# Patient Record
Sex: FEMALE | Race: WHITE | NOT HISPANIC OR LATINO | Employment: OTHER | URBAN - METROPOLITAN AREA
[De-identification: names, ages, dates, MRNs, and addresses within clinical notes are randomized per-mention and may not be internally consistent; named-entity substitution may affect disease eponyms.]

---

## 2018-08-27 NOTE — PROGRESS NOTES
[facility] at [facility]  INITIAL OFFICE CONSULT      Carli Carver  1935  PCP: Maria Antonia Rivera MD    SUBJECTIVE:   Carli Carver is a 83 y.o. female seen for a consultation visit regarding the following: HTN, VHD, Bradycardia, [employer] Pacemaker    Chief Complaint:   Chief Complaint   Patient presents with   • Pacemaker Problem          Consultation is requested by Kleber Ellison MD for evaluation of Pacemaker Problem    Problem List:   1. Severe Bradycardia/HB   A)Holter 2014 Bradycardia/CHB   B)[employer] Pacemaker 2014     2. Chest Pain   A)Echo EF normal,  mild AI 2010   b)Negative MPS 2014/Moedrate calcium score 2014-Medical therapy     2. HTN- controlled  3. HLD-Statin   4. Hypothyroidism- Chronic synthroid     History:  Today Mrs. Carver was seen for initial consultation regarding history of severe bradycardia, hypertension, hyperlipidemia, and previous [employer]ick pacemaker.  She was following with Dr. Ellison and Scot is moving to [facility] therefore she wanted to reestablish care with our office.  She reports in 2014 she was having episodes of dizziness Holter monitor confirmed severe complete heart block and bradycardia.  A dual-chamber device was placed by Dr. Ellison and followed since then.  In addition above she's had history of chronic chest pain shortness of breath.  Echocardiogram in 2010 revealed mild a abnormal LV function.  She has stress test 2014 revealing no ischemia with normal EF.  She also had A CT calcium score read as Moderate.   She has been treated with chronic medical therapy including aspirin and statin.  She denies any worsening symptoms of chest pain suggesting angina.  She denies any heart failer symptoms such as orthopnea, peripheral edema, or PND.  She states she is very active around her house does her own housework and takes walks without any symptoms.  She denies palpitations, dizziness, or near-syncope  events.      Cardiac PMH: (Old records have been reviewed and summarized below)        Past Medical History, Past Surgical History, Family history, Social History, and Medications were all reviewed with the patient today and updated as necessary.     Current Outpatient Prescriptions   Medication Sig Dispense Refill   • aspirin 81 MG EC tablet Take 81 mg by mouth Every Other Day.     • atorvastatin (LIPITOR) 20 MG tablet Take 20 mg by mouth Daily.     • BIOTIN PO Take  by mouth Daily.     • calcium carbonate (OS-YUNIER) 600 MG tablet Take 600 mg by mouth 3 (Three) Times a Day With Meals.     • calcium carbonate (TUMS) 500 MG chewable tablet Chew 1 tablet As Needed for Indigestion or Heartburn.     • levothyroxine (SYNTHROID, LEVOTHROID) 100 MCG tablet Take 100 mcg by mouth Daily.     • Magnesium 250 MG tablet Take  by mouth Daily.     • Melatonin 10 MG capsule Take 10 mg by mouth Every Night. Takes 1-2 nightly     • Multiple Vitamins-Minerals (MULTIVITAMIN ADULTS PO) Take  by mouth.     • Omega-3 Fatty Acids (FISH OIL) 1200 MG capsule capsule Take 1,200 mg by mouth Daily.     • vitamin B-12 (CYANOCOBALAMIN) 1000 MCG tablet Take 1,000 mcg by mouth Daily.       No current facility-administered medications for this visit.      No Known Allergies      Past Medical History:   Diagnosis Date   • Bradycardia    • Heartburn    • Hyperlipidemia    • Hypothyroid      Past Surgical History:   Procedure Laterality Date   • CATARACT EXTRACTION Right    • INSERT / REPLACE / REMOVE PACEMAKER     • TUBAL ABDOMINAL LIGATION       Family History   Problem Relation Age of Onset   • Lung cancer Mother    • Heart disease Brother      Social History   Substance Use Topics   • Smoking status: Never Smoker   • Smokeless tobacco: Not on file   • Alcohol use No       ROS:  Review of Symptoms:  General: no recent weight loss/gain, weakness or fatigue  Skin: no rashes, lumps, or other skin changes  HEENT: no dizziness, lightheadedness, or vision  "changes  Respiratory: no cough or hemoptysis  Cardiovascular: no palpitations, and tachycardia  Gastrointestinal: no black/tarry stools or diarrhea  Urinary: no change in frequency or urgency  Peripheral Vascular: no claudication or leg cramps  Musculoskeletal: no muscle or joint pain/stiffness  Psychiatric: no depression or excessive stress  Neurological: no sensory or motor loss, no syncope  Hematologic: no anemia, easy bruising or bleeding  Endocrine: no thyroid problems, nor heat or cold intolerance         PHYSICAL EXAM:   /74 (BP Location: Left arm, Patient Position: Sitting)   Pulse 70   Ht 160 cm (63\")   Wt 60.7 kg (133 lb 12.8 oz)   BMI 23.70 kg/m²      Wt Readings from Last 5 Encounters:   08/28/18 60.7 kg (133 lb 12.8 oz)     BP Readings from Last 5 Encounters:   08/28/18 138/74       General-Well Nourished, Well developed  Eyes - PERRLA  Neck- supple, No mass  CV- regular rate and rhythm, no MRG  Lung- clear bilaterally  Abd- soft, +BS  Musc/skel - Norm strength and range of motion  Skin- warm and dry  Neuro - Alert & Oriented x 3, appropriate mood.    Medical problems and test results were reviewed with the patient today.     Results for orders placed or performed during the hospital encounter of 08/06/14   Hemoglobin A1c   Result Value Ref Range    Hemoglobin A1C 6.0 4.00 - 6.00 %    Mean Bld Glu Estim. 120 mg/dL   Lipid panel   Result Value Ref Range    Total Cholesterol 142 0 - 200 mg/dL    Triglycerides 182 (H) 0 - 150 mg/dL    HDL Cholesterol 52 40 - 60 mg/dL    LDL Cholesterol  79 0 - 130 mg/dL         Lab Results   Component Value Date    HDL 52 08/06/2014    LDL 79 08/06/2014     Device interrogation:   Dual-chamber [employer] pacemaker.  A paced 95%.  RV paced less than 1%.  P wave 2.6 mV.  R-wave greater than 12 mV.  Atrial threshold 0.62 V is 0.5 ms.  RV threshold 1.0 V at 0.5 ms.  Atrial impedance 390 ohms.  RV impedance 630 ohms.  Battery voltage 2.98 V.  9 years remaining on " battery.  Less than 1% mode switches.      EKG:  (EKG/Tracing has been independently visualized by me and summarized below)      ECG 12 Lead  Date/Time: 8/28/2018 2:40 PM  Performed by: HUNTER FUNG  Authorized by: HUNTER FUNG   Rhythm: paced  Rate: normal  Conduction: conduction normal  ST Segments: ST segments normal  T Waves: T waves normal  QRS axis: normal  Clinical impression: normal ECG        ASSESSMENT:   1. VHD, Mild AI, Normal EF by Echo 2010.  Consider repeat Echo if change in symptoms.   2. HTN:  Controlled, Sodium restriction, diet and  exercise.   3. HLD: Continue statin   3. Pacemaker:  Dual chamber, Normal function.     PLAN:    · Continue current medical therapy  · Return for follow up in 6 months or sooner as needed.       Cardiology/Electrophysiology  08/28/18  10:57 AM  Will Sherrie MENDOZA

## 2018-08-28 ENCOUNTER — OFFICE VISIT (OUTPATIENT)
Dept: CARDIOLOGY | Facility: CLINIC | Age: 83
End: 2018-08-28

## 2018-08-28 VITALS
HEIGHT: 63 IN | BODY MASS INDEX: 23.71 KG/M2 | WEIGHT: 133.8 LBS | HEART RATE: 70 BPM | DIASTOLIC BLOOD PRESSURE: 74 MMHG | SYSTOLIC BLOOD PRESSURE: 138 MMHG

## 2018-08-28 DIAGNOSIS — R00.1 BRADYCARDIA: Primary | ICD-10-CM

## 2018-08-28 PROCEDURE — 99203 OFFICE O/P NEW LOW 30 MIN: CPT | Performed by: PHYSICIAN ASSISTANT

## 2018-08-28 PROCEDURE — 93283 PRGRMG EVAL IMPLANTABLE DFB: CPT | Performed by: PHYSICIAN ASSISTANT

## 2018-08-28 RX ORDER — AMOXICILLIN 500 MG
1200 CAPSULE ORAL AS NEEDED
COMMUNITY
End: 2020-04-09

## 2018-08-28 RX ORDER — ATORVASTATIN CALCIUM 20 MG/1
20 TABLET, FILM COATED ORAL NIGHTLY
COMMUNITY
End: 2023-01-13 | Stop reason: SDUPTHER

## 2018-08-28 RX ORDER — PHENOL 1.4 %
600 AEROSOL, SPRAY (ML) MUCOUS MEMBRANE DAILY
Status: ON HOLD | COMMUNITY
End: 2020-07-15

## 2018-08-28 RX ORDER — ASPIRIN 81 MG/1
81 TABLET ORAL DAILY
Status: ON HOLD | COMMUNITY
End: 2020-04-23

## 2018-08-28 RX ORDER — LANOLIN ALCOHOL/MO/W.PET/CERES
1000 CREAM (GRAM) TOPICAL DAILY
COMMUNITY
End: 2019-09-24

## 2018-08-28 RX ORDER — LEVOTHYROXINE SODIUM 0.1 MG/1
100 TABLET ORAL DAILY
COMMUNITY
End: 2019-03-18

## 2018-08-28 RX ORDER — MULTIVITAMIN WITH IRON
500 TABLET ORAL EVERY OTHER DAY
COMMUNITY

## 2018-08-28 RX ORDER — MELATONIN 10 MG
10 CAPSULE ORAL NIGHTLY PRN
COMMUNITY
End: 2022-05-27

## 2018-08-28 RX ORDER — CALCIUM CARBONATE 200(500)MG
1 TABLET,CHEWABLE ORAL AS NEEDED
COMMUNITY
End: 2022-05-27

## 2018-09-17 ENCOUNTER — TELEPHONE (OUTPATIENT)
Dept: CARDIOLOGY | Facility: CLINIC | Age: 83
End: 2018-09-17

## 2018-09-17 NOTE — TELEPHONE ENCOUNTER
Called pt due to Merlin home monitor is not reading.  Left my name and number for pt to return my call.    Pt returned my call and they disconnected their home phone.  Sending cellular adapter.

## 2018-09-27 ENCOUNTER — TELEPHONE (OUTPATIENT)
Dept: CARDIOLOGY | Facility: CLINIC | Age: 83
End: 2018-09-27

## 2018-09-27 ENCOUNTER — CLINICAL SUPPORT NO REQUIREMENTS (OUTPATIENT)
Dept: CARDIOLOGY | Facility: CLINIC | Age: 83
End: 2018-09-27

## 2018-09-27 DIAGNOSIS — R00.1 BRADYCARDIA: ICD-10-CM

## 2018-09-27 PROCEDURE — 93296 REM INTERROG EVL PM/IDS: CPT | Performed by: INTERNAL MEDICINE

## 2018-09-27 PROCEDURE — 93294 REM INTERROG EVL PM/LDLS PM: CPT | Performed by: INTERNAL MEDICINE

## 2018-09-27 NOTE — TELEPHONE ENCOUNTER
Pt received her adaptor but did not send transmission. She will call me when she gets home and I will walk her through the steps.

## 2019-01-22 ENCOUNTER — CLINICAL SUPPORT NO REQUIREMENTS (OUTPATIENT)
Dept: CARDIOLOGY | Facility: CLINIC | Age: 84
End: 2019-01-22

## 2019-01-22 DIAGNOSIS — R00.1 BRADYCARDIA: Primary | ICD-10-CM

## 2019-01-22 PROCEDURE — 93294 REM INTERROG EVL PM/LDLS PM: CPT | Performed by: INTERNAL MEDICINE

## 2019-01-22 PROCEDURE — 93296 REM INTERROG EVL PM/IDS: CPT | Performed by: INTERNAL MEDICINE

## 2019-03-05 ENCOUNTER — OFFICE VISIT (OUTPATIENT)
Dept: CARDIOLOGY | Facility: CLINIC | Age: 84
End: 2019-03-05

## 2019-03-05 VITALS
DIASTOLIC BLOOD PRESSURE: 70 MMHG | HEIGHT: 62 IN | OXYGEN SATURATION: 99 % | WEIGHT: 142.4 LBS | SYSTOLIC BLOOD PRESSURE: 128 MMHG | BODY MASS INDEX: 26.2 KG/M2 | HEART RATE: 79 BPM

## 2019-03-05 DIAGNOSIS — R00.1 BRADYCARDIA: ICD-10-CM

## 2019-03-05 DIAGNOSIS — R42 DIZZINESS: Primary | ICD-10-CM

## 2019-03-05 PROCEDURE — 93280 PM DEVICE PROGR EVAL DUAL: CPT | Performed by: INTERNAL MEDICINE

## 2019-03-05 PROCEDURE — 99214 OFFICE O/P EST MOD 30 MIN: CPT | Performed by: INTERNAL MEDICINE

## 2019-03-05 RX ORDER — MECLIZINE HYDROCHLORIDE 25 MG/1
25 TABLET ORAL 3 TIMES DAILY PRN
Status: DISCONTINUED | OUTPATIENT
Start: 2019-03-05 | End: 2019-03-18

## 2019-03-05 NOTE — PROGRESS NOTES
Carli Carver  1935  PCP: Maria Antonia Rivera MD    SUBJECTIVE:   Carli Carver is a 84 y.o. female seen for a follow up visit regarding the following:     Chief Complaint: Follow up for bradycardia    HPI:    Since last visit the patient's status has been stable, occasional weakness. Vertigo issues     History:       Cardiac PMH: (Old records have been reviewed and summarized below)  1. Severe Bradycardia              A)Holter 2014 Bradycardia              B)[employer] Pacemaker 2014                2. Chest Pain              A)Echo EF normal,  mild AI 2010              b)Negative MPS 2014/Moedrate calcium score 2014-Medical therapy      2. HTN- controlled  3. HLD-Statin   4. Hypothyroidism- Chronic synthroid         Current Outpatient Medications:   •  aspirin 81 MG EC tablet, Take 81 mg by mouth Every Other Day., Disp: , Rfl:   •  atorvastatin (LIPITOR) 20 MG tablet, Take 20 mg by mouth Daily., Disp: , Rfl:   •  BIOTIN PO, Take  by mouth Daily., Disp: , Rfl:   •  calcium carbonate (OS-YUNIER) 600 MG tablet, Take 600 mg by mouth 3 (Three) Times a Day With Meals., Disp: , Rfl:   •  calcium carbonate (TUMS) 500 MG chewable tablet, Chew 1 tablet As Needed for Indigestion or Heartburn., Disp: , Rfl:   •  levothyroxine (SYNTHROID, LEVOTHROID) 100 MCG tablet, Take 100 mcg by mouth Daily., Disp: , Rfl:   •  Magnesium 250 MG tablet, Take  by mouth Daily., Disp: , Rfl:   •  Melatonin 10 MG capsule, Take 10 mg by mouth Every Night. Takes 1-2 nightly, Disp: , Rfl:   •  Multiple Vitamins-Minerals (MULTIVITAMIN ADULTS PO), Take  by mouth Daily., Disp: , Rfl:   •  Omega-3 Fatty Acids (FISH OIL) 1200 MG capsule capsule, Take 1,200 mg by mouth Daily., Disp: , Rfl:   •  vitamin B-12 (CYANOCOBALAMIN) 1000 MCG tablet, Take 1,000 mcg by mouth Daily., Disp: , Rfl:     Current Facility-Administered Medications:   •  meclizine (ANTIVERT) tablet 25 mg, 25 mg, Oral, TID PRN, Kalia Camacho MD    Past Medical History, Past  "Surgical History, Family history, Social History, and Medications were all reviewed with the patient today and updated as necessary.       There is no problem list on file for this patient.    No Known Allergies  Past Medical History:   Diagnosis Date   • Bradycardia    • Heartburn    • Hyperlipidemia    • Hypothyroid      Past Surgical History:   Procedure Laterality Date   • CATARACT EXTRACTION Right    • INSERT / REPLACE / REMOVE PACEMAKER     • TUBAL ABDOMINAL LIGATION       Family History   Problem Relation Age of Onset   • Lung cancer Mother    • Heart disease Brother      Social History     Tobacco Use   • Smoking status: Never Smoker   Substance Use Topics   • Alcohol use: No         PHYSICAL EXAM:    /70 (BP Location: Right arm, Patient Position: Sitting)   Pulse 79   Ht 157.5 cm (62\")   Wt 64.6 kg (142 lb 6.4 oz)   SpO2 99%   BMI 26.05 kg/m²        Wt Readings from Last 5 Encounters:   03/05/19 64.6 kg (142 lb 6.4 oz)   08/28/18 60.7 kg (133 lb 12.8 oz)       BP Readings from Last 5 Encounters:   03/05/19 128/70   08/28/18 138/74       General-Well Nourished, Well developed  Eyes - PERRLA  Neck- supple, No mass  CV- regular rate and rhythm, no MRG, No edema  Lung- clear bilaterally  Abd- soft, +BS  Musc/skel - Norm strength and range of motion  Skin- warm and dry  Neuro - Alert & Oriented x 3, appropriate mood.        Medical problems and test results were reviewed with the patient today.     No results found for this or any previous visit (from the past 672 hour(s)).      EKG: (EKG has been independently visualized by me and summarized below)    Procedures     ASSESSMENT and PLAN  1. Bradycardia - S/P pacemaker - Stable  2. Pacemaker - Stable function - Adjusted Rate response  3. Vertigo - Stable antivert 25mg TID PRN until she can see her PCP        Return in about 6 months (around 9/5/2019) for office visit and device check with [employer].        Kalia Camacho M.D., F.A.C.C, " KASSIE.  Cardiology/Electrophysiology  3/5/2019  10:28 AM

## 2019-03-06 RX ORDER — MECLIZINE HYDROCHLORIDE 25 MG/1
25 TABLET ORAL 3 TIMES DAILY PRN
Qty: 30 TABLET | Refills: 3 | Status: SHIPPED | OUTPATIENT
Start: 2019-03-06 | End: 2019-09-24

## 2019-03-17 NOTE — PROGRESS NOTES
[facility] at [facility]   OFFICE NOTE      Carli Carver  1935  PCP: Maria Antonia Rivera MD    SUBJECTIVE:   Carli Carver is a 84 y.o. female seen for a follow up visit regarding the following:     CC: Bradycardia      HPI:   The patient is a pleasant 84-year-old female presents today follow-up regarding history of severe bradycardia, atypical chest pain, hypertension, dyslipidemia and [employer] pacemaker.  Ms. Carver states that since last visit with our office she has been doing well.  She denies any chest pain or chest tightness with exertion suggesting angina pectoris.  She states her blood pressure is always controlled.  She is taking her statin drug without side effects and she checks her lipids with her primary care provider.  She denies any palpitations, near-syncope or syncope.  She does get tired randomly throughout the day.  She has had some muscle tremors in her hands that she is concerned about and would like to discuss further with her PCP.  She also had some lab work data suggesting mildly elevated tyroid function therefore her dose of Synthroid was adjusted.  Overall she feels she is doing well.      ROS:  Review of Symptoms:  General: + Weakness  Skin: no rashes, lumps, or other skin changes  HEENT: no dizziness, lightheadedness, or vision changes  Respiratory: no cough or hemoptysis  Cardiovascular: no palpitations, and tachycardia  Gastrointestinal: no black/tarry stools or diarrhea  Urinary: no change in frequency or urgency  Peripheral Vascular: no claudication or leg cramps  Musculoskeletal: no muscle or joint pain/stiffness  Psychiatric: no depression or excessive stress  Neurological: no sensory or motor loss, no syncope  Hematologic: no anemia, easy bruising or bleeding  Endocrine: no thyroid problems, nor heat or cold intolerance    Cardiac PMH: (Old records have been reviewed and summarized below)  1. Severe Bradycardia              A)Holter 2014  Bradycardia              B)[employer] Pacemaker 2014     2. Chest Pain              A)Echo EF normal,  mild AI 2010              b)Negative MPS 2014/Moedrate calcium score 2014-Medical therapy      2. HTN- controlled  3. HLD-Statin   4. Hypothyroidism- Chronic synthroid            Past Medical History, Past Surgical History, Family history, Social History, and Medications were all reviewed with the patient today and updated as necessary.       Current Outpatient Medications:   •  aspirin 81 MG EC tablet, Take 81 mg by mouth Every Other Day., Disp: , Rfl:   •  atorvastatin (LIPITOR) 20 MG tablet, Take 20 mg by mouth Daily., Disp: , Rfl:   •  BIOTIN PO, Take  by mouth Daily., Disp: , Rfl:   •  calcium carbonate (OS-YUNIER) 600 MG tablet, Take 600 mg by mouth 3 (Three) Times a Day With Meals., Disp: , Rfl:   •  calcium carbonate (TUMS) 500 MG chewable tablet, Chew 1 tablet As Needed for Indigestion or Heartburn., Disp: , Rfl:   •  levothyroxine (SYNTHROID, LEVOTHROID) 88 MCG tablet, Take 88 mcg by mouth Daily., Disp: , Rfl:   •  Magnesium 250 MG tablet, Take  by mouth Daily., Disp: , Rfl:   •  meclizine (ANTIVERT) 25 MG tablet, Take 1 tablet by mouth 3 (Three) Times a Day As Needed for dizziness., Disp: 30 tablet, Rfl: 3  •  Melatonin 10 MG capsule, Take 10 mg by mouth Every Night. Takes 1-2 nightly, Disp: , Rfl:   •  Multiple Vitamins-Minerals (MULTIVITAMIN ADULTS PO), Take  by mouth Daily., Disp: , Rfl:   •  Omega-3 Fatty Acids (FISH OIL) 1200 MG capsule capsule, Take 1,200 mg by mouth Daily., Disp: , Rfl:   •  vitamin B-12 (CYANOCOBALAMIN) 1000 MCG tablet, Take 1,000 mcg by mouth Daily., Disp: , Rfl:   No current facility-administered medications for this visit.       No Known Allergies  Patient Active Problem List   Diagnosis   • Bradycardia     Past Medical History:   Diagnosis Date   • Bradycardia    • Heartburn    • Hyperlipidemia    • Hypothyroid      Past Surgical History:   Procedure Laterality Date   • CATARACT  "EXTRACTION Right    • INSERT / REPLACE / REMOVE PACEMAKER     • TUBAL ABDOMINAL LIGATION       Family History   Problem Relation Age of Onset   • Lung cancer Mother    • Heart disease Brother      Social History     Tobacco Use   • Smoking status: Never Smoker   • Smokeless tobacco: Never Used   Substance Use Topics   • Alcohol use: No         PHYSICAL EXAM:    /72 (BP Location: Left arm, Patient Position: Sitting)   Pulse 70   Ht 157.5 cm (62\")   Wt 63.5 kg (140 lb)   BMI 25.61 kg/m²        Wt Readings from Last 5 Encounters:   03/18/19 63.5 kg (140 lb)   03/05/19 64.6 kg (142 lb 6.4 oz)   08/28/18 60.7 kg (133 lb 12.8 oz)       BP Readings from Last 5 Encounters:   03/18/19 128/72   03/05/19 128/70   08/28/18 138/74       General appearance - Alert, well appearing, and in no distress   Mental status - Affect appropriate to mood.  Eyes - Sclerae anicteric,  ENMT - Hearing grossly normal bilaterally, Dental hygiene good.  Neck - Carotids upstroke normal bilaterally, no bruits, no JVD.  Resp - Clear to auscultation, no wheezes, rales or rhonchi, symmetric air entry.  Heart - Normal rate, regular rhythm, normal S1, S2, no murmurs, rubs, clicks or gallops.  GI - Soft, nontender, nondistended, no masses or organomegaly.  Neurological - Grossly intact - normal speech, no focal findings  Musculoskeletal - No joint tenderness, deformity or swelling, no muscular tenderness noted.  Extremities - Peripheral pulses normal, no pedal edema, no clubbing or cyanosis.  Skin - Normal coloration and turgor.  Psych -  oriented to person, place, and time.    Medical problems and test results were reviewed with the patient today.       Device Interrogation:  [employer] dual-chamber pacemaker.  DDDR is 70/100.  A paced 94%.  RV paced 4%.  P wave 2.2 mV.  R waves greater than 12.0.  Atrial threshold 0.5 V 0.5 ms.  RV threshold 0.75 V at 0.5 mill seconds.  Atrial impedance 380 ohms.  RV impedance 610 ohms.  Battery voltage 2.8.  " 8.9 years remaining.  No significant arrhythmias noted.    ASSESSMENT   1.  Bradycardia: Normal pacemaker function.  2.  HTN: Controlled, Limit sodium.  Increase exercise.   3.  HLD: Statin, follow up FLP with PCP.     PLAN  · Continue current medical therapy.  · Follow up in 6 months.       3/18/2019  9:44 AM    Will Sherrie MENDOZA

## 2019-03-18 ENCOUNTER — OFFICE VISIT (OUTPATIENT)
Dept: CARDIOLOGY | Facility: CLINIC | Age: 84
End: 2019-03-18

## 2019-03-18 VITALS
WEIGHT: 140 LBS | HEART RATE: 70 BPM | DIASTOLIC BLOOD PRESSURE: 72 MMHG | SYSTOLIC BLOOD PRESSURE: 128 MMHG | HEIGHT: 62 IN | BODY MASS INDEX: 25.76 KG/M2

## 2019-03-18 DIAGNOSIS — R00.1 BRADYCARDIA: Primary | ICD-10-CM

## 2019-03-18 PROCEDURE — 99213 OFFICE O/P EST LOW 20 MIN: CPT | Performed by: PHYSICIAN ASSISTANT

## 2019-03-18 PROCEDURE — 93280 PM DEVICE PROGR EVAL DUAL: CPT | Performed by: PHYSICIAN ASSISTANT

## 2019-03-18 RX ORDER — LEVOTHYROXINE SODIUM 88 UG/1
88 TABLET ORAL DAILY
COMMUNITY
End: 2022-08-16 | Stop reason: SDUPTHER

## 2019-05-03 ENCOUNTER — CLINICAL SUPPORT NO REQUIREMENTS (OUTPATIENT)
Dept: CARDIOLOGY | Facility: CLINIC | Age: 84
End: 2019-05-03

## 2019-05-03 DIAGNOSIS — R00.1 BRADYCARDIA: ICD-10-CM

## 2019-05-03 PROCEDURE — 93294 REM INTERROG EVL PM/LDLS PM: CPT | Performed by: INTERNAL MEDICINE

## 2019-05-03 PROCEDURE — 93296 REM INTERROG EVL PM/IDS: CPT | Performed by: INTERNAL MEDICINE

## 2019-08-08 ENCOUNTER — CLINICAL SUPPORT NO REQUIREMENTS (OUTPATIENT)
Dept: CARDIOLOGY | Facility: CLINIC | Age: 84
End: 2019-08-08

## 2019-08-08 DIAGNOSIS — R00.1 BRADYCARDIA: Primary | ICD-10-CM

## 2019-08-08 PROCEDURE — 93296 REM INTERROG EVL PM/IDS: CPT | Performed by: INTERNAL MEDICINE

## 2019-08-08 PROCEDURE — 93294 REM INTERROG EVL PM/LDLS PM: CPT | Performed by: INTERNAL MEDICINE

## 2019-09-24 ENCOUNTER — OFFICE VISIT (OUTPATIENT)
Dept: CARDIOLOGY | Facility: CLINIC | Age: 84
End: 2019-09-24

## 2019-09-24 VITALS
OXYGEN SATURATION: 99 % | WEIGHT: 140 LBS | SYSTOLIC BLOOD PRESSURE: 124 MMHG | BODY MASS INDEX: 25.76 KG/M2 | DIASTOLIC BLOOD PRESSURE: 72 MMHG | HEART RATE: 89 BPM | HEIGHT: 62 IN

## 2019-09-24 DIAGNOSIS — R00.1 BRADYCARDIA: Primary | ICD-10-CM

## 2019-09-24 PROCEDURE — 99213 OFFICE O/P EST LOW 20 MIN: CPT | Performed by: PHYSICIAN ASSISTANT

## 2019-09-24 PROCEDURE — 93280 PM DEVICE PROGR EVAL DUAL: CPT | Performed by: PHYSICIAN ASSISTANT

## 2019-09-24 NOTE — PROGRESS NOTES
Carli Carver  1935  PCP: Maria Antonia Rivera MD    SUBJECTIVE:   Carli Carver is a 84 y.o. female seen for a follow up visit regarding the following:     Chief Complaint: Follow up for bradycardia     HPI:    The patient is a pleasant 84 year-old female presents today follow-up regarding history of severe bradycardia, atypical chest pain, hypertension, dyslipidemia and [employer] pacemaker. She has been stable from a cardiac standpoint. She has had some stressors at home as she is now caring for her terminally ill . She reports that her HR is occasionally in the 90s at home after she has been up walking around and she was concerned. I reassured her this is a normal physiologic response. She does not have any chest pain, sob, edema, orthopnea, palps.     Cardiac PMH: (Old records have been reviewed and summarized below)  1. Severe Bradycardia              A)Holter 2014 Bradycardia              B)[employer] Pacemaker 2014     2. Chest Pain              A)Echo EF normal,  mild AI 2010              b)Negative MPS 2014/Moedrate calcium score 2014-Medical therapy      2. HTN- controlled  3. HLD-Statin   4. Hypothyroidism- Chronic synthroid       Current Outpatient Medications:   •  aspirin 81 MG EC tablet, Take 81 mg by mouth Every Other Day., Disp: , Rfl:   •  atorvastatin (LIPITOR) 20 MG tablet, Take 20 mg by mouth Daily., Disp: , Rfl:   •  BIOTIN PO, Take  by mouth Daily., Disp: , Rfl:   •  calcium carbonate (OS-YUNIER) 600 MG tablet, Take 600 mg by mouth 3 (Three) Times a Day With Meals., Disp: , Rfl:   •  calcium carbonate (TUMS) 500 MG chewable tablet, Chew 1 tablet As Needed for Indigestion or Heartburn., Disp: , Rfl:   •  levothyroxine (SYNTHROID, LEVOTHROID) 88 MCG tablet, Take 88 mcg by mouth Daily., Disp: , Rfl:   •  Magnesium 250 MG tablet, Take  by mouth Daily., Disp: , Rfl:   •  Melatonin 10 MG capsule, Take 10 mg by mouth Every Night. Takes 1-2 nightly, Disp: , Rfl:   •  Multiple  "Vitamins-Minerals (MULTIVITAMIN ADULTS PO), Take  by mouth Daily., Disp: , Rfl:   •  Omega-3 Fatty Acids (FISH OIL) 1200 MG capsule capsule, Take 1,200 mg by mouth As Needed., Disp: , Rfl:     Past Medical History, Past Surgical History, Family history, Social History, and Medications were all reviewed with the patient today and updated as necessary.       Patient Active Problem List   Diagnosis   • Bradycardia     No Known Allergies  Past Medical History:   Diagnosis Date   • Bradycardia    • Heartburn    • Hyperlipidemia    • Hypothyroid      Past Surgical History:   Procedure Laterality Date   • CATARACT EXTRACTION Right    • INSERT / REPLACE / REMOVE PACEMAKER     • TUBAL ABDOMINAL LIGATION       Family History   Problem Relation Age of Onset   • Lung cancer Mother    • Heart disease Brother      Social History     Tobacco Use   • Smoking status: Never Smoker   • Smokeless tobacco: Never Used   Substance Use Topics   • Alcohol use: No         PHYSICAL EXAM:    /72 (BP Location: Right arm, Patient Position: Sitting)   Pulse 89   Ht 157.5 cm (62\")   Wt 63.5 kg (140 lb)   SpO2 99%   BMI 25.61 kg/m²        Wt Readings from Last 5 Encounters:   09/24/19 63.5 kg (140 lb)   03/18/19 63.5 kg (140 lb)   03/05/19 64.6 kg (142 lb 6.4 oz)   08/28/18 60.7 kg (133 lb 12.8 oz)       BP Readings from Last 5 Encounters:   09/24/19 124/72   03/18/19 128/72   03/05/19 128/70   08/28/18 138/74       General-Well Nourished, Well developed  Eyes - PERRLA  Neck- supple, No mass  CV- regular rate and rhythm, no MRG, No edema  Lung- clear bilaterally  Abd- soft, +BS  Musc/skel - Norm strength and range of motion  Skin- warm and dry  Neuro - Alert & Oriented x 3, appropriate mood.        Medical problems and test results were reviewed with the patient today.     No results found for this or any previous visit (from the past 672 hour(s)).      EKG: (EKG has been independently visualized by me and summarized below)    Procedures "     ASSESSMENT and PLAN    1.  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 Normal function today. A pacing 92%.  2.  HTN: Controlled off meds. Limit sodium.  Increase exercise.   3.  Atrial Flutter: longest episode 2 seconds. Will continue to monitor. If burden increases will start NOAC.       Return in about 6 months (around 3/24/2020) for SJM.        Kristine Mcdonough PA-C   Cardiology/Electrophysiology  9/24/2019  1:50 PM

## 2019-11-07 ENCOUNTER — CLINICAL SUPPORT NO REQUIREMENTS (OUTPATIENT)
Dept: CARDIOLOGY | Facility: CLINIC | Age: 84
End: 2019-11-07

## 2019-11-07 ENCOUNTER — TELEPHONE (OUTPATIENT)
Dept: CARDIOLOGY | Facility: CLINIC | Age: 84
End: 2019-11-07

## 2019-11-07 DIAGNOSIS — R00.1 BRADYCARDIA: Primary | ICD-10-CM

## 2019-11-07 PROCEDURE — 93294 REM INTERROG EVL PM/LDLS PM: CPT | Performed by: INTERNAL MEDICINE

## 2019-11-07 PROCEDURE — 93296 REM INTERROG EVL PM/IDS: CPT | Performed by: INTERNAL MEDICINE

## 2019-11-07 NOTE — TELEPHONE ENCOUNTER
Called pt due to [employer] home monitor didn't send in scheduled reading.  Worked with pt and seemed like it was going to transmit.  Still not transmitting.  Called and left pt message for pt to unplug monitor and plug back in and to call me back and we would try sending in reading again.  Left message for pt to return my call.    Pt called back and still unable to transmit.  Going to send new cell adapter and try it.  Monitor just connected on 11/2/19.

## 2020-01-01 ENCOUNTER — CLINICAL SUPPORT NO REQUIREMENTS (OUTPATIENT)
Dept: CARDIOLOGY | Facility: CLINIC | Age: 85
End: 2020-01-01

## 2020-01-01 DIAGNOSIS — I48.91 ATRIAL FIBRILLATION, UNSPECIFIED TYPE (HCC): Primary | ICD-10-CM

## 2020-01-03 ENCOUNTER — TELEPHONE (OUTPATIENT)
Dept: CARDIOLOGY | Facility: CLINIC | Age: 85
End: 2020-01-03

## 2020-01-03 NOTE — TELEPHONE ENCOUNTER
Called Mrs Carver to let her know Dr Camacho wants to monitor her atrial fibrillation until her appointment in March. She ask if it was okay for her to exercise and I told her to do what she wanted to do as long as she felt good. I told her to call back if she had any symptoms and she verbalized understanding.I had her send another transmission this morning and her atrial fib burden has increased from 2.9 on 1/1/2020 to 7.5.

## 2020-01-07 ENCOUNTER — TELEPHONE (OUTPATIENT)
Dept: CARDIOLOGY | Facility: CLINIC | Age: 85
End: 2020-01-07

## 2020-01-07 NOTE — TELEPHONE ENCOUNTER
Mrs Carver called back today with many concerns. She is experiencing constipation, head feeling strange,(pt cannot explain feeling strange) feeling over whelmed,very nervous,sob and fatigue. She states her family is concerned and wants her to been seen sooner than her appointment in March. I explained to the her the symptoms she was telling me did not sound heart related. I advised her to see her primary care. Mrs Carver stated she would call her primary care but she was not satisfied until I told her I would speak with you.

## 2020-01-09 NOTE — TELEPHONE ENCOUNTER
"I called Mrs Carver to let her know you agreed she should see her PCP. While talking with Mrs Carver her daughter walked in and ask to speak with me. Her daughter (Carlotta) is \"very concerned\" about her mom. Carlotta states she and her sister have notice how SOB Mrs Carver gets just walking to the bathroom. Mrs Carver has been \" a harder worker\" her whole life and has never been sedentary. Carlotta is making her mom an appointment with her PCP but she is adamant her mom needs to been seen and checked for heart blockage.On 1/5/2020 her atrial fib burden was 5.8 and on 1/8/2020 it was 8.1.Would you like me to get her an appointment with Will?  "

## 2020-03-19 ENCOUNTER — CLINICAL SUPPORT NO REQUIREMENTS (OUTPATIENT)
Dept: CARDIOLOGY | Facility: CLINIC | Age: 85
End: 2020-03-19

## 2020-03-19 DIAGNOSIS — R00.1 BRADYCARDIA: ICD-10-CM

## 2020-03-19 PROCEDURE — 93294 REM INTERROG EVL PM/LDLS PM: CPT | Performed by: INTERNAL MEDICINE

## 2020-03-19 PROCEDURE — 93296 REM INTERROG EVL PM/IDS: CPT | Performed by: INTERNAL MEDICINE

## 2020-03-31 ENCOUNTER — TELEPHONE (OUTPATIENT)
Dept: CARDIOLOGY | Facility: CLINIC | Age: 85
End: 2020-03-31

## 2020-03-31 NOTE — TELEPHONE ENCOUNTER
Pt called stating she went for a walk with her daughter yesterday and got so SOB she had to go home. I had her send in a transmission and it showed a increase of heart rate while she was walking. The presenting EGM from this morning showed atrial flutter. I advised Mrs Carver to do activities as she felt she could and try not to get so upset. I explained that when she exercises it is normal for her heart rate to go up. She verbalized understanding and stated she would try to relax.

## 2020-04-02 ENCOUNTER — CLINICAL SUPPORT NO REQUIREMENTS (OUTPATIENT)
Dept: CARDIOLOGY | Facility: CLINIC | Age: 85
End: 2020-04-02

## 2020-04-02 DIAGNOSIS — I48.0 PAROXYSMAL ATRIAL FIBRILLATION (HCC): Primary | ICD-10-CM

## 2020-04-02 DIAGNOSIS — R00.1 BRADYCARDIA: ICD-10-CM

## 2020-04-07 ENCOUNTER — TELEPHONE (OUTPATIENT)
Dept: CARDIOLOGY | Facility: CLINIC | Age: 85
End: 2020-04-07

## 2020-04-07 NOTE — TELEPHONE ENCOUNTER
Called to check in with pt regarding aflutter with fast rates at times.  No answer.  Left message for pt to return my call.

## 2020-04-08 NOTE — TELEPHONE ENCOUNTER
Pt returned my call today and she reports feeling tired for several days and out of breath.  She is having trouble sleeping at night.  She is not on anticoagulation.  Looks like she has been out of rhythm for 6 days. Trends are attached.

## 2020-04-09 ENCOUNTER — TELEPHONE (OUTPATIENT)
Dept: CARDIOLOGY | Facility: CLINIC | Age: 85
End: 2020-04-09

## 2020-04-09 ENCOUNTER — OFFICE VISIT (OUTPATIENT)
Dept: CARDIOLOGY | Facility: CLINIC | Age: 85
End: 2020-04-09

## 2020-04-09 VITALS — WEIGHT: 137.8 LBS | HEIGHT: 62 IN | BODY MASS INDEX: 25.36 KG/M2

## 2020-04-09 DIAGNOSIS — I48.0 PAROXYSMAL ATRIAL FIBRILLATION (HCC): ICD-10-CM

## 2020-04-09 DIAGNOSIS — R00.1 BRADYCARDIA: Primary | ICD-10-CM

## 2020-04-09 PROCEDURE — G2012 BRIEF CHECK IN BY MD/QHP: HCPCS | Performed by: INTERNAL MEDICINE

## 2020-04-09 NOTE — PROGRESS NOTES
Carli Carver  1935  PCP: Maria Antonia Rivera MD    SUBJECTIVE:   Carli Carver is a 85 y.o. female seen for a follow up visit regarding the following:     Chief Complaint: Follow up for bradycardia     HPI:    The patient has been feeling weak and poor. Noted to have increased A. Fib burden starting in April.     Cardiac PMH: (Old records have been reviewed and summarized below)  1. Severe Bradycardia              A)Holter 2014 Bradycardia              B)[facility] Pacemaker 2014     2. Chest Pain              A)Echo EF normal,  mild AI 2010              b)Negative MPS 2014/Moderate calcium score 2014-Medical therapy      2. HTN- controlled  3. HLD-Statin   4. Hypothyroidism- Chronic synthroid       Current Outpatient Medications:   •  aspirin 81 MG EC tablet, Take 81 mg by mouth Daily., Disp: , Rfl:   •  atorvastatin (LIPITOR) 20 MG tablet, Take 20 mg by mouth Daily., Disp: , Rfl:   •  BIOTIN PO, Take 1 tablet by mouth Daily., Disp: , Rfl:   •  calcium carbonate (OS-YUNIER) 600 MG tablet, Take 600 mg by mouth 3 (Three) Times a Day With Meals., Disp: , Rfl:   •  calcium carbonate (TUMS) 500 MG chewable tablet, Chew 1 tablet As Needed for Indigestion or Heartburn., Disp: , Rfl:   •  levothyroxine (SYNTHROID, LEVOTHROID) 88 MCG tablet, Take 88 mcg by mouth Daily., Disp: , Rfl:   •  Magnesium 250 MG tablet, Take  by mouth Daily., Disp: , Rfl:   •  Melatonin 10 MG capsule, Take 10 mg by mouth Every Night. Takes 1-2 nightly, Disp: , Rfl:   •  Unable to find, 1 each Daily. Med Name: Natto-irma (circulatory and immune system) 1 tab daily, Disp: , Rfl:   •  apixaban (Eliquis) 5 MG tablet tablet, Take 1 tablet by mouth 2 (Two) Times a Day., Disp: 60 tablet, Rfl: 11  •  metoprolol tartrate (LOPRESSOR) 25 MG tablet, Take 1 tablet by mouth 2 (Two) Times a Day., Disp: 180 tablet, Rfl: 3    Past Medical History, Past Surgical History, Family history, Social History, and Medications were all reviewed with the patient  "today and updated as necessary.       Patient Active Problem List   Diagnosis   • Bradycardia   • Paroxysmal atrial fibrillation (CMS/HCC)     No Known Allergies  Past Medical History:   Diagnosis Date   • Bradycardia    • Heartburn    • Hyperlipidemia    • Hypothyroid      Past Surgical History:   Procedure Laterality Date   • CATARACT EXTRACTION Right    • INSERT / REPLACE / REMOVE PACEMAKER     • TUBAL ABDOMINAL LIGATION       Family History   Problem Relation Age of Onset   • Lung cancer Mother    • Heart disease Brother      Social History     Tobacco Use   • Smoking status: Never Smoker   • Smokeless tobacco: Never Used   Substance Use Topics   • Alcohol use: No         PHYSICAL EXAM:    Ht 157.5 cm (62\")   Wt 62.5 kg (137 lb 12.8 oz)   BMI 25.20 kg/m²        Wt Readings from Last 5 Encounters:   04/09/20 62.5 kg (137 lb 12.8 oz)   09/24/19 63.5 kg (140 lb)   03/18/19 63.5 kg (140 lb)   03/05/19 64.6 kg (142 lb 6.4 oz)   08/28/18 60.7 kg (133 lb 12.8 oz)       BP Readings from Last 5 Encounters:   09/24/19 124/72   03/18/19 128/72   03/05/19 128/70   08/28/18 138/74       Neuro - Alert & Oriented x 3, appropriate mood.        Medical problems and test results were reviewed with the patient today.     No results found for this or any previous visit (from the past 672 hour(s)).      EKG: (EKG has been independently visualized by me and summarized below)    Procedures     ASSESSMENT and PLAN    1.  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 Normal function today. A pacing 92%.  2.  HTN: Controlled off meds. Limit sodium.  Increase exercise.   3.  Atrial Fib/Flutter: Increased burden in April 2020 - Will add metoprolol 25mg BID and Eliquis 5mg BID. Check ECHO when virus restrictions have cleared.       This patient has consented to a telehealth visit via phone. The visit was scheduled as a follow up visit to comply with patient safety concerns in accordance with CDC recommendations.  All vitals recorded within this visit " are reported by the patient.  I spent  22 minutes in total including but not limited to the 10:33 minutes spent in direct conversation with this patient.       Return in about 6 weeks (around 5/21/2020).      Kalia Camacho M.D., FRUFINA, F.H.R.S.  Cardiology/Electrophysiology  4/9/2020  10:14

## 2020-04-09 NOTE — TELEPHONE ENCOUNTER
PT calling to let us know that the Eliquis she ws prescribed is 470 dollars a month and she does not have any prescription coverage with insurance. I let her know the only cheap blood thinner is Coumadin and explained that it is a high maintenance medication. She wants to think this over and call us back.

## 2020-04-10 ENCOUNTER — CLINICAL SUPPORT NO REQUIREMENTS (OUTPATIENT)
Dept: CARDIOLOGY | Facility: CLINIC | Age: 85
End: 2020-04-10

## 2020-04-10 DIAGNOSIS — I48.0 PAROXYSMAL ATRIAL FIBRILLATION (HCC): ICD-10-CM

## 2020-04-10 DIAGNOSIS — R00.1 BRADYCARDIA: Primary | ICD-10-CM

## 2020-04-14 RX ORDER — AMIODARONE HYDROCHLORIDE 200 MG/1
200 TABLET ORAL DAILY
Qty: 30 TABLET | Refills: 5 | Status: SHIPPED | OUTPATIENT
Start: 2020-04-14 | End: 2020-05-20

## 2020-04-14 NOTE — PROGRESS NOTES
Called in Amiodarone to help stabilize the patient until she can be admitted for sotalol loading.  She will continue Eliquis but will contact her insurance company about which one is preferred.  She does not want to start on Coumadin.

## 2020-04-15 ENCOUNTER — TELEPHONE (OUTPATIENT)
Dept: CARDIOLOGY | Facility: CLINIC | Age: 85
End: 2020-04-15

## 2020-04-15 NOTE — TELEPHONE ENCOUNTER
Daughter called with concerns of her mother and feels she hasn't been reporting all her symptoms.  She said her mother has been experiencing heaviness in her Left arm and funny feeling in her shoulder blade areas.  Her mother refuses to go to the ER.  Daughter is wanting to know if she needs further testing.  Her name is Carlotta # [id number].

## 2020-04-20 ENCOUNTER — TELEPHONE (OUTPATIENT)
Dept: CARDIOLOGY | Facility: CLINIC | Age: 85
End: 2020-04-20

## 2020-04-20 NOTE — TELEPHONE ENCOUNTER
Pt's daughter called this am stating her mom is a little better but they are still not satisfied with complete results.I advised her to call Dr Camacho. I will call Mrs Carver and have her send a transmission.

## 2020-04-20 NOTE — TELEPHONE ENCOUNTER
Pt still in atrial fib/flutter with presenting rate 110-120 with 12% mode switch. Heart rate histograms attached.

## 2020-04-22 ENCOUNTER — TELEPHONE (OUTPATIENT)
Dept: CARDIOLOGY | Facility: CLINIC | Age: 85
End: 2020-04-22

## 2020-04-22 ENCOUNTER — PREP FOR SURGERY (OUTPATIENT)
Dept: OTHER | Facility: HOSPITAL | Age: 85
End: 2020-04-22

## 2020-04-22 DIAGNOSIS — I48.0 PAF (PAROXYSMAL ATRIAL FIBRILLATION) (HCC): Primary | ICD-10-CM

## 2020-04-22 DIAGNOSIS — I48.0 PAROXYSMAL ATRIAL FIBRILLATION (HCC): Primary | ICD-10-CM

## 2020-04-22 RX ORDER — ACETAMINOPHEN 325 MG/1
650 TABLET ORAL EVERY 4 HOURS PRN
Status: CANCELLED | OUTPATIENT
Start: 2020-04-22

## 2020-04-22 RX ORDER — SODIUM CHLORIDE 0.9 % (FLUSH) 0.9 %
3 SYRINGE (ML) INJECTION EVERY 12 HOURS SCHEDULED
Status: CANCELLED | OUTPATIENT
Start: 2020-04-22

## 2020-04-22 RX ORDER — ONDANSETRON 2 MG/ML
4 INJECTION INTRAMUSCULAR; INTRAVENOUS EVERY 6 HOURS PRN
Status: CANCELLED | OUTPATIENT
Start: 2020-04-22

## 2020-04-22 RX ORDER — SODIUM CHLORIDE 0.9 % (FLUSH) 0.9 %
10 SYRINGE (ML) INJECTION AS NEEDED
Status: CANCELLED | OUTPATIENT
Start: 2020-04-22

## 2020-04-22 NOTE — TELEPHONE ENCOUNTER
----- Message from Kalia Camacho MD sent at 4/22/2020 12:18 PM EDT -----  Can you schedule Ms. Carver for a CV and ECHO tomorrow. Daughter reports that she is SOB and not doing well.     Thank you  GIOVANY

## 2020-04-23 ENCOUNTER — APPOINTMENT (OUTPATIENT)
Dept: CARDIOLOGY | Facility: HOSPITAL | Age: 85
End: 2020-04-23

## 2020-04-23 ENCOUNTER — HOSPITAL ENCOUNTER (OUTPATIENT)
Dept: CARDIOLOGY | Facility: HOSPITAL | Age: 85
Discharge: HOME | End: 2020-04-23
Attending: INTERNAL MEDICINE | Admitting: INTERNAL MEDICINE

## 2020-04-23 VITALS
SYSTOLIC BLOOD PRESSURE: 105 MMHG | DIASTOLIC BLOOD PRESSURE: 71 MMHG | RESPIRATION RATE: 16 BRPM | WEIGHT: 137 LBS | OXYGEN SATURATION: 99 % | HEIGHT: 62 IN | HEART RATE: 71 BPM | BODY MASS INDEX: 25.21 KG/M2

## 2020-04-23 DIAGNOSIS — I48.0 PAROXYSMAL ATRIAL FIBRILLATION (HCC): ICD-10-CM

## 2020-04-23 LAB
ALBUMIN SERPL-MCNC: 4.8 G/DL (ref 3.5–5.2)
ALBUMIN/GLOB SERPL: 1.5 G/DL
ALP SERPL-CCNC: 90 U/L (ref 39–117)
ALT SERPL W P-5'-P-CCNC: 15 U/L (ref 1–33)
ANION GAP SERPL CALCULATED.3IONS-SCNC: 12 MMOL/L (ref 5–15)
AST SERPL-CCNC: 22 U/L (ref 1–32)
BASOPHILS # BLD AUTO: 0.02 10*3/MM3 (ref 0–0.2)
BASOPHILS NFR BLD AUTO: 0.3 % (ref 0–1.5)
BH CV ECHO MEAS - AI DEC SLOPE: 264.8 CM/SEC^2
BH CV ECHO MEAS - AI MAX PG: 74.3 MMHG
BH CV ECHO MEAS - AI MAX VEL: 430.8 CM/SEC
BH CV ECHO MEAS - AI P1/2T: 476.6 MSEC
BH CV ECHO MEAS - AO ROOT AREA (BSA CORRECTED): 2.1
BH CV ECHO MEAS - AO ROOT AREA: 8.9 CM^2
BH CV ECHO MEAS - AO ROOT DIAM: 3.4 CM
BH CV ECHO MEAS - BSA(HAYCOCK): 1.7 M^2
BH CV ECHO MEAS - BSA: 1.6 M^2
BH CV ECHO MEAS - BZI_BMI: 25.1 KILOGRAMS/M^2
BH CV ECHO MEAS - BZI_METRIC_HEIGHT: 157.5 CM
BH CV ECHO MEAS - BZI_METRIC_WEIGHT: 62.1 KG
BH CV ECHO MEAS - EDV(CUBED): 80.6 ML
BH CV ECHO MEAS - EDV(MOD-SP2): 59 ML
BH CV ECHO MEAS - EDV(MOD-SP4): 75 ML
BH CV ECHO MEAS - EDV(TEICH): 84 ML
BH CV ECHO MEAS - EF(CUBED): 65.5 %
BH CV ECHO MEAS - EF(MOD-BP): 60 %
BH CV ECHO MEAS - EF(MOD-SP2): 55.9 %
BH CV ECHO MEAS - EF(MOD-SP4): 60 %
BH CV ECHO MEAS - EF(TEICH): 57.3 %
BH CV ECHO MEAS - ESV(CUBED): 27.9 ML
BH CV ECHO MEAS - ESV(MOD-SP2): 26 ML
BH CV ECHO MEAS - ESV(MOD-SP4): 30 ML
BH CV ECHO MEAS - ESV(TEICH): 35.9 ML
BH CV ECHO MEAS - FS: 29.8 %
BH CV ECHO MEAS - IVS/LVPW: 0.99
BH CV ECHO MEAS - IVSD: 1.1 CM
BH CV ECHO MEAS - LA DIMENSION: 4.3 CM
BH CV ECHO MEAS - LA/AO: 1.3
BH CV ECHO MEAS - LAD MAJOR: 6.6 CM
BH CV ECHO MEAS - LAT PEAK E' VEL: 10.6 CM/SEC
BH CV ECHO MEAS - LATERAL E/E' RATIO: 9.5
BH CV ECHO MEAS - LV DIASTOLIC VOL/BSA (35-75): 46.1 ML/M^2
BH CV ECHO MEAS - LV IVRT: 0.09 SEC
BH CV ECHO MEAS - LV MASS(C)D: 173.5 GRAMS
BH CV ECHO MEAS - LV MASS(C)DI: 106.6 GRAMS/M^2
BH CV ECHO MEAS - LV MAX PG: 1.5 MMHG
BH CV ECHO MEAS - LV MEAN PG: 0.73 MMHG
BH CV ECHO MEAS - LV SYSTOLIC VOL/BSA (12-30): 18.4 ML/M^2
BH CV ECHO MEAS - LV V1 MAX: 60.7 CM/SEC
BH CV ECHO MEAS - LV V1 MEAN: 39.8 CM/SEC
BH CV ECHO MEAS - LV V1 VTI: 13.3 CM
BH CV ECHO MEAS - LVIDD: 4.3 CM
BH CV ECHO MEAS - LVIDS: 3 CM
BH CV ECHO MEAS - LVLD AP2: 5.5 CM
BH CV ECHO MEAS - LVLD AP4: 6.8 CM
BH CV ECHO MEAS - LVLS AP2: 4.2 CM
BH CV ECHO MEAS - LVLS AP4: 5.3 CM
BH CV ECHO MEAS - LVOT AREA (M): 3.1 CM^2
BH CV ECHO MEAS - LVOT AREA: 3.2 CM^2
BH CV ECHO MEAS - LVOT DIAM: 2 CM
BH CV ECHO MEAS - LVPWD: 1.1 CM
BH CV ECHO MEAS - MED PEAK E' VEL: 7.2 CM/SEC
BH CV ECHO MEAS - MEDIAL E/E' RATIO: 13.9
BH CV ECHO MEAS - MPA AREA: 7.3 CM^2
BH CV ECHO MEAS - MPA DIAM: 3.1 CM
BH CV ECHO MEAS - MV A MAX VEL: 27.1 CM/SEC
BH CV ECHO MEAS - MV DEC TIME: 0.2 SEC
BH CV ECHO MEAS - MV E MAX VEL: 102.7 CM/SEC
BH CV ECHO MEAS - MV E/A: 3.8
BH CV ECHO MEAS - PA ACC SLOPE: 677.4 CM/SEC^2
BH CV ECHO MEAS - PA ACC TIME: 0.1 SEC
BH CV ECHO MEAS - PA PR(ACCEL): 34.6 MMHG
BH CV ECHO MEAS - PI END-D VEL: 104.7 CM/SEC
BH CV ECHO MEAS - PULM A REVS VEL: 28.6 CM/SEC
BH CV ECHO MEAS - PULM DIAS VEL: 43.4 CM/SEC
BH CV ECHO MEAS - PULM S/D: 0.82
BH CV ECHO MEAS - PULM SYS VEL: 35.5 CM/SEC
BH CV ECHO MEAS - RAP SYSTOLE: 3 MMHG
BH CV ECHO MEAS - RVSP: 36 MMHG
BH CV ECHO MEAS - SI(CUBED): 32.4 ML/M^2
BH CV ECHO MEAS - SI(LVOT): 26.3 ML/M^2
BH CV ECHO MEAS - SI(MOD-SP2): 20.3 ML/M^2
BH CV ECHO MEAS - SI(MOD-SP4): 27.6 ML/M^2
BH CV ECHO MEAS - SI(TEICH): 29.5 ML/M^2
BH CV ECHO MEAS - SV(CUBED): 52.8 ML
BH CV ECHO MEAS - SV(LVOT): 42.8 ML
BH CV ECHO MEAS - SV(MOD-SP2): 33 ML
BH CV ECHO MEAS - SV(MOD-SP4): 45 ML
BH CV ECHO MEAS - SV(TEICH): 48.1 ML
BH CV ECHO MEAS - TAPSE (>1.6): 2 CM2
BH CV ECHO MEAS - TR MAX PG: 33 MMHG
BH CV ECHO MEAS - TR MAX VEL: 288 CM/SEC
BH CV ECHO MEASUREMENTS AVERAGE E/E' RATIO: 11.54
BH CV VAS BP LEFT ARM: NORMAL MMHG
BH CV XLRA - RV BASE: 4.98 CM
BH CV XLRA - RV LENGTH: 6.3 CM
BH CV XLRA - RV MID: 3.5 CM
BH CV XLRA - TDI S': 11.5 CM/SEC
BILIRUB SERPL-MCNC: 1.6 MG/DL (ref 0.2–1.2)
BUN BLD-MCNC: 14 MG/DL (ref 8–23)
BUN/CREAT SERPL: 16.1 (ref 7–25)
CALCIUM SPEC-SCNC: 9.9 MG/DL (ref 8.6–10.5)
CHLORIDE SERPL-SCNC: 105 MMOL/L (ref 98–107)
CO2 SERPL-SCNC: 25 MMOL/L (ref 22–29)
CREAT BLD-MCNC: 0.87 MG/DL (ref 0.57–1)
DEPRECATED RDW RBC AUTO: 43.2 FL (ref 37–54)
EOSINOPHIL # BLD AUTO: 0.09 10*3/MM3 (ref 0–0.4)
EOSINOPHIL NFR BLD AUTO: 1.5 % (ref 0.3–6.2)
ERYTHROCYTE [DISTWIDTH] IN BLOOD BY AUTOMATED COUNT: 12.3 % (ref 12.3–15.4)
GFR SERPL CREATININE-BSD FRML MDRD: 62 ML/MIN/1.73
GLOBULIN UR ELPH-MCNC: 3.1 GM/DL
GLUCOSE BLD-MCNC: 122 MG/DL (ref 65–99)
HCT VFR BLD AUTO: 46.9 % (ref 34–46.6)
HGB BLD-MCNC: 16 G/DL (ref 12–15.9)
IMM GRANULOCYTES # BLD AUTO: 0.01 10*3/MM3 (ref 0–0.05)
IMM GRANULOCYTES NFR BLD AUTO: 0.2 % (ref 0–0.5)
LEFT ATRIUM VOLUME INDEX: 48.5 ML/M^2
LEFT ATRIUM VOLUME: 79 ML
LV EF 2D ECHO EST: 60 %
LYMPHOCYTES # BLD AUTO: 2.14 10*3/MM3 (ref 0.7–3.1)
LYMPHOCYTES NFR BLD AUTO: 35.3 % (ref 19.6–45.3)
MAGNESIUM SERPL-MCNC: 2.4 MG/DL (ref 1.6–2.4)
MCH RBC QN AUTO: 32.8 PG (ref 26.6–33)
MCHC RBC AUTO-ENTMCNC: 34.1 G/DL (ref 31.5–35.7)
MCV RBC AUTO: 96.1 FL (ref 79–97)
MONOCYTES # BLD AUTO: 0.47 10*3/MM3 (ref 0.1–0.9)
MONOCYTES NFR BLD AUTO: 7.7 % (ref 5–12)
NEUTROPHILS # BLD AUTO: 3.34 10*3/MM3 (ref 1.7–7)
NEUTROPHILS NFR BLD AUTO: 55 % (ref 42.7–76)
NRBC BLD AUTO-RTO: 0 /100 WBC (ref 0–0.2)
PLATELET # BLD AUTO: 219 10*3/MM3 (ref 140–450)
PMV BLD AUTO: 9.6 FL (ref 6–12)
POTASSIUM BLD-SCNC: 4.4 MMOL/L (ref 3.5–5.2)
PROT SERPL-MCNC: 7.9 G/DL (ref 6–8.5)
RBC # BLD AUTO: 4.88 10*6/MM3 (ref 3.77–5.28)
SODIUM BLD-SCNC: 142 MMOL/L (ref 136–145)
WBC NRBC COR # BLD: 6.07 10*3/MM3 (ref 3.4–10.8)

## 2020-04-23 PROCEDURE — 25010000002 MIDAZOLAM PER 1 MG: Performed by: INTERNAL MEDICINE

## 2020-04-23 PROCEDURE — 83735 ASSAY OF MAGNESIUM: CPT | Performed by: INTERNAL MEDICINE

## 2020-04-23 PROCEDURE — 93306 TTE W/DOPPLER COMPLETE: CPT | Performed by: INTERNAL MEDICINE

## 2020-04-23 PROCEDURE — 80053 COMPREHEN METABOLIC PANEL: CPT | Performed by: INTERNAL MEDICINE

## 2020-04-23 PROCEDURE — 85025 COMPLETE CBC W/AUTO DIFF WBC: CPT | Performed by: INTERNAL MEDICINE

## 2020-04-23 PROCEDURE — 92960 CARDIOVERSION ELECTRIC EXT: CPT

## 2020-04-23 PROCEDURE — S0260 H&P FOR SURGERY: HCPCS | Performed by: INTERNAL MEDICINE

## 2020-04-23 PROCEDURE — 36415 COLL VENOUS BLD VENIPUNCTURE: CPT

## 2020-04-23 PROCEDURE — 93005 ELECTROCARDIOGRAM TRACING: CPT | Performed by: INTERNAL MEDICINE

## 2020-04-23 PROCEDURE — 93306 TTE W/DOPPLER COMPLETE: CPT

## 2020-04-23 PROCEDURE — 92960 CARDIOVERSION ELECTRIC EXT: CPT | Performed by: INTERNAL MEDICINE

## 2020-04-23 RX ORDER — FENTANYL CITRATE 50 UG/ML
INJECTION, SOLUTION INTRAMUSCULAR; INTRAVENOUS
Status: DISCONTINUED
Start: 2020-04-23 | End: 2020-04-23 | Stop reason: WASHOUT

## 2020-04-23 RX ORDER — FLUMAZENIL 0.1 MG/ML
INJECTION INTRAVENOUS
Status: DISCONTINUED
Start: 2020-04-23 | End: 2020-04-23 | Stop reason: WASHOUT

## 2020-04-23 RX ORDER — SODIUM CHLORIDE 0.9 % (FLUSH) 0.9 %
3 SYRINGE (ML) INJECTION EVERY 12 HOURS SCHEDULED
Status: DISCONTINUED | OUTPATIENT
Start: 2020-04-23 | End: 2020-04-23 | Stop reason: HOSPADM

## 2020-04-23 RX ORDER — MIDAZOLAM HYDROCHLORIDE 1 MG/ML
INJECTION INTRAMUSCULAR; INTRAVENOUS
Status: COMPLETED | OUTPATIENT
Start: 2020-04-23 | End: 2020-04-23

## 2020-04-23 RX ORDER — ONDANSETRON 2 MG/ML
4 INJECTION INTRAMUSCULAR; INTRAVENOUS EVERY 6 HOURS PRN
Status: DISCONTINUED | OUTPATIENT
Start: 2020-04-23 | End: 2020-04-23 | Stop reason: HOSPADM

## 2020-04-23 RX ORDER — ACETAMINOPHEN 325 MG/1
650 TABLET ORAL EVERY 4 HOURS PRN
Status: DISCONTINUED | OUTPATIENT
Start: 2020-04-23 | End: 2020-04-23 | Stop reason: HOSPADM

## 2020-04-23 RX ORDER — SODIUM CHLORIDE 0.9 % (FLUSH) 0.9 %
10 SYRINGE (ML) INJECTION AS NEEDED
Status: DISCONTINUED | OUTPATIENT
Start: 2020-04-23 | End: 2020-04-23 | Stop reason: HOSPADM

## 2020-04-23 RX ORDER — NALOXONE HYDROCHLORIDE 0.4 MG/ML
INJECTION, SOLUTION INTRAMUSCULAR; INTRAVENOUS; SUBCUTANEOUS
Status: DISCONTINUED
Start: 2020-04-23 | End: 2020-04-23 | Stop reason: WASHOUT

## 2020-04-23 RX ORDER — MIDAZOLAM HYDROCHLORIDE 1 MG/ML
INJECTION INTRAMUSCULAR; INTRAVENOUS
Status: DISCONTINUED
Start: 2020-04-23 | End: 2020-04-23 | Stop reason: HOSPADM

## 2020-04-23 RX ADMIN — METHOHEXITAL SODIUM 10 MG: 500 INJECTION, POWDER, LYOPHILIZED, FOR SOLUTION INTRAMUSCULAR; INTRAVENOUS; RECTAL at 10:43

## 2020-04-23 RX ADMIN — MIDAZOLAM 1 MG: 1 INJECTION INTRAMUSCULAR; INTRAVENOUS at 10:41

## 2020-04-23 RX ADMIN — METHOHEXITAL SODIUM 10 MG: 500 INJECTION, POWDER, LYOPHILIZED, FOR SOLUTION INTRAMUSCULAR; INTRAVENOUS; RECTAL at 10:41

## 2020-04-23 RX ADMIN — METHOHEXITAL SODIUM 10 MG: 500 INJECTION, POWDER, LYOPHILIZED, FOR SOLUTION INTRAMUSCULAR; INTRAVENOUS; RECTAL at 10:44

## 2020-04-23 NOTE — H&P
Primary EP: Dr. Camacho     Chief Complaint: afib       Subjective:     Patient is a 85 y.o. female who presents with symptomatic persistent afib for ECV. She had a telephone visit last week with Dr. Camacho and has had continued weakness and malaise secondary to Afib. She was started on Amiodarone for short course until she can be admitted for Sotalol. No changes since her last visit. No cp, fever, cough.     Cardiac PMH: (Old records have been reviewed and summarized below)  1. Severe Bradycardia              A)Holter 2014 Bradycardia              B)[facility] Pacemaker 2014     2. Chest Pain              A)Echo EF normal,  mild AI 2010              b)Negative MPS 2014/Moderate calcium score 2014-Medical therapy      2. HTN- controlled  3. HLD-Statin   4. Hypothyroidism- Chronic synthroid     Past Medical History:   Diagnosis Date   • Bradycardia    • Heartburn    • Hyperlipidemia    • Hypothyroid       Past Surgical History:   Procedure Laterality Date   • CATARACT EXTRACTION Right    • INSERT / REPLACE / REMOVE PACEMAKER     • TUBAL ABDOMINAL LIGATION        No Known Allergies  Social History     Tobacco Use   • Smoking status: Never Smoker   • Smokeless tobacco: Never Used   Substance Use Topics   • Alcohol use: No      FH:   Family History   Problem Relation Age of Onset   • Lung cancer Mother    • Heart disease Brother           Current Facility-Administered Medications:   •  acetaminophen (TYLENOL) tablet 650 mg, 650 mg, Oral, Q4H PRN, Kristine Mcdonough PA  •  fentaNYL citrate (PF) (SUBLIMAZE) 100 MCG/2ML injection  - ADS Override Pull, , , ,   •  flumazenil (ROMAZICON) 0.5 MG/5ML injection  - ADS Override Pull, , , ,   •  methohexital (BREVITAL) 50 MG/5ML injection solution prefilled syringe  - ADS Override Pull, , , ,   •  midazolam (VERSED) 2 MG/2ML injection  - ADS Override Pull, , , ,   •  naloxone (NARCAN) 0.4 MG/ML injection  - ADS Override Pull, , , ,   •  ondansetron (ZOFRAN) injection 4 mg, 4 mg,  "Intravenous, Q6H PRN, Kristine Mcdonough PA  •  sodium chloride 0.9 % flush 10 mL, 10 mL, Intravenous, PRN, Kristine Mcdonough PA  •  sodium chloride 0.9 % flush 3 mL, 3 mL, Intravenous, Q12H, Kristine Mcdonough PA    Review of Systems  Review of Systems:  General: no recent weight loss/gain, + weakness or fatigue  Skin: no rashes, lumps, or other skin changes  HEENT: no dizziness, lightheadedness, or vision changes  Respiratory: no cough or hemoptysis  Cardiovascular: + palpitations, and tachycardia  Gastrointestinal: no black/tarry stools or diarrhea  Urinary: no change in frequency or urgency  Peripheral Vascular: no claudication or leg cramps  Musculoskeletal: no muscle or joint pain/stiffness  Psychiatric: no depression or excessive stress  Neurological: no sensory or motor loss, no syncope  Hematologic: no anemia, easy bruising or bleeding  Endocrine: no thyroid problems, nor heat or cold intolerance        Objective:       /81 (BP Location: Left arm, Patient Position: Lying)   Pulse 78   Resp 16   Ht 157.5 cm (62\")   Wt 63.3 kg (139 lb 8.8 oz)   SpO2 99%   BMI 25.52 kg/m²     No intake/output data recorded.  No intake/output data recorded.    Physical Exam:  General-Well Nourished, Well developed  Eyes - PERRLA  Neck- supple, No mass  CV- irregular rate and rhythm, no MRG  Lung- clear bilaterally  Abd- soft, +BS  Musc/skel - Norm strength and range of motion  Skin- warm and dry  Neuro - Alert & Oriented x 3, appropriate mood.      Data Review:     Recent Results (from the past 24 hour(s))   CBC Auto Differential    Collection Time: 04/23/20 10:01 AM   Result Value Ref Range    WBC 6.07 3.40 - 10.80 10*3/mm3    RBC 4.88 3.77 - 5.28 10*6/mm3    Hemoglobin 16.0 (H) 12.0 - 15.9 g/dL    Hematocrit 46.9 (H) 34.0 - 46.6 %    MCV 96.1 79.0 - 97.0 fL    MCH 32.8 26.6 - 33.0 pg    MCHC 34.1 31.5 - 35.7 g/dL    RDW 12.3 12.3 - 15.4 %    RDW-SD 43.2 37.0 - 54.0 fl    MPV 9.6 6.0 - 12.0 fL    Platelets 219 140 - 450 " 10*3/mm3    Neutrophil % 55.0 42.7 - 76.0 %    Lymphocyte % 35.3 19.6 - 45.3 %    Monocyte % 7.7 5.0 - 12.0 %    Eosinophil % 1.5 0.3 - 6.2 %    Basophil % 0.3 0.0 - 1.5 %    Immature Grans % 0.2 0.0 - 0.5 %    Neutrophils, Absolute 3.34 1.70 - 7.00 10*3/mm3    Lymphocytes, Absolute 2.14 0.70 - 3.10 10*3/mm3    Monocytes, Absolute 0.47 0.10 - 0.90 10*3/mm3    Eosinophils, Absolute 0.09 0.00 - 0.40 10*3/mm3    Basophils, Absolute 0.02 0.00 - 0.20 10*3/mm3    Immature Grans, Absolute 0.01 0.00 - 0.05 10*3/mm3    nRBC 0.0 0.0 - 0.2 /100 WBC           Assessment:     Paroxysmal atrial fibrillation (CMS/HCC)         Plan:   1.  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 Normal function today. A pacing 92%.  2.  HTN: Controlled off meds. Limit sodium.  Increase exercise.   3.  Atrial Fib/Flutter: Increased burden in April 2020. Amiodarone loading started last week and patient will undergo ECV today. Plan is for short course until she can be admitted for sotalol loading following COVID-19 restrictions. Check ECHO post ECV. Continue Eliquis and Metoprolol.     Electronically signed by YURI Abraham, 04/23/20, 10:32 AM.

## 2020-04-23 NOTE — PROCEDURES
PRE-ELECTROPHYSIOLOGY STUDY DIAGNOSIS: Atrial fibrillation.    PROCEDURE PERFORMED: External electrical cardioversion.    Anesthesia: Sedation with:  1. 1 mg Versed  2. 30 mg Brevital    Estimated Blood Loss: Less than 10 mL     Specimens: None    PROCEDURE IN DETAIL: The patient was brought into the [facility] in a fasting  state. The patient was given moderate sedation during which he received 120  joules of external electrical cardioversion that converted atrial  fibrillation to normal sinus heart rhythm.    IMPRESSION: Successful conversion of atrial fibrillation to normal sinus  heart rhythm.

## 2020-04-29 ENCOUNTER — TELEPHONE (OUTPATIENT)
Dept: CARDIOLOGY | Facility: CLINIC | Age: 85
End: 2020-04-29

## 2020-04-29 DIAGNOSIS — I48.91 ATRIAL FIBRILLATION, UNSPECIFIED TYPE (HCC): ICD-10-CM

## 2020-04-29 DIAGNOSIS — I48.0 PAROXYSMAL ATRIAL FIBRILLATION (HCC): Primary | ICD-10-CM

## 2020-04-29 NOTE — TELEPHONE ENCOUNTER
----- Message from Kalia Camacho MD sent at 4/29/2020  2:27 PM EDT -----  Needs a CV scheduled with me next Thursday

## 2020-05-04 PROCEDURE — U0004 COV-19 TEST NON-CDC HGH THRU: HCPCS | Performed by: INTERNAL MEDICINE

## 2020-05-04 PROCEDURE — U0002 COVID-19 LAB TEST NON-CDC: HCPCS | Performed by: INTERNAL MEDICINE

## 2020-05-06 ENCOUNTER — DOCUMENTATION (OUTPATIENT)
Dept: CARDIOLOGY | Facility: CLINIC | Age: 85
End: 2020-05-06

## 2020-05-06 ENCOUNTER — PREP FOR SURGERY (OUTPATIENT)
Dept: OTHER | Facility: HOSPITAL | Age: 85
End: 2020-05-06

## 2020-05-06 DIAGNOSIS — I48.19 ATRIAL FIBRILLATION, PERSISTENT (HCC): Primary | ICD-10-CM

## 2020-05-06 RX ORDER — ONDANSETRON 2 MG/ML
4 INJECTION INTRAMUSCULAR; INTRAVENOUS EVERY 6 HOURS PRN
Status: CANCELLED | OUTPATIENT
Start: 2020-05-06

## 2020-05-06 RX ORDER — ACETAMINOPHEN 325 MG/1
650 TABLET ORAL EVERY 4 HOURS PRN
Status: CANCELLED | OUTPATIENT
Start: 2020-05-06

## 2020-05-06 RX ORDER — SODIUM CHLORIDE 0.9 % (FLUSH) 0.9 %
3 SYRINGE (ML) INJECTION EVERY 12 HOURS SCHEDULED
Status: CANCELLED | OUTPATIENT
Start: 2020-05-06

## 2020-05-06 RX ORDER — SODIUM CHLORIDE 0.9 % (FLUSH) 0.9 %
10 SYRINGE (ML) INJECTION AS NEEDED
Status: CANCELLED | OUTPATIENT
Start: 2020-05-06

## 2020-05-06 NOTE — PROGRESS NOTES
Carli Carver    1935    ECV    Contact:   Carlotta Blanca (Daughter)                    [phone]

## 2020-05-07 ENCOUNTER — HOSPITAL ENCOUNTER (OUTPATIENT)
Dept: CARDIOLOGY | Facility: HOSPITAL | Age: 85
Discharge: HOME | End: 2020-05-07
Attending: INTERNAL MEDICINE | Admitting: INTERNAL MEDICINE

## 2020-05-07 VITALS
WEIGHT: 141.76 LBS | TEMPERATURE: 97.6 F | OXYGEN SATURATION: 100 % | SYSTOLIC BLOOD PRESSURE: 108 MMHG | HEIGHT: 62 IN | RESPIRATION RATE: 16 BRPM | BODY MASS INDEX: 26.09 KG/M2 | HEART RATE: 69 BPM | DIASTOLIC BLOOD PRESSURE: 68 MMHG

## 2020-05-07 DIAGNOSIS — I48.0 PAROXYSMAL ATRIAL FIBRILLATION (HCC): ICD-10-CM

## 2020-05-07 DIAGNOSIS — I48.91 ATRIAL FIBRILLATION, UNSPECIFIED TYPE (HCC): ICD-10-CM

## 2020-05-07 LAB
ALBUMIN SERPL-MCNC: 4.6 G/DL (ref 3.5–5.2)
ALBUMIN/GLOB SERPL: 1.6 G/DL
ALP SERPL-CCNC: 80 U/L (ref 39–117)
ALT SERPL W P-5'-P-CCNC: 32 U/L (ref 1–33)
ANION GAP SERPL CALCULATED.3IONS-SCNC: 15 MMOL/L (ref 5–15)
AST SERPL-CCNC: 22 U/L (ref 1–32)
BILIRUB SERPL-MCNC: 1.4 MG/DL (ref 0.2–1.2)
BUN BLD-MCNC: 19 MG/DL (ref 8–23)
BUN/CREAT SERPL: 20.9 (ref 7–25)
CALCIUM SPEC-SCNC: 9.5 MG/DL (ref 8.6–10.5)
CHLORIDE SERPL-SCNC: 104 MMOL/L (ref 98–107)
CO2 SERPL-SCNC: 22 MMOL/L (ref 22–29)
CREAT BLD-MCNC: 0.91 MG/DL (ref 0.57–1)
DEPRECATED RDW RBC AUTO: 43.7 FL (ref 37–54)
ERYTHROCYTE [DISTWIDTH] IN BLOOD BY AUTOMATED COUNT: 12.5 % (ref 12.3–15.4)
GFR SERPL CREATININE-BSD FRML MDRD: 59 ML/MIN/1.73
GLOBULIN UR ELPH-MCNC: 2.8 GM/DL
GLUCOSE BLD-MCNC: 124 MG/DL (ref 65–99)
HCT VFR BLD AUTO: 44.8 % (ref 34–46.6)
HGB BLD-MCNC: 14.9 G/DL (ref 12–15.9)
MAGNESIUM SERPL-MCNC: 2.3 MG/DL (ref 1.6–2.4)
MCH RBC QN AUTO: 32.2 PG (ref 26.6–33)
MCHC RBC AUTO-ENTMCNC: 33.3 G/DL (ref 31.5–35.7)
MCV RBC AUTO: 96.8 FL (ref 79–97)
PLATELET # BLD AUTO: 205 10*3/MM3 (ref 140–450)
PMV BLD AUTO: 9.8 FL (ref 6–12)
POTASSIUM BLD-SCNC: 4.3 MMOL/L (ref 3.5–5.2)
PROT SERPL-MCNC: 7.4 G/DL (ref 6–8.5)
RBC # BLD AUTO: 4.63 10*6/MM3 (ref 3.77–5.28)
SODIUM BLD-SCNC: 141 MMOL/L (ref 136–145)
WBC NRBC COR # BLD: 6.31 10*3/MM3 (ref 3.4–10.8)

## 2020-05-07 PROCEDURE — 36415 COLL VENOUS BLD VENIPUNCTURE: CPT

## 2020-05-07 PROCEDURE — 25010000002 AMIODARONE IN DEXTROSE 5% 150-4.21 MG/100ML-% SOLUTION: Performed by: INTERNAL MEDICINE

## 2020-05-07 PROCEDURE — S0260 H&P FOR SURGERY: HCPCS | Performed by: INTERNAL MEDICINE

## 2020-05-07 PROCEDURE — 83735 ASSAY OF MAGNESIUM: CPT | Performed by: INTERNAL MEDICINE

## 2020-05-07 PROCEDURE — 80053 COMPREHEN METABOLIC PANEL: CPT | Performed by: INTERNAL MEDICINE

## 2020-05-07 PROCEDURE — 92960 CARDIOVERSION ELECTRIC EXT: CPT

## 2020-05-07 PROCEDURE — 93005 ELECTROCARDIOGRAM TRACING: CPT | Performed by: INTERNAL MEDICINE

## 2020-05-07 PROCEDURE — 25010000002 MIDAZOLAM PER 1 MG: Performed by: INTERNAL MEDICINE

## 2020-05-07 PROCEDURE — 85027 COMPLETE CBC AUTOMATED: CPT | Performed by: INTERNAL MEDICINE

## 2020-05-07 PROCEDURE — 92960 CARDIOVERSION ELECTRIC EXT: CPT | Performed by: INTERNAL MEDICINE

## 2020-05-07 RX ORDER — MIDAZOLAM HYDROCHLORIDE 1 MG/ML
INJECTION INTRAMUSCULAR; INTRAVENOUS
Status: COMPLETED | OUTPATIENT
Start: 2020-05-07 | End: 2020-05-07

## 2020-05-07 RX ORDER — SODIUM CHLORIDE 0.9 % (FLUSH) 0.9 %
3 SYRINGE (ML) INJECTION EVERY 12 HOURS SCHEDULED
Status: DISCONTINUED | OUTPATIENT
Start: 2020-05-07 | End: 2020-05-07 | Stop reason: HOSPADM

## 2020-05-07 RX ORDER — ONDANSETRON 2 MG/ML
4 INJECTION INTRAMUSCULAR; INTRAVENOUS EVERY 6 HOURS PRN
Status: DISCONTINUED | OUTPATIENT
Start: 2020-05-07 | End: 2020-05-07 | Stop reason: HOSPADM

## 2020-05-07 RX ORDER — FLUMAZENIL 0.1 MG/ML
INJECTION INTRAVENOUS
Status: DISCONTINUED
Start: 2020-05-07 | End: 2020-05-07 | Stop reason: WASHOUT

## 2020-05-07 RX ORDER — SODIUM CHLORIDE 0.9 % (FLUSH) 0.9 %
10 SYRINGE (ML) INJECTION AS NEEDED
Status: DISCONTINUED | OUTPATIENT
Start: 2020-05-07 | End: 2020-05-07 | Stop reason: HOSPADM

## 2020-05-07 RX ORDER — MIDAZOLAM HYDROCHLORIDE 1 MG/ML
INJECTION INTRAMUSCULAR; INTRAVENOUS
Status: DISCONTINUED
Start: 2020-05-07 | End: 2020-05-07 | Stop reason: HOSPADM

## 2020-05-07 RX ORDER — ACETAMINOPHEN 325 MG/1
650 TABLET ORAL EVERY 4 HOURS PRN
Status: DISCONTINUED | OUTPATIENT
Start: 2020-05-07 | End: 2020-05-07 | Stop reason: HOSPADM

## 2020-05-07 RX ADMIN — METHOHEXITAL SODIUM 20 MG: 500 INJECTION, POWDER, LYOPHILIZED, FOR SOLUTION INTRAMUSCULAR; INTRAVENOUS; RECTAL at 10:03

## 2020-05-07 RX ADMIN — MIDAZOLAM 1 MG: 1 INJECTION INTRAMUSCULAR; INTRAVENOUS at 09:59

## 2020-05-07 RX ADMIN — METHOHEXITAL SODIUM 10 MG: 500 INJECTION, POWDER, LYOPHILIZED, FOR SOLUTION INTRAMUSCULAR; INTRAVENOUS; RECTAL at 10:02

## 2020-05-07 RX ADMIN — MIDAZOLAM 1 MG: 1 INJECTION INTRAMUSCULAR; INTRAVENOUS at 10:18

## 2020-05-07 RX ADMIN — METHOHEXITAL SODIUM 30 MG: 500 INJECTION, POWDER, LYOPHILIZED, FOR SOLUTION INTRAMUSCULAR; INTRAVENOUS; RECTAL at 10:18

## 2020-05-07 RX ADMIN — METHOHEXITAL SODIUM 20 MG: 500 INJECTION, POWDER, LYOPHILIZED, FOR SOLUTION INTRAMUSCULAR; INTRAVENOUS; RECTAL at 10:00

## 2020-05-07 NOTE — H&P
Primary EP: Dr. Camacho     Chief Complaint: afib       Subjective:     Patient is a 85 y.o. female who presents with symptomatic persistent afib for ECV. She underwent successful ECV on 4/23/2020 but now has developed recurrent atrial fibrillation, symptomatic in nature and presents for repeat ECV with Dr. Camacho.    Cardiac PMH: (Old records have been reviewed and summarized below)  1. Persistent atrial fibrillation   A) CHADSVASc = 4, on Eliquis   B) Amiodarone 04/2020   C) S/p successful ECV, 4/23/2020   D) Echocardiogram 4/23/2020: EF 56-60%, mild concentric LVH, mild to moderate MR  1. Severe Bradycardia              A)Holter 2014 Bradycardia              B)[facility] Pacemaker 2014     2. Chest Pain              A)Echo EF normal,  mild AI 2010              b)Negative MPS 2014/Moderate calcium score 2014-Medical therapy      2. HTN- controlled  3. HLD-Statin   4. Hypothyroidism- Chronic synthroid     Past Medical History:   Diagnosis Date   • Bradycardia    • Heartburn    • Hyperlipidemia    • Hypothyroid       Past Surgical History:   Procedure Laterality Date   • CATARACT EXTRACTION Right    • INSERT / REPLACE / REMOVE PACEMAKER     • TUBAL ABDOMINAL LIGATION        No Known Allergies  Social History     Tobacco Use   • Smoking status: Never Smoker   • Smokeless tobacco: Never Used   Substance Use Topics   • Alcohol use: No      FH:   Family History   Problem Relation Age of Onset   • Lung cancer Mother    • Heart disease Brother           Current Facility-Administered Medications:   •  acetaminophen (TYLENOL) tablet 650 mg, 650 mg, Oral, Q4H PRN, Kristine Mcdonough, PA  •  ondansetron (ZOFRAN) injection 4 mg, 4 mg, Intravenous, Q6H PRN, Kristine Mcdonough, PA  •  sodium chloride 0.9 % flush 10 mL, 10 mL, Intravenous, PRN, Kristine Mcdonough, PA  •  sodium chloride 0.9 % flush 3 mL, 3 mL, Intravenous, Q12H, Kristine Mcdonough PA    Review of Systems  Review of Systems:  General: no recent weight loss/gain, + weakness or  fatigue  Skin: no rashes, lumps, or other skin changes  HEENT: no dizziness, lightheadedness, or vision changes  Respiratory: no cough or hemoptysis  Cardiovascular: + palpitations, and tachycardia  Gastrointestinal: no black/tarry stools or diarrhea  Urinary: no change in frequency or urgency  Peripheral Vascular: no claudication or leg cramps  Musculoskeletal: no muscle or joint pain/stiffness  Psychiatric: no depression or excessive stress  Neurological: no sensory or motor loss, no syncope  Hematologic: no anemia, easy bruising or bleeding  Endocrine: no thyroid problems, nor heat or cold intolerance        Objective:       There were no vitals taken for this visit.    No intake/output data recorded.  No intake/output data recorded.    Physical Exam:  General-Well Nourished, Well developed  Eyes - PERRLA  Neck- supple, No mass  CV- irregular rate and rhythm, no MRG  Lung- clear bilaterally  Abd- soft, +BS  Musc/skel - Norm strength and range of motion  Skin- warm and dry  Neuro - Alert & Oriented x 3, appropriate mood.      Data Review:     No results found for this or any previous visit (from the past 24 hour(s)).        Assessment:     Paroxysmal atrial fibrillation (CMS/HCC)         Plan:   1.  Atrial Fib/Flutter: Persistent atrial fibrillation, symptomatic in nature, on amiodarone.  Plan for external cardioversion today with Dr. Camacho.  The risks, benefits, and alternatives of the procedure have been reviewed and the patient wishes to proceed.  On Eliquis with no missed doses.    2.  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    Electronically signed by LYUDMILA Ramos, 05/07/20, 9:23 AM.

## 2020-05-07 NOTE — PROCEDURES
PRE-ELECTROPHYSIOLOGY STUDY DIAGNOSIS: Atrial flutter    PROCEDURE PERFORMED: External electrical cardioversion.    Anesthesia: Sedation with:  1. 2 mg Versed  2. 80 mg Brevital    Estimated Blood Loss: Less than 10 mL     Specimens: None    PROCEDURE IN DETAIL: The patient was brought into the [facility] in a fasting  state. The patient was given moderate sedation during which he received 150  joules of external electrical cardioversion that converted atrial  flutter to normal sinus heart rhythm.  However, within 5 seconds patient will convert back to an atypical atrial flutter pattern.  This was repeated for a total of 7 times at various joules.  Each time is able to convert the patient to normal sinus rhythm however she failed to maintain normal sinus rhythm for greater than 10 seconds.  The patient also received an additional bolus of amiodarone 150 mg.    IMPRESSION: Successful conversion of atrial flutter to normal sinus  heart rhythm, however, failed to maintain normal sinus rhythm for more than 10 seconds.

## 2020-05-15 ENCOUNTER — TELEPHONE (OUTPATIENT)
Dept: CARDIOLOGY | Facility: CLINIC | Age: 85
End: 2020-05-15

## 2020-05-15 NOTE — TELEPHONE ENCOUNTER
Daughter called with concerns of her mother just not feeling well.  She is fatigue and just has no energy.  She has had 2 failed cardioversions and has a follow up with you June 1st.  Daughter is wanting a sooner appt.  Spoke with scheduling and they wanted me to ask Kristine if ok to move up.

## 2020-05-20 ENCOUNTER — OFFICE VISIT (OUTPATIENT)
Dept: CARDIOLOGY | Facility: CLINIC | Age: 85
End: 2020-05-20

## 2020-05-20 VITALS
SYSTOLIC BLOOD PRESSURE: 120 MMHG | BODY MASS INDEX: 25.23 KG/M2 | TEMPERATURE: 98.4 F | HEART RATE: 82 BPM | WEIGHT: 142.4 LBS | OXYGEN SATURATION: 99 % | DIASTOLIC BLOOD PRESSURE: 82 MMHG | HEIGHT: 63 IN

## 2020-05-20 DIAGNOSIS — I48.0 PAROXYSMAL ATRIAL FIBRILLATION (HCC): Primary | ICD-10-CM

## 2020-05-20 DIAGNOSIS — R00.1 BRADYCARDIA: ICD-10-CM

## 2020-05-20 PROCEDURE — 93280 PM DEVICE PROGR EVAL DUAL: CPT | Performed by: PHYSICIAN ASSISTANT

## 2020-05-20 PROCEDURE — 99214 OFFICE O/P EST MOD 30 MIN: CPT | Performed by: PHYSICIAN ASSISTANT

## 2020-05-20 NOTE — PROGRESS NOTES
Carli Carver  1935  PCP: Maria Antonia Rivera MD    SUBJECTIVE:   Carli Carver is a 85 y.o. female seen for a follow up visit regarding the following:     Chief Complaint: Follow up for Persistent afib, bradycardia     HPI:    Since last visit the patient's status has been unstable. She had a failed ECV 5/7/20 and was unable to maintain sinus for more than 10 seconds. She continues to feel fatigue, SOB, and have palpitations. She appears to be in a slow flutter that is left-sided today and we attempted to pace her out with no success. She has not had any chest pain or edema.     Cardiac PMH: (Old records have been reviewed and summarized below)  1. Persistent atrial fibrillation              A) CHADSVASc = 4, on Eliquis              B) Amiodarone 04/2020              C) S/p successful ECV, 4/23/2020              D) Echocardiogram 4/23/2020: EF 56-60%, mild concentric LVH, mild to moderate MR  1. Severe Bradycardia              A)Holter 2014 Bradycardia              B)[employer] Pacemaker 2014     2. Chest Pain              A)Echo EF normal,  mild AI 2010              b)Negative MPS 2014/Moderate calcium score 2014-Medical therapy      2. HTN- controlled  3. HLD-Statin   4. Hypothyroidism- Chronic synthroid       Current Outpatient Medications:   •  apixaban (Eliquis) 5 MG tablet tablet, Take 1 tablet by mouth 2 (Two) Times a Day., Disp: 60 tablet, Rfl: 11  •  atorvastatin (LIPITOR) 20 MG tablet, Take 20 mg by mouth Daily., Disp: , Rfl:   •  BIOTIN PO, Take 10,000 mcg by mouth Daily., Disp: , Rfl:   •  Calcium Carb-Cholecalciferol (CALCIUM 600-D PO), Take 1 tablet by mouth 2 (Two) Times a Day., Disp: , Rfl:   •  calcium carbonate (OS-YUNIER) 600 MG tablet, Take 1,200 mg by mouth Daily., Disp: , Rfl:   •  calcium carbonate (TUMS) 500 MG chewable tablet, Chew 1 tablet As Needed for Indigestion or Heartburn., Disp: , Rfl:   •  DHA-EPA-Vitamin E (OMEGA-3 COMPLEX PO), Take 1 capsule by mouth Daily.,  "Disp: , Rfl:   •  Garlic (GARLIQUE PO), Take 1 tablet by mouth Daily., Disp: , Rfl:   •  levothyroxine (SYNTHROID, LEVOTHROID) 88 MCG tablet, Take 88 mcg by mouth Daily., Disp: , Rfl:   •  Magnesium 250 MG tablet, Take 250 mg by mouth Every Other Day., Disp: , Rfl:   •  Melatonin 10 MG capsule, Take 10 mg by mouth Every Night. Takes 1-2 nightly, Disp: , Rfl:   •  metoprolol tartrate (LOPRESSOR) 25 MG tablet, Take 1 tablet by mouth 2 (Two) Times a Day., Disp: 180 tablet, Rfl: 3  •  NON FORMULARY, Take 1 dose by mouth Daily. ALPHA CRS, CELLULAR VITALITY COMPLEX, Disp: , Rfl:   •  NON FORMULARY, Take 1 dose by mouth Daily. MICRO PLEX VMZ, FOOD NUTRIENT COMPLEX DIETARY SUPP, Disp: , Rfl:     Past Medical History, Past Surgical History, Family history, Social History, and Medications were all reviewed with the patient today and updated as necessary.       Patient Active Problem List   Diagnosis   • Bradycardia   • Paroxysmal atrial fibrillation (CMS/HCC)     No Known Allergies  Past Medical History:   Diagnosis Date   • Bradycardia    • Heartburn    • Hyperlipidemia    • Hypothyroid      Past Surgical History:   Procedure Laterality Date   • ABLATION OF DYSRHYTHMIC FOCUS     • CATARACT EXTRACTION Right    • INSERT / REPLACE / REMOVE PACEMAKER     • TUBAL ABDOMINAL LIGATION       Family History   Problem Relation Age of Onset   • Lung cancer Mother    • Heart disease Brother      Social History     Tobacco Use   • Smoking status: Never Smoker   • Smokeless tobacco: Never Used   Substance Use Topics   • Alcohol use: No         PHYSICAL EXAM:    /82 (BP Location: Right arm, Patient Position: Sitting)   Pulse 82   Temp 98.4 °F (36.9 °C)   Ht 160 cm (63\")   Wt 64.6 kg (142 lb 6.4 oz)   SpO2 99%   BMI 25.23 kg/m²        Wt Readings from Last 5 Encounters:   05/20/20 64.6 kg (142 lb 6.4 oz)   05/07/20 64.3 kg (141 lb 12.1 oz)   04/23/20 62.1 kg (137 lb)   04/09/20 62.5 kg (137 lb 12.8 oz)   09/24/19 63.5 kg (140 lb) "       BP Readings from Last 5 Encounters:   05/20/20 120/82   05/07/20 108/68   04/23/20 105/71   09/24/19 124/72   03/18/19 128/72       General-Well Nourished, Well developed  Eyes - PERRLA  Neck- supple, No mass  CV- irregular rate and rhythm, no MRG, No edema  Lung- clear bilaterally  Abd- soft, +BS  Musc/skel - Norm strength and range of motion  Skin- warm and dry  Neuro - Alert & Oriented x 3, appropriate mood.        Medical problems and test results were reviewed with the patient today.     Recent Results (from the past 672 hour(s))   Comprehensive Metabolic Panel    Collection Time: 04/23/20 10:01 AM   Result Value Ref Range    Glucose 122 (H) 65 - 99 mg/dL    BUN 14 8 - 23 mg/dL    Creatinine 0.87 0.57 - 1.00 mg/dL    Sodium 142 136 - 145 mmol/L    Potassium 4.4 3.5 - 5.2 mmol/L    Chloride 105 98 - 107 mmol/L    CO2 25.0 22.0 - 29.0 mmol/L    Calcium 9.9 8.6 - 10.5 mg/dL    Total Protein 7.9 6.0 - 8.5 g/dL    Albumin 4.80 3.50 - 5.20 g/dL    ALT (SGPT) 15 1 - 33 U/L    AST (SGOT) 22 1 - 32 U/L    Alkaline Phosphatase 90 39 - 117 U/L    Total Bilirubin 1.6 (H) 0.2 - 1.2 mg/dL    eGFR Non African Amer 62 >60 mL/min/1.73    Globulin 3.1 gm/dL    A/G Ratio 1.5 g/dL    BUN/Creatinine Ratio 16.1 7.0 - 25.0    Anion Gap 12.0 5.0 - 15.0 mmol/L   Magnesium    Collection Time: 04/23/20 10:01 AM   Result Value Ref Range    Magnesium 2.4 1.6 - 2.4 mg/dL   CBC Auto Differential    Collection Time: 04/23/20 10:01 AM   Result Value Ref Range    WBC 6.07 3.40 - 10.80 10*3/mm3    RBC 4.88 3.77 - 5.28 10*6/mm3    Hemoglobin 16.0 (H) 12.0 - 15.9 g/dL    Hematocrit 46.9 (H) 34.0 - 46.6 %    MCV 96.1 79.0 - 97.0 fL    MCH 32.8 26.6 - 33.0 pg    MCHC 34.1 31.5 - 35.7 g/dL    RDW 12.3 12.3 - 15.4 %    RDW-SD 43.2 37.0 - 54.0 fl    MPV 9.6 6.0 - 12.0 fL    Platelets 219 140 - 450 10*3/mm3    Neutrophil % 55.0 42.7 - 76.0 %    Lymphocyte % 35.3 19.6 - 45.3 %    Monocyte % 7.7 5.0 - 12.0 %    Eosinophil % 1.5 0.3 - 6.2 %     Basophil % 0.3 0.0 - 1.5 %    Immature Grans % 0.2 0.0 - 0.5 %    Neutrophils, Absolute 3.34 1.70 - 7.00 10*3/mm3    Lymphocytes, Absolute 2.14 0.70 - 3.10 10*3/mm3    Monocytes, Absolute 0.47 0.10 - 0.90 10*3/mm3    Eosinophils, Absolute 0.09 0.00 - 0.40 10*3/mm3    Basophils, Absolute 0.02 0.00 - 0.20 10*3/mm3    Immature Grans, Absolute 0.01 0.00 - 0.05 10*3/mm3    nRBC 0.0 0.0 - 0.2 /100 WBC   Adult Transthoracic Echo Complete W/ Cont if Necessary Per Protocol    Collection Time: 04/23/20  1:38 PM   Result Value Ref Range    BSA 1.6 m^2    IVSd 1.1 cm    LVIDd 4.3 cm    LVIDs 3.0 cm    LVPWd 1.1 cm    IVS/LVPW 0.99     FS 29.8 %    EDV(Teich) 84.0 ml    ESV(Teich) 35.9 ml    EF(Teich) 57.3 %    EDV(cubed) 80.6 ml    ESV(cubed) 27.9 ml    EF(cubed) 65.5 %    LV mass(C)d 173.5 grams    LV mass(C)dI 106.6 grams/m^2    SV(Teich) 48.1 ml    SI(Teich) 29.5 ml/m^2    SV(cubed) 52.8 ml    SI(cubed) 32.4 ml/m^2    Ao root diam 3.4 cm    Ao root area 8.9 cm^2    LA dimension 4.3 cm    LA/Ao 1.3     LVOT diam 2.0 cm    LVOT area 3.2 cm^2    LVOT area(traced) 3.1 cm^2    LAd major 6.6 cm    BH CV ECHO MICHAEL - MPA DIAM 3.1 cm    BH CV ECHO MICHAEL - MPA AREA 7.3 cm^2    LVLd ap4 6.8 cm    EDV(MOD-sp4) 75.0 ml    LVLs ap4 5.3 cm    ESV(MOD-sp4) 30.0 ml    EF(MOD-sp4) 60.0 %    LVLd ap2 5.5 cm    EDV(MOD-sp2) 59.0 ml    LVLs ap2 4.2 cm    ESV(MOD-sp2) 26.0 ml    EF(MOD-sp2) 55.9 %    LA volume 79.0 ml    SV(MOD-sp4) 45.0 ml    SI(MOD-sp4) 27.6 ml/m^2    SV(MOD-sp2) 33.0 ml    SI(MOD-sp2) 20.3 ml/m^2    Ao root area (BSA corrected) 2.1     LV Bowie Vol (BSA corrected) 46.1 ml/m^2    LV Sys Vol (BSA corrected) 18.4 ml/m^2    LA Volume Index 48.5 ml/m^2    MV E max nisha 102.7 cm/sec    MV A max nisha 27.1 cm/sec    MV E/A 3.8     LV IVRT 0.09 sec    MV dec time 0.2 sec    AI max nisha 430.8 cm/sec    AI max PG 74.3 mmHg    AI dec slope 264.8 cm/sec^2    AI P1/2t 476.6 msec    LV V1 max PG 1.5 mmHg    LV V1 mean PG 0.73 mmHg    LV V1 max  60.7 cm/sec    LV V1 mean 39.8 cm/sec    LV V1 VTI 13.3 cm    SV(LVOT) 42.8 ml    SI(LVOT) 26.3 ml/m^2    PA acc slope 677.4 cm/sec^2    PA acc time 0.1 sec    PI end-d hi 104.7 cm/sec    TR max hi 288 cm/sec    TR max PG 33 mmHg    PA pr(Accel) 34.6 mmHg    Pulm Sys Hi 35.5 cm/sec    Pulm Bowie Hi 43.4 cm/sec    Pulm S/D 0.82     Pulm A Revs Hi 28.6 cm/sec    Lat E/e'  9.5     Med E/e' 13.9     Lat Peak E' Hi 10.6 cm/sec    Med Peak E' Hi 7.2 cm/sec     CV ECHO MICHAEL - BZI_BMI 25.1 kilograms/m^2     CV ECHO MICHAEL - BSA(HAYCOCK) 1.7 m^2     CV ECHO MICHAEL - BZI_METRIC_WEIGHT 62.1 kg     CV ECHO MICHAEL - BZI_METRIC_HEIGHT 157.5 cm    Avg E/e' ratio 11.54      CV VAS BP LEFT /69 mmHg    TDI S' 11.50 cm/sec    RV Base 4.98 cm    RV Length 6.30 cm    RV Mid 3.50 cm    Echo EF Estimated 60 %    EF(MOD-bp) 60 %    RAP systole 3 mmHg    RVSP(TR) 36 mmHg    TAPSE (>1.6) 2.00 cm2   CORONAVIRUS (COVID-19),RT-PCR,[facility], NP SWAB IN [facility] - Swab, Nasopharynx    Collection Time: 05/04/20 12:01 PM   Result Value Ref Range    Reference Lab Report      COVID19 Not Detected Not Detected - Ref. Range   CBC (No Diff)    Collection Time: 05/07/20  9:14 AM   Result Value Ref Range    WBC 6.31 3.40 - 10.80 10*3/mm3    RBC 4.63 3.77 - 5.28 10*6/mm3    Hemoglobin 14.9 12.0 - 15.9 g/dL    Hematocrit 44.8 34.0 - 46.6 %    MCV 96.8 79.0 - 97.0 fL    MCH 32.2 26.6 - 33.0 pg    MCHC 33.3 31.5 - 35.7 g/dL    RDW 12.5 12.3 - 15.4 %    RDW-SD 43.7 37.0 - 54.0 fl    MPV 9.8 6.0 - 12.0 fL    Platelets 205 140 - 450 10*3/mm3   Comprehensive Metabolic Panel    Collection Time: 05/07/20  9:14 AM   Result Value Ref Range    Glucose 124 (H) 65 - 99 mg/dL    BUN 19 8 - 23 mg/dL    Creatinine 0.91 0.57 - 1.00 mg/dL    Sodium 141 136 - 145 mmol/L    Potassium 4.3 3.5 - 5.2 mmol/L    Chloride 104 98 - 107 mmol/L    CO2 22.0 22.0 - 29.0 mmol/L    Calcium 9.5 8.6 - 10.5 mg/dL    Total Protein 7.4 6.0 - 8.5 g/dL    Albumin 4.60  3.50 - 5.20 g/dL    ALT (SGPT) 32 1 - 33 U/L    AST (SGOT) 22 1 - 32 U/L    Alkaline Phosphatase 80 39 - 117 U/L    Total Bilirubin 1.4 (H) 0.2 - 1.2 mg/dL    eGFR Non African Amer 59 (L) >60 mL/min/1.73    Globulin 2.8 gm/dL    A/G Ratio 1.6 g/dL    BUN/Creatinine Ratio 20.9 7.0 - 25.0    Anion Gap 15.0 5.0 - 15.0 mmol/L   Magnesium    Collection Time: 05/07/20  9:14 AM   Result Value Ref Range    Magnesium 2.3 1.6 - 2.4 mg/dL         EKG: (EKG has been independently visualized by me and summarized below)      ECG 12 Lead  Date/Time: 5/20/2020 2:32 PM  Performed by: Kristine Mcdonough PA  Authorized by: Kristine Mcdonough PA   Comparison: compared with previous ECG from 5/7/2020  Similar to previous ECG  Rhythm: atrial flutter  Rate: normal  BPM: 82  Conduction: conduction normal  QRS axis: normal    Clinical impression: abnormal EKG             ASSESSMENT and PLAN    1.  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 Normal function. Patient has been in persistent atrial flutter, afib.   2.  HTN: Controlled off meds. Limit sodium.  Increase exercise.   3.  Atrial Fib/Flutter: Increased burden in April 2020. She had amio load and ECV 4/23/20. She had a recurrence of afib and underwent ECV 5/7/20 and failed to maintain sinus for more than 10 seconds. Dr. Camacho, myself, and patient had a lengthy discussion regarding options including medications, PVA, and even AV node ablation. The patient and her daughter wish to proceed with least invasive option first and would also like to have a consult with Dr. Guerra to discuss ablation options. For now, will proceed with Tikosyn loading in 2-3 weeks. Stop Amiodarone today. Continue Eliquis and Lopressor.   4. Amio use- discontinue today. Plan for Tikosyn loading.       Return for after procedure.        Kristine Mcdonough PA-C   Cardiology/Electrophysiology  5/20/2020  14:33

## 2020-06-08 ENCOUNTER — PREP FOR SURGERY (OUTPATIENT)
Dept: OTHER | Facility: HOSPITAL | Age: 85
End: 2020-06-08

## 2020-06-08 RX ORDER — MAGNESIUM SULFATE HEPTAHYDRATE 40 MG/ML
4 INJECTION, SOLUTION INTRAVENOUS AS NEEDED
Status: CANCELLED | OUTPATIENT
Start: 2020-06-08

## 2020-06-08 RX ORDER — POTASSIUM CHLORIDE 1.5 G/1.77G
40 POWDER, FOR SOLUTION ORAL AS NEEDED
Status: CANCELLED | OUTPATIENT
Start: 2020-06-08

## 2020-06-08 RX ORDER — POTASSIUM CHLORIDE 750 MG/1
40 CAPSULE, EXTENDED RELEASE ORAL AS NEEDED
Status: CANCELLED | OUTPATIENT
Start: 2020-06-08

## 2020-06-08 RX ORDER — MAGNESIUM SULFATE HEPTAHYDRATE 40 MG/ML
2 INJECTION, SOLUTION INTRAVENOUS AS NEEDED
Status: CANCELLED | OUTPATIENT
Start: 2020-06-08

## 2020-06-15 ENCOUNTER — OFFICE VISIT (OUTPATIENT)
Dept: CARDIOLOGY | Facility: CLINIC | Age: 85
End: 2020-06-15

## 2020-06-15 VITALS
SYSTOLIC BLOOD PRESSURE: 122 MMHG | HEIGHT: 62 IN | OXYGEN SATURATION: 99 % | DIASTOLIC BLOOD PRESSURE: 74 MMHG | BODY MASS INDEX: 26.09 KG/M2 | WEIGHT: 141.8 LBS | HEART RATE: 95 BPM | TEMPERATURE: 96.2 F

## 2020-06-15 DIAGNOSIS — I49.5 SICK SINUS SYNDROME (HCC): ICD-10-CM

## 2020-06-15 DIAGNOSIS — I10 ESSENTIAL HYPERTENSION: ICD-10-CM

## 2020-06-15 DIAGNOSIS — Z95.0 CARDIAC PACEMAKER IN SITU: ICD-10-CM

## 2020-06-15 DIAGNOSIS — I48.0 PAROXYSMAL ATRIAL FIBRILLATION (HCC): Primary | ICD-10-CM

## 2020-06-15 PROCEDURE — 93280 PM DEVICE PROGR EVAL DUAL: CPT | Performed by: INTERNAL MEDICINE

## 2020-06-15 PROCEDURE — 99215 OFFICE O/P EST HI 40 MIN: CPT | Performed by: INTERNAL MEDICINE

## 2020-06-15 NOTE — PROGRESS NOTES
Cardiac Electrophysiology Outpatient Follow Up Note            [facility]     Follow Up Office Visit      Carli Carver  [id number]  06/15/2020    Primary Care Physician: Maria Antonia Rivera MD    Subjective     Chief Complaint:   Chief Complaint   Patient presents with   • Slow Heart Rate   • PAF     Cardiac PMH: (Old records have been reviewed and summarized below)    1. Persistent atrial fibrillation              A) CHADSVASc = 4, on Eliquis              B) Amiodarone 04/2020              C) S/p successful ECV, 4/23/2020              D) Echocardiogram 4/23/2020: EF 56-60%, mild concentric LVH, mild to moderate MR   E) Recurrent AFib 5/7/2020 with Amiodarone discontinued 5/20/2020     1.  Sick sinus syndrome              A)Holter 2014 Bradycardia              B)[employer] Pacemaker 2014     2. Chest Pain              A)Echo EF normal,  mild AI 2010              b)Negative MPS 2014/Moderate calcium score 2014-Medical therapy      2. HTN- controlled  3. HLD-Statin   4. Hypothyroidism- Chronic synthroid       History of Present Illness:   Mrs. Carli Carver is a 85 y.o. female who presents to my electrophysiology clinic for follow up of her persistent atrial fibrillation.  She is currently followed by Dr. Camacho with increased A. Fib/flutter initially noted on pacemaker interrogation.  She is noted to have failed amiodarone therapy with continued Eliquis and Lopressor therapy.  She also has documented failed ECV on 4/23/2020 and 5/7/2020 with conversion to normal rhythm but failure to maintain rhythm.  She was known to last be seen on 5/20/2020 with YURI Hayward with plan for discontinuation of amiodarone and planned admission for Tikosyn initiation.  Upon presentation today she reports not starting Tikosyn due to increased cost of medication.  She admits to symptoms of increased fatigue over the past few months with occasional pressure and flutter in  her chest.  She denies chest pain, dizziness, presyncope, or syncope.  She admits compliance with her anticoagulation therapy without noted side effects or signs of bleeding.  She denies TIA/strokelike symptoms.    Review of Systems:   Constitutional: No fevers or chills, no recent weight gain or weight loss or fatigue  Eyes: No visual loss, blurred vision, double vision, yellow sclerae.  ENT: No headaches, hearing loss, vertigo, congestion or sore throat.   Cardiovascular: Per HPI  Respiratory: No cough or wheezing, no sputum production, no hematemesis   Gastrointestinal: No abdominal pain, no nausea, vomiting, constipation, diarrhea, melena.   Genitourinary: No dysuria, hematuria or increased frequency.  Musculoskeletal:  No gait disturbance, weakness or joint pain or stiffness  Integumentary: No rashes, urticaria, ulcers or sores.   Neurological: No headache, dizziness, syncope, paralysis, ataxia, no prior CVA/TIA  Psychiatric: No anxiety, or depression  Endocrine: No diaphoresis, cold or heat intolerance. No polyuria or polydipsia.   Hematologic/Lymphatic: No anemia, abnormal bruising or bleeding. No history of DVT/PE.      Past Medical History:   Past Medical History:   Diagnosis Date   • Bradycardia    • Heartburn    • Hyperlipidemia    • Hypothyroid        Past Surgical History:   Past Surgical History:   Procedure Laterality Date   • ABLATION OF DYSRHYTHMIC FOCUS     • CATARACT EXTRACTION Right    • INSERT / REPLACE / REMOVE PACEMAKER     • TUBAL ABDOMINAL LIGATION         Family History:   Family History   Problem Relation Age of Onset   • Lung cancer Mother    • Heart disease Brother        Social History:   Social History     Socioeconomic History   • Marital status:      Spouse name: Not on file   • Number of children: Not on file   • Years of education: Not on file   • Highest education level: Not on file   Tobacco Use   • Smoking status: Never Smoker   • Smokeless tobacco: Never Used   Substance  "and Sexual Activity   • Alcohol use: No   • Drug use: No   • Sexual activity: Defer       Medications:     Current Outpatient Medications:   •  apixaban (Eliquis) 5 MG tablet tablet, Take 1 tablet by mouth 2 (Two) Times a Day., Disp: 60 tablet, Rfl: 11  •  atorvastatin (LIPITOR) 20 MG tablet, Take 20 mg by mouth Daily., Disp: , Rfl:   •  BIOTIN PO, Take 10,000 mcg by mouth Daily., Disp: , Rfl:   •  calcium carbonate (OS-YUNIER) 600 MG tablet, Take 1,200 mg by mouth Daily., Disp: , Rfl:   •  calcium carbonate (TUMS) 500 MG chewable tablet, Chew 1 tablet As Needed for Indigestion or Heartburn., Disp: , Rfl:   •  DHA-EPA-Vitamin E (OMEGA-3 COMPLEX PO), Take 1 capsule by mouth Daily., Disp: , Rfl:   •  Garlic (GARLIQUE PO), Take 1 tablet by mouth Daily., Disp: , Rfl:   •  levothyroxine (SYNTHROID, LEVOTHROID) 88 MCG tablet, Take 88 mcg by mouth Daily., Disp: , Rfl:   •  Magnesium 250 MG tablet, Take 250 mg by mouth Every Other Day., Disp: , Rfl:   •  Melatonin 10 MG capsule, Take 10 mg by mouth Every Night. Takes 1-2 nightly, Disp: , Rfl:   •  metoprolol tartrate (LOPRESSOR) 25 MG tablet, Take 1 tablet by mouth 2 (Two) Times a Day., Disp: 180 tablet, Rfl: 3  •  NON FORMULARY, Take 1 dose by mouth Daily. ALPHA CRS, CELLULAR VITALITY COMPLEX, Disp: , Rfl:   •  NON FORMULARY, Take 1 dose by mouth Daily. MICRO PLEX VMZ, FOOD NUTRIENT COMPLEX DIETARY SUPP, Disp: , Rfl:     Allergies:   No Known Allergies    Objective     Physical Exam:  Vital Signs:   Vitals:    06/15/20 0817   BP: 122/74   BP Location: Right arm   Patient Position: Sitting   Pulse: 95   Temp: 96.2 °F (35.7 °C)   SpO2: 99%   Weight: 64.3 kg (141 lb 12.8 oz)   Height: 157.5 cm (62\")     GEN: Well nourished, well-developed, no acute distress  HEENT: Normocephalic, atraumatic, moist mucous membranes  NECK: Supple, no JVD, no thyromegaly, no lymphadenopathy  CARD: Regular rate and rhythm, normal S1 & S2 are present.  No murmur, gallop or rubs are " appreciated.  LUNGS: Clear to auscultation bilateraly, normal respiratory effort  ABDOMEN: Soft, nontender, normal bowel sounds  EXTREMITIES: No gross deformities, no clubbing, cyanosis.  Edema   SKIN: Warm, dry  NEURO: No focal deficits, alert and oriented x 3  PSYCHIATRIC: Normal affect and mood, appropriate use of semantics and logic.        Lab Results   Component Value Date    GLUCOSE 124 (H) 05/07/2020    CALCIUM 9.5 05/07/2020     05/07/2020    K 4.3 05/07/2020    CO2 22.0 05/07/2020     05/07/2020    BUN 19 05/07/2020    CREATININE 0.91 05/07/2020    EGFRIFNONA 59 (L) 05/07/2020    BCR 20.9 05/07/2020    ANIONGAP 15.0 05/07/2020     Lab Results   Component Value Date    WBC 6.31 05/07/2020    HGB 14.9 05/07/2020    HCT 44.8 05/07/2020    MCV 96.8 05/07/2020     05/07/2020       Cardiac Testing:     I personally viewed and interpreted the patient's EKG/Telemetry/lab data    Device interrogation: [employer] dual-chamber pacemaker at DDD IR 70 p.m., RA paced 22%, RV paced 70%, acceptable lead threshold and impedance, battery life with 7.5 years remaining, 85% mode switch with average rate 70 to 90 bpm.    Assessment & Plan        Carli was seen today for slow heart rate and paf.    Diagnoses and all orders for this visit:    Paroxysmal atrial fibrillation (CMS/HCC)    Sick sinus syndrome (CMS/HCC)    Essential hypertension    Cardiac pacemaker in situ        Plan: Ms. Carver presents today with her daughter for recommendations on treatment for her symptomatic atrial fibrillation/flutter.  She is noted to fail amiodarone therapy in the past with multiple electrocardioversions.  Options for continued treatment with alternate medications versus an ablation procedure were outlined and reviewed in detail in office today.  Ultimately patient and daughter wishes to go home and discuss potential AV node ablation and treatment for her arrhythmia.  Patient encouraged to continue Eliquis and Lopressor  therapy as prescribed and contact office with decision to pursue procedure if desired.    Follow Up: As needed.      Scribed for Eric Almodovar DO by Dain Ospina, LYUDMILA. 6/15/2020  09:22        Thank you for allowing me to participate in the care of your patient. Please to not hesitate to contact me with additional questions or concerns.        Eric Almodovar DO, CHRISTINA, [facility]  Cardiac Electrophysiologist    Eric Almodovar DO, CHRISTINA, [facility]  Cardiac Electrophysiologist  [facility]

## 2020-06-22 ENCOUNTER — TELEPHONE (OUTPATIENT)
Dept: CARDIOLOGY | Facility: CLINIC | Age: 85
End: 2020-06-22

## 2020-06-22 DIAGNOSIS — I48.0 PAROXYSMAL ATRIAL FIBRILLATION (HCC): Primary | ICD-10-CM

## 2020-06-22 DIAGNOSIS — I49.5 SICK SINUS SYNDROME (HCC): ICD-10-CM

## 2020-06-22 NOTE — TELEPHONE ENCOUNTER
Carlotta the daughter called with questions regarding AVNA and questions were answered and they would like to proceed with procedure.

## 2020-06-30 ENCOUNTER — PREP FOR SURGERY (OUTPATIENT)
Dept: OTHER | Facility: HOSPITAL | Age: 85
End: 2020-06-30

## 2020-06-30 DIAGNOSIS — I49.5 SICK SINUS SYNDROME (HCC): Primary | ICD-10-CM

## 2020-06-30 RX ORDER — SODIUM CHLORIDE 0.9 % (FLUSH) 0.9 %
10 SYRINGE (ML) INJECTION AS NEEDED
Status: CANCELLED | OUTPATIENT
Start: 2020-06-30

## 2020-06-30 RX ORDER — SODIUM CHLORIDE 9 MG/ML
1 INJECTION, SOLUTION INTRAVENOUS CONTINUOUS
Status: CANCELLED | OUTPATIENT
Start: 2020-06-30 | End: 2020-06-30

## 2020-06-30 RX ORDER — ACETAMINOPHEN 325 MG/1
650 TABLET ORAL EVERY 4 HOURS PRN
Status: CANCELLED | OUTPATIENT
Start: 2020-06-30

## 2020-06-30 RX ORDER — ONDANSETRON 2 MG/ML
4 INJECTION INTRAMUSCULAR; INTRAVENOUS EVERY 6 HOURS PRN
Status: CANCELLED | OUTPATIENT
Start: 2020-06-30

## 2020-06-30 RX ORDER — SODIUM CHLORIDE 0.9 % (FLUSH) 0.9 %
3 SYRINGE (ML) INJECTION EVERY 12 HOURS SCHEDULED
Status: CANCELLED | OUTPATIENT
Start: 2020-06-30

## 2020-06-30 RX ORDER — NITROGLYCERIN 0.4 MG/1
0.4 TABLET SUBLINGUAL
Status: CANCELLED | OUTPATIENT
Start: 2020-06-30

## 2020-07-06 RX ORDER — AMIODARONE HYDROCHLORIDE 200 MG/1
TABLET ORAL
Qty: 30 TABLET | Refills: 4 | OUTPATIENT
Start: 2020-07-06

## 2020-07-12 ENCOUNTER — APPOINTMENT (OUTPATIENT)
Dept: PREADMISSION TESTING | Facility: HOSPITAL | Age: 85
End: 2020-07-12

## 2020-07-12 DIAGNOSIS — I49.5 SICK SINUS SYNDROME (HCC): ICD-10-CM

## 2020-07-12 LAB
ANION GAP SERPL CALCULATED.3IONS-SCNC: 11 MMOL/L (ref 5–15)
BUN SERPL-MCNC: 18 MG/DL (ref 8–23)
BUN/CREAT SERPL: 20.9 (ref 7–25)
CA-I SERPL ISE-MCNC: 1.32 MMOL/L (ref 1.12–1.32)
CALCIUM SPEC-SCNC: 9.6 MG/DL (ref 8.6–10.5)
CHLORIDE SERPL-SCNC: 105 MMOL/L (ref 98–107)
CO2 SERPL-SCNC: 27 MMOL/L (ref 22–29)
CREAT SERPL-MCNC: 0.86 MG/DL (ref 0.57–1)
DEPRECATED RDW RBC AUTO: 43.5 FL (ref 37–54)
ERYTHROCYTE [DISTWIDTH] IN BLOOD BY AUTOMATED COUNT: 12.3 % (ref 12.3–15.4)
GFR SERPL CREATININE-BSD FRML MDRD: 63 ML/MIN/1.73
GLUCOSE SERPL-MCNC: 95 MG/DL (ref 65–99)
HCT VFR BLD AUTO: 43.2 % (ref 34–46.6)
HGB BLD-MCNC: 14.6 G/DL (ref 12–15.9)
MAGNESIUM SERPL-MCNC: 2.2 MG/DL (ref 1.6–2.4)
MCH RBC QN AUTO: 32.5 PG (ref 26.6–33)
MCHC RBC AUTO-ENTMCNC: 33.8 G/DL (ref 31.5–35.7)
MCV RBC AUTO: 96.2 FL (ref 79–97)
PLATELET # BLD AUTO: 208 10*3/MM3 (ref 140–450)
PMV BLD AUTO: 9.5 FL (ref 6–12)
POTASSIUM SERPL-SCNC: 4.2 MMOL/L (ref 3.5–5.2)
RBC # BLD AUTO: 4.49 10*6/MM3 (ref 3.77–5.28)
SODIUM SERPL-SCNC: 143 MMOL/L (ref 136–145)
WBC # BLD AUTO: 5.61 10*3/MM3 (ref 3.4–10.8)

## 2020-07-12 PROCEDURE — U0004 COV-19 TEST NON-CDC HGH THRU: HCPCS

## 2020-07-12 PROCEDURE — 80048 BASIC METABOLIC PNL TOTAL CA: CPT | Performed by: NURSE PRACTITIONER

## 2020-07-12 PROCEDURE — U0002 COVID-19 LAB TEST NON-CDC: HCPCS

## 2020-07-12 PROCEDURE — 36415 COLL VENOUS BLD VENIPUNCTURE: CPT

## 2020-07-12 PROCEDURE — 85027 COMPLETE CBC AUTOMATED: CPT | Performed by: NURSE PRACTITIONER

## 2020-07-12 PROCEDURE — 83735 ASSAY OF MAGNESIUM: CPT | Performed by: PHYSICIAN ASSISTANT

## 2020-07-12 PROCEDURE — C9803 HOPD COVID-19 SPEC COLLECT: HCPCS

## 2020-07-12 PROCEDURE — 82330 ASSAY OF CALCIUM: CPT | Performed by: PHYSICIAN ASSISTANT

## 2020-07-12 RX ORDER — DOCUSATE SODIUM 100 MG/1
100 CAPSULE, LIQUID FILLED ORAL DAILY PRN
COMMUNITY

## 2020-07-12 NOTE — DISCHARGE INSTRUCTIONS
"Dear Patient,    Do NOT eat, drink, or smoke after midnight the night before your procedure.   Take your medications as instructed by your doctor.    Glasses and jewelry may be worn, but dentures must be removed prior to your procedure.    Leave any items you consider valuable at home.      MORNING of your Procedure, please bring the following:     -Photo ID and insurance card(s)    -ALL medications in their ORIGINAL CONTAINERS    -Co-pay and/or deductible required by your insurance   -Copy of living will or power of  document (if not brought to    Pre-Admission Testing department)   -CPAP mask and tubing, not your machine (if applicable)    -Relaxation aids (music, books, magazines)   -Skin Prep Instruction Sheet (if applicable)   -Relaxation Aids    Check in on the 2nd floor in the [facility].  Your procedure will be performed in the cath lab or EP lab.  During your procedure, your family will wait in the cath lab waiting area where you checked in.      Need to make arrangements for transportation prior to discharge.    A handout regarding \"Heart Healthy Eating\" was provided today to encourage healthy eating habits.    Booklet published by [employer] was given in Pre-Admission testing.  This booklet is for informational purposes only.  If you have any questions about your procedure, please speak with your physician.      Please note:  If you are scheduled to have one of the following procedures: Pulmonary Vein Ablation, Lead Extraction, MitraClip, Cerebral Coilings or Embolization, please let your family know that after your procedure you will be going to recovery unit on the 2nd floor of the [facility].  When the physician is finished speaking with your family after your procedure is completed, your family will be directed or escorted to the surgery waiting area in the [facility].  This is where your family will wait until you are given a room assignment and then your family will be directed to the " appropriate unit.

## 2020-07-12 NOTE — PAT
COVID TEST PERFORMED TODAY PRIOR TO PAT APPT.     LAST PACEMAKER CHECK 6/15/2020.    PATIENT WILL CALL DR HAMM'S OFFICE FIRST THING IN MORNING TO SEE IF PATIENT IS TO CONTINUE TAKING ELIQUIS.

## 2020-07-13 LAB
REF LAB TEST METHOD: NORMAL
SARS-COV-2 RNA RESP QL NAA+PROBE: NOT DETECTED

## 2020-07-15 ENCOUNTER — HOSPITAL ENCOUNTER (OUTPATIENT)
Facility: HOSPITAL | Age: 85
Discharge: HOME | End: 2020-07-16
Attending: INTERNAL MEDICINE | Admitting: INTERNAL MEDICINE

## 2020-07-15 DIAGNOSIS — I49.5 SICK SINUS SYNDROME (HCC): ICD-10-CM

## 2020-07-15 DIAGNOSIS — I48.0 PAROXYSMAL ATRIAL FIBRILLATION (HCC): ICD-10-CM

## 2020-07-15 PROCEDURE — 93650 ICAR CATH ABLTJ AV NODE FUNC: CPT | Performed by: INTERNAL MEDICINE

## 2020-07-15 PROCEDURE — C2630 CATH, EP, COOL-TIP: HCPCS | Performed by: INTERNAL MEDICINE

## 2020-07-15 PROCEDURE — 25010000003 LIDOCAINE 1 % SOLUTION: Performed by: INTERNAL MEDICINE

## 2020-07-15 PROCEDURE — 63710000001 APIXABAN 5 MG TABLET: Performed by: INTERNAL MEDICINE

## 2020-07-15 PROCEDURE — 25010000002 KETOROLAC TROMETHAMINE PER 15 MG: Performed by: INTERNAL MEDICINE

## 2020-07-15 PROCEDURE — 99153 MOD SED SAME PHYS/QHP EA: CPT | Performed by: INTERNAL MEDICINE

## 2020-07-15 PROCEDURE — C1732 CATH, EP, DIAG/ABL, 3D/VECT: HCPCS | Performed by: INTERNAL MEDICINE

## 2020-07-15 PROCEDURE — 99152 MOD SED SAME PHYS/QHP 5/>YRS: CPT | Performed by: INTERNAL MEDICINE

## 2020-07-15 PROCEDURE — C1893 INTRO/SHEATH, FIXED,NON-PEEL: HCPCS | Performed by: INTERNAL MEDICINE

## 2020-07-15 PROCEDURE — 93600 BUNDLE OF HIS RECORDING: CPT | Performed by: INTERNAL MEDICINE

## 2020-07-15 PROCEDURE — 25010000002 FENTANYL CITRATE (PF) 100 MCG/2ML SOLUTION: Performed by: INTERNAL MEDICINE

## 2020-07-15 PROCEDURE — 25010000002 MIDAZOLAM PER 1 MG: Performed by: INTERNAL MEDICINE

## 2020-07-15 PROCEDURE — A9270 NON-COVERED ITEM OR SERVICE: HCPCS | Performed by: INTERNAL MEDICINE

## 2020-07-15 RX ORDER — SODIUM CHLORIDE 9 MG/ML
INJECTION, SOLUTION INTRAVENOUS CONTINUOUS PRN
Status: DISCONTINUED | OUTPATIENT
Start: 2020-07-15 | End: 2020-07-15 | Stop reason: HOSPADM

## 2020-07-15 RX ORDER — SODIUM CHLORIDE 9 MG/ML
1 INJECTION, SOLUTION INTRAVENOUS CONTINUOUS
Status: ACTIVE | OUTPATIENT
Start: 2020-07-15 | End: 2020-07-15

## 2020-07-15 RX ORDER — SODIUM CHLORIDE 0.9 % (FLUSH) 0.9 %
3 SYRINGE (ML) INJECTION EVERY 12 HOURS SCHEDULED
Status: DISCONTINUED | OUTPATIENT
Start: 2020-07-15 | End: 2020-07-15 | Stop reason: HOSPADM

## 2020-07-15 RX ORDER — ONDANSETRON 2 MG/ML
4 INJECTION INTRAMUSCULAR; INTRAVENOUS EVERY 6 HOURS PRN
Status: DISCONTINUED | OUTPATIENT
Start: 2020-07-15 | End: 2020-07-15 | Stop reason: HOSPADM

## 2020-07-15 RX ORDER — SODIUM CHLORIDE 0.9 % (FLUSH) 0.9 %
10 SYRINGE (ML) INJECTION AS NEEDED
Status: DISCONTINUED | OUTPATIENT
Start: 2020-07-15 | End: 2020-07-15 | Stop reason: HOSPADM

## 2020-07-15 RX ORDER — FENTANYL CITRATE 50 UG/ML
INJECTION, SOLUTION INTRAMUSCULAR; INTRAVENOUS AS NEEDED
Status: DISCONTINUED | OUTPATIENT
Start: 2020-07-15 | End: 2020-07-15 | Stop reason: HOSPADM

## 2020-07-15 RX ORDER — BUPIVACAINE HYDROCHLORIDE 5 MG/ML
INJECTION, SOLUTION PERINEURAL AS NEEDED
Status: DISCONTINUED | OUTPATIENT
Start: 2020-07-15 | End: 2020-07-15 | Stop reason: HOSPADM

## 2020-07-15 RX ORDER — ACETAMINOPHEN 325 MG/1
650 TABLET ORAL EVERY 4 HOURS PRN
Status: DISCONTINUED | OUTPATIENT
Start: 2020-07-15 | End: 2020-07-15 | Stop reason: HOSPADM

## 2020-07-15 RX ORDER — LIDOCAINE HYDROCHLORIDE 10 MG/ML
INJECTION, SOLUTION INFILTRATION; PERINEURAL AS NEEDED
Status: DISCONTINUED | OUTPATIENT
Start: 2020-07-15 | End: 2020-07-15 | Stop reason: HOSPADM

## 2020-07-15 RX ORDER — NITROGLYCERIN 0.4 MG/1
0.4 TABLET SUBLINGUAL
Status: DISCONTINUED | OUTPATIENT
Start: 2020-07-15 | End: 2020-07-15 | Stop reason: HOSPADM

## 2020-07-15 RX ORDER — MIDAZOLAM HYDROCHLORIDE 1 MG/ML
INJECTION INTRAMUSCULAR; INTRAVENOUS AS NEEDED
Status: DISCONTINUED | OUTPATIENT
Start: 2020-07-15 | End: 2020-07-15 | Stop reason: HOSPADM

## 2020-07-15 RX ORDER — ONDANSETRON 2 MG/ML
4 INJECTION INTRAMUSCULAR; INTRAVENOUS EVERY 6 HOURS PRN
Status: DISCONTINUED | OUTPATIENT
Start: 2020-07-15 | End: 2020-07-16 | Stop reason: HOSPADM

## 2020-07-15 RX ORDER — KETOROLAC TROMETHAMINE 15 MG/ML
15 INJECTION, SOLUTION INTRAMUSCULAR; INTRAVENOUS EVERY 8 HOURS
Status: DISCONTINUED | OUTPATIENT
Start: 2020-07-15 | End: 2020-07-16 | Stop reason: HOSPADM

## 2020-07-15 RX ORDER — LEVOTHYROXINE SODIUM 88 UG/1
88 TABLET ORAL DAILY
Status: DISCONTINUED | OUTPATIENT
Start: 2020-07-15 | End: 2020-07-16 | Stop reason: HOSPADM

## 2020-07-15 RX ADMIN — KETOROLAC TROMETHAMINE 15 MG: 15 INJECTION, SOLUTION INTRAMUSCULAR; INTRAVENOUS at 17:19

## 2020-07-15 RX ADMIN — SODIUM CHLORIDE 1 ML/KG/HR: 9 INJECTION, SOLUTION INTRAVENOUS at 09:21

## 2020-07-15 RX ADMIN — APIXABAN 5 MG: 5 TABLET, FILM COATED ORAL at 22:08

## 2020-07-15 RX ADMIN — KETOROLAC TROMETHAMINE 15 MG: 15 INJECTION, SOLUTION INTRAMUSCULAR; INTRAVENOUS at 22:08

## 2020-07-15 NOTE — PLAN OF CARE
VSS, V-paced on monitor, remains on RA. Right groin site without complications. Patient denies pain. Will continue to monitor.

## 2020-07-15 NOTE — INTERVAL H&P NOTE
CC:  Persistent Atrial FIbrillation    HPI Update:  Mrs Carver presents today for elective AV node ablation status post permanent pacemaker implant in the past for sick sinus syndrome.  She was recently seen in EP follow-up on 6/15/2020 with options for continued treatment for her atrial fib/flutter outlined and reviewed in detail to include alternate medication therapy versus an ablation procedure with indwelling permanent pacemaker.  Ultimately patient wished to pursue AV node ablation and treatment for her arrhythmia to avoid long-term medication therapy.  Education reinforced that patient will need long-term anticoagulation therapy with continued Eliquis for stroke prevention despite procedure today.  Patient verbalized complete understanding.  Patient admits to tolerating anticoagulation without side effects or signs of bleeding.  Patient denies recent infections or signs of fever, chills, sweats, or cough.  Patient denies recent sick contacts.  Patient has a negative COVID screening documented within the last 72 hours.  All preoperative lab evaluation reviewed and acceptable.    Constitutional: No fevers or chills, no recent weight gain or weight loss, + fatigue  Eyes: No visual loss, blurred vision, double vision, yellow sclerae.  ENT: No headaches, hearing loss, vertigo, congestion or sore throat.   Cardiovascular: Per HPI  Respiratory: No cough or wheezing, no sputum production, no hematemesis, + soa, greer   Gastrointestinal: No abdominal pain, no nausea, vomiting, constipation, diarrhea, melena.   Genitourinary: No dysuria, hematuria or increased frequency.  Musculoskeletal:  No gait disturbance, weakness or joint pain or stiffness  Integumentary: No rashes, urticaria, ulcers or sores.   Neurological: No headache, dizziness, syncope, paralysis, ataxia, no prior CVA/TIA  Psychiatric: No anxiety, or depression  Endocrine: No diaphoresis, cold or heat intolerance. No polyuria or polydipsia.  "  Hematologic/Lymphatic: No anemia, abnormal bruising or bleeding. No history of DVT/PE.    /82 (BP Location: Left arm, Patient Position: Lying)   Pulse 68   Temp 97.1 °F (36.2 °C) (Tympanic)   Resp 16   Ht 157.5 cm (62\")   Wt 64.2 kg (141 lb 8.6 oz)   SpO2 100%   BMI 25.89 kg/m²     Telemetry:  AFib 70 bpm    Lab Results   Component Value Date    WBC 5.61 07/12/2020    HGB 14.6 07/12/2020    HCT 43.2 07/12/2020    MCV 96.2 07/12/2020     07/12/2020     Lab Results   Component Value Date    GLUCOSE 95 07/12/2020    CALCIUM 9.6 07/12/2020     07/12/2020    K 4.2 07/12/2020    CO2 27.0 07/12/2020     07/12/2020    BUN 18 07/12/2020    CREATININE 0.86 07/12/2020    EGFRIFNONA 63 07/12/2020    BCR 20.9 07/12/2020    ANIONGAP 11.0 07/12/2020     GEN: Well nourished, well-developed, no acute distress  HEENT: Normocephalic, atraumatic, moist mucous membranes  NECK: Supple, no JVD, no thyromegaly, no lymphadenopathy  CARD: Irregular rate and rhythm, normal S1 & S2 are present.  No murmur, gallop or rubs are appreciated.  LUNGS: Clear to auscultation bilateraly, normal respiratory effort  ABDOMEN: Soft, nontender, normal bowel sounds  EXTREMITIES: No gross deformities, no clubbing, cyanosis.  No Edema   SKIN: Warm, dry  NEURO: No focal deficits, alert and oriented x 3  PSYCHIATRIC: Normal affect and mood, appropriate use of semantics and logic.    Electronically signed by LYUDMILA Goss, 07/15/20, 9:58 AM.            "

## 2020-07-16 VITALS
SYSTOLIC BLOOD PRESSURE: 143 MMHG | TEMPERATURE: 97.3 F | HEIGHT: 62 IN | OXYGEN SATURATION: 97 % | BODY MASS INDEX: 26.05 KG/M2 | HEART RATE: 82 BPM | WEIGHT: 141.54 LBS | DIASTOLIC BLOOD PRESSURE: 88 MMHG | RESPIRATION RATE: 20 BRPM

## 2020-07-16 PROCEDURE — 93010 ELECTROCARDIOGRAM REPORT: CPT | Performed by: INTERNAL MEDICINE

## 2020-07-16 PROCEDURE — 99213 OFFICE O/P EST LOW 20 MIN: CPT | Performed by: NURSE PRACTITIONER

## 2020-07-16 PROCEDURE — 63710000001 LEVOTHYROXINE 88 MCG TABLET: Performed by: INTERNAL MEDICINE

## 2020-07-16 PROCEDURE — A9270 NON-COVERED ITEM OR SERVICE: HCPCS | Performed by: INTERNAL MEDICINE

## 2020-07-16 PROCEDURE — 25010000002 KETOROLAC TROMETHAMINE PER 15 MG: Performed by: INTERNAL MEDICINE

## 2020-07-16 PROCEDURE — 63710000001 APIXABAN 5 MG TABLET: Performed by: INTERNAL MEDICINE

## 2020-07-16 PROCEDURE — 93005 ELECTROCARDIOGRAM TRACING: CPT | Performed by: INTERNAL MEDICINE

## 2020-07-16 RX ADMIN — APIXABAN 5 MG: 5 TABLET, FILM COATED ORAL at 08:19

## 2020-07-16 RX ADMIN — LEVOTHYROXINE SODIUM 88 MCG: 88 TABLET ORAL at 08:19

## 2020-07-16 RX ADMIN — KETOROLAC TROMETHAMINE 15 MG: 15 INJECTION, SOLUTION INTRAMUSCULAR; INTRAVENOUS at 08:19

## 2020-07-16 NOTE — PROGRESS NOTES
"      Cardiac Electrophysiology Inpatient Follow Up Note         [facility]    Progress Note      CC: Persistent atrial fibrillation    Subjective     Ms. Carver is seen in EP follow-up today status post AV node ablation with indwelling permanent pacemaker yesterday in treatment for her multidrug refractory symptomatic persistent atrial fibrillation.  Patient has rested well overnight without complaints this morning.  Her right groin suture is removed without hematoma, ecchymosis, or drainage noted.  Patient does report tachycardia while at rest and while getting up to go to the restroom overnight up to 120 bpm.    REVIEW OF SYSTEMS  ROS no change from admission H&P except for: above    Objective   VITAL SIGNS  /77 (BP Location: Left arm, Patient Position: Lying)   Pulse 92   Temp 97.2 °F (36.2 °C) (Oral)   Resp 14   Ht 157.5 cm (62\")   Wt 64.2 kg (141 lb 8.6 oz)   SpO2 96%   BMI 25.89 kg/m²     Pulse Ox: SpO2  Av.5 %  Min: 92 %  Max: 100 %  Supplemental O2:       Admit Weight  Weight: 64.2 kg (141 lb 8.6 oz)  Last 3 Weights    Body mass index is 25.89 kg/m².    INTAKE/OUTPUT  I/O last 3 completed shifts:  In: 500 [I.V.:500]  Out: -     Intake/Output Summary (Last 24 hours) at 2020 0820  Last data filed at 7/15/2020 1435  Gross per 24 hour   Intake 500 ml   Output --   Net 500 ml       PHYSICAL EXAM  General appearance: awake, alert, oriented, moves all extremities  Lungs: no rhonchi, no wheezes, no rales  Heart: S1S2 RRR  Abdomen: positive bowel sounds, no bruits, no masses  Extremities: warm and dry, no cyanosis, no clubbing    LABS  Results from last 7 days   Lab Units 20  1509   WBC 10*3/mm3 5.61   HEMOGLOBIN g/dL 14.6   HEMATOCRIT % 43.2   MCV fL 96.2   PLATELETS 10*3/mm3 208         Results from last 7 days   Lab Units 20  1509   POTASSIUM mmol/L 4.2   CHLORIDE mmol/L 105   CO2 mmol/L 27.0   BUN mg/dL 18   CREATININE mg/dL 0.86   GLUCOSE " mg/dL 95   CALCIUM mg/dL 9.6   MAGNESIUM mg/dL 2.2             Results from last 7 days   Lab Units 07/12/20  1509   MAGNESIUM mg/dL 2.2       CURRENT MEDICATIONS    apixaban 5 mg Oral BID   ketorolac 15 mg Intravenous Q8H   levothyroxine 88 mcg Oral Daily       TELEMETRY: Paced  bpm    Assessment & Plan     Ms. Carver is seen in EP follow-up today status post AV node ablation yesterday in treatment for her multidrug refractory symptomatic persistent atrial fibrillation with chronic indwelling permanent pacemaker.  Patient is noted to have tachycardia at rest and with activity overnight.  [employer] device representative contacted to interrogate pacemaker prior to scheduled discharge to assess patient's underlying heart rhythm.  Will make recommendations for discharge status post device interrogation.  No new orders at this time.  We will continue to monitor.      Electronically signed by LYUDMILA Goss, 07/16/20, 8:20 AM.

## 2020-07-22 NOTE — PROGRESS NOTES
[facility]  [facility]      07/23/2020         Carli Carver  [address] [place] [address]  [unfilled]    1935    Herminia Hernandez PA    Carli Carver is a 85 y.o. female.      Subjective:     Chief Complaint:  Atrial Fibrillation and Establish Care       HPI   85-year-old female with history of persistent atrial fibrillation, sick sinus syndrome status post pacemaker in 2014, hypertension, hyperlipidemia and hypothyroidism who presents today as a hospital follow-up after AV node ablation.  Patient underwent AV node ablation on 7/15.  Some elevated heart rates post procedure.  She thinks that she is feeling slightly better but still having some fatigue.  Prior to A. fib she was very active but has not been able to exercise since then.  She reports that she checks her pulse ox frequently during the day and has noticed some heart rates in the 100s when just sitting there.  She does not necessarily feel palpitations or feel bad at the time. No groin complications    Patient Active Problem List   Diagnosis   • Bradycardia   • Paroxysmal atrial fibrillation (CMS/HCC)   • Sick sinus syndrome (CMS/HCC)   • Essential hypertension   • Cardiac pacemaker in situ       Past Medical History:   Diagnosis Date   • A-fib (CMS/HCC)    • Atrial flutter (CMS/HCC)    • Bradycardia    • Cataract    • Constipation    • Heartburn    • Hyperlipidemia    • Hypothyroid    • Wears glasses        Past Surgical History:   Procedure Laterality Date   • ABLATION OF DYSRHYTHMIC FOCUS     • CARDIAC ELECTROPHYSIOLOGY PROCEDURE N/A 7/15/2020    Procedure: AV node ablation- pt has SJM pacemaker;  Surgeon: Eric Almodovar DO;  Location: [facility] INVASIVE LOCATION;  Service: Cardiovascular;  Laterality: N/A;   • CARDIOVERSION     • CATARACT EXTRACTION Right    • COLONOSCOPY     • INSERT / REPLACE / REMOVE PACEMAKER  08/29/2014    [employer], Location: [facility]   • TUBAL ABDOMINAL LIGATION          Family History   Problem Relation Age of Onset   • Lung cancer Mother    • Heart disease Brother        Social History     Socioeconomic History   • Marital status:      Spouse name: Not on file   • Number of children: Not on file   • Years of education: Not on file   • Highest education level: Not on file   Tobacco Use   • Smoking status: Never Smoker   • Smokeless tobacco: Never Used   Substance and Sexual Activity   • Alcohol use: No   • Drug use: No   • Sexual activity: Defer   Social History Narrative    Caffeine: None       No Known Allergies      Current Outpatient Medications:   •  apixaban (Eliquis) 5 MG tablet tablet, Take 1 tablet by mouth 2 (Two) Times a Day., Disp: 60 tablet, Rfl: 11  •  atorvastatin (LIPITOR) 20 MG tablet, Take 20 mg by mouth Every Night., Disp: , Rfl:   •  BIOTIN PO, Take 10,000 mcg by mouth Daily., Disp: , Rfl:   •  calcium carbonate (TUMS) 500 MG chewable tablet, Chew 1 tablet As Needed for Indigestion or Heartburn., Disp: , Rfl:   •  Calcium-Magnesium-Vitamin D (CALCIUM 1200+D3 PO), Take 1 tablet by mouth Daily. 1000 units d3, Disp: , Rfl:   •  DHA-EPA-Vitamin E (OMEGA-3 COMPLEX PO), Take 1 capsule by mouth Daily., Disp: , Rfl:   •  docusate sodium (COLACE) 100 MG capsule, Take 100 mg by mouth Daily As Needed., Disp: , Rfl:   •  Garlic (GARLIQUE PO), Take 1 tablet by mouth Daily., Disp: , Rfl:   •  levothyroxine (SYNTHROID, LEVOTHROID) 88 MCG tablet, Take 88 mcg by mouth Daily., Disp: , Rfl:   •  Magnesium 250 MG tablet, Take 250 mg by mouth Every Other Day., Disp: , Rfl:   •  Melatonin 10 MG capsule, Take 10 mg by mouth At Night As Needed. Takes 1-2 nightly , Disp: , Rfl:   •  Multiple Vitamins-Minerals (MULTIVITAMIN ADULT PO), Take 1 tablet by mouth Daily., Disp: , Rfl:   •  NON FORMULARY, Take 1 dose by mouth Daily. ALPHA CRS, CELLULAR VITALITY COMPLEX, Disp: , Rfl:   •  NON FORMULARY, Take 1 dose by mouth Daily. MICRO PLEX VMZ, FOOD NUTRIENT COMPLEX DIETARY SUPP,  "Disp: , Rfl:     The following portions of the patient's history were reviewed today and updated as appropriate: allergies, current medications, past family history, past medical history, past social history, past surgical history and problem list     Review of Systems   Constitution: Positive for malaise/fatigue. Negative for chills and fever.   HENT: Negative.    Eyes: Negative.    Cardiovascular: Positive for dyspnea on exertion. Negative for chest pain, claudication, cyanosis, irregular heartbeat, leg swelling, near-syncope, orthopnea, palpitations, paroxysmal nocturnal dyspnea and syncope.   Respiratory: Negative for cough, shortness of breath and snoring.    Endocrine: Negative.    Hematologic/Lymphatic: Does not bruise/bleed easily.   Skin: Negative for poor wound healing.   Musculoskeletal: Negative.    Gastrointestinal: Negative for abdominal pain, heartburn, hematemesis, melena, nausea and vomiting.   Genitourinary: Negative.  Negative for hematuria.   Neurological: Negative.    Psychiatric/Behavioral: Negative.    Allergic/Immunologic: Negative.        Objective:     Vitals:    07/23/20 1332 07/23/20 1333 07/23/20 1334   BP: 146/71 137/73 145/77   BP Location: Left arm Left arm Right arm   Patient Position: Standing Sitting Sitting   Cuff Size: Adult Adult Adult   Pulse: 101 93 91   Resp:   18   Temp:   97.3 °F (36.3 °C)   TempSrc:   Temporal   SpO2: 98% 96% 98%   Weight:   64.5 kg (142 lb 4 oz)   Height:   157.5 cm (62\")       Body mass index is 26.02 kg/m².    Physical Exam   Constitutional: She is oriented to person, place, and time. She appears well-developed and well-nourished. No distress.   HENT:   Head: Normocephalic.   Eyes: Pupils are equal, round, and reactive to light. Conjunctivae are normal.   Neck: Neck supple. No JVD present. No thyromegaly present.   Cardiovascular: Normal rate, regular rhythm, normal heart sounds and intact distal pulses. Exam reveals no gallop and no friction rub.   No " murmur heard.  Pulmonary/Chest: Effort normal and breath sounds normal. No respiratory distress. She has no wheezes. She has no rales. She exhibits no tenderness.   Abdominal: Soft. Bowel sounds are normal.   Musculoskeletal: Normal range of motion. She exhibits no edema.   Neurological: She is alert and oriented to person, place, and time.   Skin: Skin is warm and dry.   Psychiatric: She has a normal mood and affect. Her behavior is normal. Thought content normal.   Vitals reviewed.      Lab and Diagnostic Review:  Results for orders placed or performed in visit on 07/12/20   Calcium, Ionized   Result Value Ref Range    Ionized Calcium 1.32 1.12 - 1.32 mmol/L   Magnesium   Result Value Ref Range    Magnesium 2.2 1.6 - 2.4 mg/dL   CBC (No diff)   Result Value Ref Range    WBC 5.61 3.40 - 10.80 10*3/mm3    RBC 4.49 3.77 - 5.28 10*6/mm3    Hemoglobin 14.6 12.0 - 15.9 g/dL    Hematocrit 43.2 34.0 - 46.6 %    MCV 96.2 79.0 - 97.0 fL    MCH 32.5 26.6 - 33.0 pg    MCHC 33.8 31.5 - 35.7 g/dL    RDW 12.3 12.3 - 15.4 %    RDW-SD 43.5 37.0 - 54.0 fl    MPV 9.5 6.0 - 12.0 fL    Platelets 208 140 - 450 10*3/mm3   Basic metabolic panel   Result Value Ref Range    Glucose 95 65 - 99 mg/dL    BUN 18 8 - 23 mg/dL    Creatinine 0.86 0.57 - 1.00 mg/dL    Sodium 143 136 - 145 mmol/L    Potassium 4.2 3.5 - 5.2 mmol/L    Chloride 105 98 - 107 mmol/L    CO2 27.0 22.0 - 29.0 mmol/L    Calcium 9.6 8.6 - 10.5 mg/dL    eGFR Non African Amer 63 >60 mL/min/1.73    BUN/Creatinine Ratio 20.9 7.0 - 25.0    Anion Gap 11.0 5.0 - 15.0 mmol/L   Device check 7/16/2028 paced less than 1%, V paced greater than 99%, mode switch greater than 99%, AT/AF burden 95%.  No high ventricular rates.    Assessment and Plan:   1. Atrial fibrillation, persistent (CMS/HCC)  S/p AVN ablation  I checked her resting HRs and they were in the 80s. She checks her HR multiple times a day and often places the pulse oximeter on quickly and I wonder if she is getting a good  reading. I have reassured her to only check when she is symptomatic and that she will likely feel better once she is able to build up her endurance again. I told her to call me with any persistent HRs 100 or greater or any persistent symptoms   Consider resuming metoprolol if symptoms persist. However, she reports having some low BPs on this  Continue eliquis    2. Sick sinus syndrome (CMS/HCC)  S/p PPM    3. Essential hypertension  Controlled off medications    Follow up with Dr. Almodovar in 4 weeks  FU in AF Clinic PRN        It has been a pleasure to participate in the care of this patient.  Patient was instructed to call the [facility] with any questions, concerns, or worsening symptoms.    *Please note that portions of this note were completed with a voice recognition program. Efforts were made to edit the dictations, but occasionally words are mistranscribed.

## 2020-07-23 ENCOUNTER — OFFICE VISIT (OUTPATIENT)
Dept: CARDIOLOGY | Facility: HOSPITAL | Age: 85
End: 2020-07-23

## 2020-07-23 VITALS
SYSTOLIC BLOOD PRESSURE: 145 MMHG | OXYGEN SATURATION: 98 % | HEIGHT: 62 IN | WEIGHT: 142.25 LBS | DIASTOLIC BLOOD PRESSURE: 77 MMHG | HEART RATE: 91 BPM | TEMPERATURE: 97.3 F | RESPIRATION RATE: 18 BRPM | BODY MASS INDEX: 26.18 KG/M2

## 2020-07-23 DIAGNOSIS — I48.19 ATRIAL FIBRILLATION, PERSISTENT (HCC): Primary | ICD-10-CM

## 2020-07-23 DIAGNOSIS — I49.5 SICK SINUS SYNDROME (HCC): ICD-10-CM

## 2020-07-23 DIAGNOSIS — I10 ESSENTIAL HYPERTENSION: ICD-10-CM

## 2020-07-23 PROCEDURE — 99214 OFFICE O/P EST MOD 30 MIN: CPT | Performed by: NURSE PRACTITIONER

## 2020-08-17 ENCOUNTER — OFFICE VISIT (OUTPATIENT)
Dept: CARDIOLOGY | Facility: CLINIC | Age: 85
End: 2020-08-17

## 2020-08-17 VITALS
WEIGHT: 141 LBS | HEIGHT: 62 IN | SYSTOLIC BLOOD PRESSURE: 102 MMHG | HEART RATE: 91 BPM | BODY MASS INDEX: 25.95 KG/M2 | OXYGEN SATURATION: 98 % | DIASTOLIC BLOOD PRESSURE: 72 MMHG

## 2020-08-17 DIAGNOSIS — I44.2 COMPLETE HEART BLOCK (HCC): ICD-10-CM

## 2020-08-17 DIAGNOSIS — Z95.0 CARDIAC PACEMAKER IN SITU: ICD-10-CM

## 2020-08-17 DIAGNOSIS — I48.19 ATRIAL FIBRILLATION, PERSISTENT (HCC): Primary | ICD-10-CM

## 2020-08-17 PROCEDURE — 93279 PRGRMG DEV EVAL PM/LDLS PM: CPT | Performed by: INTERNAL MEDICINE

## 2020-08-17 PROCEDURE — 99214 OFFICE O/P EST MOD 30 MIN: CPT | Performed by: INTERNAL MEDICINE

## 2020-08-17 NOTE — PROGRESS NOTES
Cardiac Electrophysiology Outpatient Follow Up Note            [facility]    Follow Up Office Visit      Carli Carver  [id number]  08/17/2020  [unfilled]  [unfilled]    Primary Care Physician: Herminia Hernandez PA    Referred By: No ref. provider found    Subjective     Chief Complaint:   Chief Complaint   Patient presents with   • Atrial Fibrillation     1. Persistent atrial fibrillation              A) CHADSVASc = 4, on Eliquis              B) Amiodarone 04/2020              C) S/p successful ECV, 4/23/2020              D) Echocardiogram 4/23/2020: EF 56-60%, mild concentric LVH, mild to moderate MR              E) Recurrent AFib 5/7/2020 with Amiodarone discontinued 5/20/2020      1.  Sick sinus syndrome              A)Holter 2014 Bradycardia              B)[employer] Pacemaker 2014     2. Chest Pain              A)Echo EF normal,  mild AI 2010              b)Negative MPS 2014/Moderate calcium score 2014-Medical therapy      2. HTN- controlled  3. HLD-Statin   4. Hypothyroidism- Chronic synthroid     History of Present Illness:   Mrs. Carli Carver is a 85 y.o. female who presents to my electrophysiology clinic for follow up of persistent A. fib status post AV node ablation.  She feels better since in July we ablated her AV node.  She is compliant with COVID-19 social distancing but this does mean also that she has not really been doing much in terms of activity exercise getting around.    She is not short of breath.  She has no chest pain shortness of breath nausea vomiting fevers or chills.  She has no anginal symptoms..      Review of Systems:   Constitutional: No fevers or chills, no recent weight gain or weight loss or fatigue  Eyes: No visual loss, blurred vision, double vision, yellow sclerae.  ENT: No headaches, hearing loss, vertigo, congestion or sore throat.   Cardiovascular: Per HPI  Respiratory: No cough or wheezing, no  sputum production, no hematemesis   Gastrointestinal: No abdominal pain, no nausea, vomiting, constipation, diarrhea, melena.   Genitourinary: No dysuria, hematuria or increased frequency.  Musculoskeletal:  No gait disturbance, weakness or joint pain or stiffness  Integumentary: No rashes, urticaria, ulcers or sores.   Neurological: No headache, dizziness, syncope, paralysis, ataxia, no prior CVA/TIA  Psychiatric: No anxiety, or depression  Endocrine: No diaphoresis, cold or heat intolerance. No polyuria or polydipsia.   Hematologic/Lymphatic: No anemia, abnormal bruising or bleeding. No history of DVT/PE.      Past Medical History:   Past Medical History:   Diagnosis Date   • A-fib (CMS/HCC)    • Atrial flutter (CMS/HCC)    • Bradycardia    • Cataract    • Constipation    • Heartburn    • Hyperlipidemia    • Hypothyroid    • Wears glasses        Past Surgical History:   Past Surgical History:   Procedure Laterality Date   • ABLATION OF DYSRHYTHMIC FOCUS     • CARDIAC ELECTROPHYSIOLOGY PROCEDURE N/A 7/15/2020    Procedure: AV node ablation- pt has SJ pacemaker;  Surgeon: Eric Almodovar DO;  Location: [facility] LOCATION;  Service: Cardiovascular;  Laterality: N/A;   • CARDIOVERSION     • CATARACT EXTRACTION Right    • COLONOSCOPY     • INSERT / REPLACE / REMOVE PACEMAKER  08/29/2014    [facility], Location: [facility]   • TUBAL ABDOMINAL LIGATION         Family History:   Family History   Problem Relation Age of Onset   • Lung cancer Mother    • Heart disease Brother        Social History:   Social History     Socioeconomic History   • Marital status:      Spouse name: Not on file   • Number of children: Not on file   • Years of education: Not on file   • Highest education level: Not on file   Tobacco Use   • Smoking status: Never Smoker   • Smokeless tobacco: Never Used   Substance and Sexual Activity   • Alcohol use: No   • Drug use: No   • Sexual activity: Defer   Social History  "Narrative    Caffeine: None       Medications:     Current Outpatient Medications:   •  apixaban (Eliquis) 5 MG tablet tablet, Take 1 tablet by mouth 2 (Two) Times a Day., Disp: 60 tablet, Rfl: 11  •  atorvastatin (LIPITOR) 20 MG tablet, Take 20 mg by mouth Every Night., Disp: , Rfl:   •  BIOTIN PO, Take 10,000 mcg by mouth Daily., Disp: , Rfl:   •  calcium carbonate (TUMS) 500 MG chewable tablet, Chew 1 tablet As Needed for Indigestion or Heartburn., Disp: , Rfl:   •  Calcium-Magnesium-Vitamin D (CALCIUM 1200+D3 PO), Take 1 tablet by mouth Daily. 1000 units d3, Disp: , Rfl:   •  DHA-EPA-Vitamin E (OMEGA-3 COMPLEX PO), Take 1 capsule by mouth Daily., Disp: , Rfl:   •  docusate sodium (COLACE) 100 MG capsule, Take 100 mg by mouth Daily As Needed., Disp: , Rfl:   •  levothyroxine (SYNTHROID, LEVOTHROID) 88 MCG tablet, Take 88 mcg by mouth Daily., Disp: , Rfl:   •  Magnesium 250 MG tablet, Take 250 mg by mouth Every Other Day., Disp: , Rfl:   •  Melatonin 10 MG capsule, Take 10 mg by mouth At Night As Needed. Takes 1-2 nightly , Disp: , Rfl:   •  Multiple Vitamins-Minerals (MULTIVITAMIN ADULT PO), Take 1 tablet by mouth Daily., Disp: , Rfl:   •  NON FORMULARY, Take 1 dose by mouth Daily. ALPHA CRS, CELLULAR VITALITY COMPLEX, Disp: , Rfl:   •  NON FORMULARY, Take 1 dose by mouth Daily. MICRO PLEX VMZ, FOOD NUTRIENT COMPLEX DIETARY SUPP, Disp: , Rfl:     Allergies:   No Known Allergies    Objective     Physical Exam:  Vital Signs:   Vitals:    08/17/20 0935   BP: 102/72   BP Location: Left arm   Patient Position: Sitting   Pulse: 91   SpO2: 98%   Weight: 64 kg (141 lb)   Height: 157.5 cm (62\")     GEN: Well nourished, well-developed, no acute distress  HEENT: Normocephalic, atraumatic, moist mucous membranes  NECK: Supple, no JVD, no thyromegaly, no lymphadenopathy  CARD: Regular rate and rhythm, normal S1 & S2 are present.  No murmur, gallop or rubs are appreciated.  LUNGS: Clear to auscultation bilateraly, normal " respiratory effort  ABDOMEN: Soft, nontender, normal bowel sounds  EXTREMITIES: No gross deformities, no clubbing, cyanosis.  Edema none  SKIN: Warm, dry  NEURO: No focal deficits, alert and oriented x 3  PSYCHIATRIC: Normal affect and mood, appropriate use of semantics and logic.        Lab Results   Component Value Date    GLUCOSE 95 07/12/2020    CALCIUM 9.6 07/12/2020     07/12/2020    K 4.2 07/12/2020    CO2 27.0 07/12/2020     07/12/2020    BUN 18 07/12/2020    CREATININE 0.86 07/12/2020    EGFRIFNONA 63 07/12/2020    BCR 20.9 07/12/2020    ANIONGAP 11.0 07/12/2020     Lab Results   Component Value Date    WBC 5.61 07/12/2020    HGB 14.6 07/12/2020    HCT 43.2 07/12/2020    MCV 96.2 07/12/2020     07/12/2020     No results found for: INR, PROTIME  No results found for: TSH, D6SHIUT, K5JXXZZ, THYROIDAB    Cardiac Testing:     I personally viewed and interpreted the patient's EKG/Telemetry/lab data    Procedures    Tobacco Cessation: N/A  Obstructive Sleep Apnea Screening: N/A    Assessment & Plan      85-year-old female patient with a history of longstanding persistent atrial fibrillation status post AV node ablation.  She is a [employer] dual-chamber permanent pacemaker chronically.  She is doing well.  We will decrease her lower rate limit of 60 pulses per minute today.  We will continue anticoagulation with apixaban for the primary prevention of stroke.    She will follow-up with us in 1 years time.    There are no diagnoses linked to this encounter.      Follow Up:       Thank you for allowing me to participate in the care of your patient. Please to not hesitate to contact me with additional questions or concerns.        Eric Almodovar, DO, FAC, [facility]  Cardiac Electrophysiologist

## 2020-10-19 ENCOUNTER — OFFICE VISIT (OUTPATIENT)
Dept: CARDIOLOGY | Facility: CLINIC | Age: 85
End: 2020-10-19

## 2020-10-19 VITALS
SYSTOLIC BLOOD PRESSURE: 124 MMHG | DIASTOLIC BLOOD PRESSURE: 70 MMHG | OXYGEN SATURATION: 98 % | HEIGHT: 62 IN | HEART RATE: 74 BPM | WEIGHT: 138.4 LBS | BODY MASS INDEX: 25.47 KG/M2 | TEMPERATURE: 97.3 F

## 2020-10-19 DIAGNOSIS — I48.19 ATRIAL FIBRILLATION, PERSISTENT (HCC): ICD-10-CM

## 2020-10-19 DIAGNOSIS — I49.5 SICK SINUS SYNDROME (HCC): ICD-10-CM

## 2020-10-19 DIAGNOSIS — Z95.0 CARDIAC PACEMAKER IN SITU: ICD-10-CM

## 2020-10-19 DIAGNOSIS — I44.2 COMPLETE HEART BLOCK (HCC): ICD-10-CM

## 2020-10-19 DIAGNOSIS — I10 ESSENTIAL HYPERTENSION: ICD-10-CM

## 2020-10-19 DIAGNOSIS — I48.0 PAROXYSMAL ATRIAL FIBRILLATION (HCC): ICD-10-CM

## 2020-10-19 PROCEDURE — 93279 PRGRMG DEV EVAL PM/LDLS PM: CPT | Performed by: INTERNAL MEDICINE

## 2020-10-19 PROCEDURE — 99214 OFFICE O/P EST MOD 30 MIN: CPT | Performed by: INTERNAL MEDICINE

## 2020-10-19 NOTE — PROGRESS NOTES
Cardiac Electrophysiology Outpatient Follow Up Note            [facility] at [facility]    Follow Up Office Visit      Carli Carver  [id number]  10/19/2020  [unfilled]  [unfilled]    Primary Care Physician: Herminia Hernandez PA    Referred By: No ref. provider found    Subjective     Chief Complaint:   Chief Complaint   Patient presents with   • Atrial Fibrillation       History of Present Illness:   Mrs. Carli Carver is a 85 y.o. female who presents to my electrophysiology clinic for follow up of no new changes.  She is doing well.  In the 2-month interval since the last seen her she is experiencing no difficulty.  No chest pain nausea vomit fevers or chills.  No syncope presyncope.    She is here with her daughter as usual..      Review of Systems:   Constitutional: No fevers or chills, no recent weight gain or weight loss or fatigue  Eyes: No visual loss, blurred vision, double vision, yellow sclerae.  ENT: No headaches, hearing loss, vertigo, congestion or sore throat.   Cardiovascular: Per HPI  Respiratory: No cough or wheezing, no sputum production, no hematemesis   Gastrointestinal: No abdominal pain, no nausea, vomiting, constipation, diarrhea, melena.   Genitourinary: No dysuria, hematuria or increased frequency.  Musculoskeletal:  No gait disturbance, weakness or joint pain or stiffness  Integumentary: No rashes, urticaria, ulcers or sores.   Neurological: No headache, dizziness, syncope, paralysis, ataxia, no prior CVA/TIA  Psychiatric: No anxiety, or depression  Endocrine: No diaphoresis, cold or heat intolerance. No polyuria or polydipsia.   Hematologic/Lymphatic: No anemia, abnormal bruising or bleeding. No history of DVT/PE.      Past Medical History:   Past Medical History:   Diagnosis Date   • A-fib (CMS/HCC)    • Atrial flutter (CMS/HCC)    • Bradycardia    • Cataract    • Constipation    • Heartburn    • Hyperlipidemia    •  Hypothyroid    • Wears glasses        Past Surgical History:   Past Surgical History:   Procedure Laterality Date   • ABLATION OF DYSRHYTHMIC FOCUS     • CARDIAC ELECTROPHYSIOLOGY PROCEDURE N/A 7/15/2020    Procedure: AV node ablation- pt has SJM pacemaker;  Surgeon: Eric Almodovar DO;  Location: [facility] INVASIVE LOCATION;  Service: Cardiovascular;  Laterality: N/A;   • CARDIOVERSION     • CATARACT EXTRACTION Right    • COLONOSCOPY     • INSERT / REPLACE / REMOVE PACEMAKER  08/29/2014    [facility], Location: [facility]   • TUBAL ABDOMINAL LIGATION         Family History:   Family History   Problem Relation Age of Onset   • Lung cancer Mother    • Heart disease Brother        Social History:   Social History     Socioeconomic History   • Marital status:      Spouse name: Not on file   • Number of children: Not on file   • Years of education: Not on file   • Highest education level: Not on file   Tobacco Use   • Smoking status: Never Smoker   • Smokeless tobacco: Never Used   Substance and Sexual Activity   • Alcohol use: No   • Drug use: No   • Sexual activity: Defer   Social History Narrative    Caffeine: None       Medications:     Current Outpatient Medications:   •  apixaban (Eliquis) 5 MG tablet tablet, Take 1 tablet by mouth 2 (Two) Times a Day., Disp: 60 tablet, Rfl: 11  •  atorvastatin (LIPITOR) 20 MG tablet, Take 20 mg by mouth Every Night., Disp: , Rfl:   •  BIOTIN PO, Take 10,000 mcg by mouth Daily., Disp: , Rfl:   •  calcium carbonate (TUMS) 500 MG chewable tablet, Chew 1 tablet As Needed for Indigestion or Heartburn., Disp: , Rfl:   •  Calcium-Magnesium-Vitamin D (CALCIUM 1200+D3 PO), Take 1 tablet by mouth Daily. 1000 units d3, Disp: , Rfl:   •  DHA-EPA-Vitamin E (OMEGA-3 COMPLEX PO), Take 1 capsule by mouth Daily., Disp: , Rfl:   •  docusate sodium (COLACE) 100 MG capsule, Take 100 mg by mouth Daily As Needed., Disp: , Rfl:   •  levothyroxine (SYNTHROID, LEVOTHROID) 88 MCG tablet,  "Take 88 mcg by mouth Daily., Disp: , Rfl:   •  Magnesium 250 MG tablet, Take 250 mg by mouth Every Other Day., Disp: , Rfl:   •  Melatonin 10 MG capsule, Take 10 mg by mouth At Night As Needed. Takes 1-2 nightly , Disp: , Rfl:   •  Multiple Vitamins-Minerals (MULTIVITAMIN ADULT PO), Take 1 tablet by mouth Daily., Disp: , Rfl:   •  NON FORMULARY, Take 1 dose by mouth Daily. ALPHA CRS, CELLULAR VITALITY COMPLEX, Disp: , Rfl:   •  NON FORMULARY, Take 1 dose by mouth Daily. MICRO PLEX VMZ, FOOD NUTRIENT COMPLEX DIETARY SUPP, Disp: , Rfl:     Allergies:   No Known Allergies    Objective     Physical Exam:  Vital Signs:   Vitals:    10/19/20 1337   BP: 124/70   BP Location: Left arm   Patient Position: Sitting   Pulse: 74   Temp: 97.3 °F (36.3 °C)   SpO2: 98%   Weight: 62.8 kg (138 lb 6.4 oz)   Height: 157.5 cm (62\")     GEN: Well nourished, well-developed, no acute distress  HEENT: Normocephalic, atraumatic, moist mucous membranes  NECK: Supple, no JVD, no thyromegaly, no lymphadenopathy  CARD: Regular rate and rhythm, normal S1 & S2 are present.  No murmur, gallop or rubs are appreciated.  LUNGS: Clear to auscultation bilateraly, normal respiratory effort  ABDOMEN: Soft, nontender, normal bowel sounds  EXTREMITIES: No gross deformities, no clubbing, cyanosis.  Edema none  SKIN: Warm, dry  NEURO: No focal deficits, alert and oriented x 3  PSYCHIATRIC: Normal affect and mood, appropriate use of semantics and logic.        Lab Results   Component Value Date    GLUCOSE 95 07/12/2020    CALCIUM 9.6 07/12/2020     07/12/2020    K 4.2 07/12/2020    CO2 27.0 07/12/2020     07/12/2020    BUN 18 07/12/2020    CREATININE 0.86 07/12/2020    EGFRIFNONA 63 07/12/2020    BCR 20.9 07/12/2020    ANIONGAP 11.0 07/12/2020     Lab Results   Component Value Date    WBC 5.61 07/12/2020    HGB 14.6 07/12/2020    HCT 43.2 07/12/2020    MCV 96.2 07/12/2020     07/12/2020     No results found for: INR, PROTIME  No results " found for: TSH, B6VKEXX, J8VNXIZ, THYROIDAB    Cardiac Testing:     I personally viewed and interpreted the patient's EKG/Telemetry/lab data    Procedures    Tobacco Cessation: N/A  Obstructive Sleep Apnea Screening: N/A    Assessment & Plan      85-year-old female patient status post AV node ablation.  In the 2-month interval since of last seen her she is doing well.  Her pacemaker is functioning beautifully.  She remains pacemaker dependent.  No abnormalities of significance are noted.  She is doing beautifully.  We will see her in 1 years time.    There are no diagnoses linked to this encounter.      Follow Up:       Thank you for allowing me to participate in the care of your patient. Please to not hesitate to contact me with additional questions or concerns.        Eric Almodovar DO, FACC, [facility]  Cardiac Electrophysiologist

## 2020-12-14 NOTE — OP NOTE
Cardiac Electrophysiology Procedure Note          [facility]                      AV NODE CATHETER ABLATION     PROCEDURES PERFORMED:    · Catheter ablation of the atrioventricular (AV) node.  · His Bundle Recording.    PREPROCEDURAL DIAGNOSES:     · Persistent Atrial fibrillation  · Permanent pacemaker implanted previously    POST PROCEDURE DIANGOSES:    As above.    INDICATION FOR PROCEDURE:  Briefly, Carli Carver is a 85 y.o. female with a history of longstanding atrial fibrillation who has failed pharmacologic rate control.    MODERATE SEDATION FOR PROCEDURE:     Moderate sedation was given during this procedure.    I supervised and directed RN to administer this sedation.  This staff member also monitored the patient's hemodynamic and respiratory status and response to these medications.  Please see the full detailed procedure report generated by the electrophysiology laboratory staff.  The patient tolerated moderate sedation well.  There were no complications regarding sedation.  The total dose of fentanyl was 100 mcg and the total dose of midazolam was 3 mg.  The total dose of Brevital was 50 mg.  First sedation was administered at 1351 and continued through 1421.    PROCEDURE NARRATIVE:  The patient was able to give written informed consent after revisiting the key portions of the risk versus benefit profile of the procedure.  This discussion was framed by our lengthy conversations  (please see our detailed notes).  Patient verbalized strong understanding of this discussion and a strong desire to proceed with the procedure.  Please note that this detailed informed consent process utilized mutual and shared decision making process between all parties involved, principally the physician and patient, but also potentially with input from the patient's selected family and friends.  '  The patient was brought to the EP laboratory in the post absorptive state. The  patient was then prepared and dropped in a routing sterile fashion.  Seldinger access was obtained at the right common femoral vein with a single venipuncture. An SR0 sheath was placed in an over the wire fashion into the inferior vena cava / right atrium.    A Blazer II irrigated tip 4 mm MiFi ablation catheter was placed through the SR0 sheath and positioned at the His bundle.  The HV interval was measured as 62 msec.    The ablation catheter was then positioned at the compact AV node.    Radiofrequency application was performed with a maximum of 50 w power for 6 seconds.  This resulted in complete heart block.    Patient was then monitored for 5 minutes during which the patient was noted to have a stable junctional rhythm.    The catheters and sheath was then removed from the body.    A hemostatic \"figure of eight\" suture was applied to the cutaneous and subcutaneous tissue overlying the vascular puncture site at the groin.  This suture is to be removed prior to the patient being discharged home.    COMPLICATIONS: none    EBL: minimal    RADIATION EXPOSURE: 3 mGy over 3.2 minutes    KEY PROCEDURAL FINDINGS:    ·  Successful AV node ablation   Stable and nominal pacemaker function    POST PROCEDURAL PLAN:    1.  DDR 80 -120 x three months then DDDR 60 - 120  2.  Anticoagulation will be continued  · The patient will be seen at our office per routine follow up.  '  "

## 2021-03-18 PROCEDURE — 93296 REM INTERROG EVL PM/IDS: CPT | Performed by: INTERNAL MEDICINE

## 2021-03-18 PROCEDURE — 93294 REM INTERROG EVL PM/LDLS PM: CPT | Performed by: INTERNAL MEDICINE

## 2021-04-15 RX ORDER — APIXABAN 5 MG/1
TABLET, FILM COATED ORAL
Qty: 60 TABLET | Refills: 6 | Status: SHIPPED | OUTPATIENT
Start: 2021-04-15 | End: 2021-11-17 | Stop reason: SDUPTHER

## 2021-08-23 ENCOUNTER — OFFICE VISIT (OUTPATIENT)
Dept: CARDIOLOGY | Facility: CLINIC | Age: 86
End: 2021-08-23

## 2021-08-23 VITALS
WEIGHT: 137 LBS | OXYGEN SATURATION: 98 % | HEIGHT: 63 IN | DIASTOLIC BLOOD PRESSURE: 82 MMHG | BODY MASS INDEX: 24.27 KG/M2 | SYSTOLIC BLOOD PRESSURE: 144 MMHG | HEART RATE: 98 BPM

## 2021-08-23 DIAGNOSIS — Z95.0 CARDIAC PACEMAKER IN SITU: ICD-10-CM

## 2021-08-23 DIAGNOSIS — I48.20 ATRIAL FIBRILLATION, CHRONIC (HCC): Primary | ICD-10-CM

## 2021-08-23 DIAGNOSIS — Z79.01 CHRONIC ANTICOAGULATION: ICD-10-CM

## 2021-08-23 DIAGNOSIS — I10 ESSENTIAL HYPERTENSION: ICD-10-CM

## 2021-08-23 PROBLEM — I48.0 PAROXYSMAL ATRIAL FIBRILLATION: Status: RESOLVED | Noted: 2020-04-09 | Resolved: 2021-08-23

## 2021-08-23 PROBLEM — I48.19 ATRIAL FIBRILLATION, PERSISTENT: Status: RESOLVED | Noted: 2020-08-17 | Resolved: 2021-08-23

## 2021-08-23 PROCEDURE — 93279 PRGRMG DEV EVAL PM/LDLS PM: CPT | Performed by: NURSE PRACTITIONER

## 2021-08-23 PROCEDURE — 93000 ELECTROCARDIOGRAM COMPLETE: CPT | Performed by: NURSE PRACTITIONER

## 2021-08-23 PROCEDURE — 99214 OFFICE O/P EST MOD 30 MIN: CPT | Performed by: NURSE PRACTITIONER

## 2021-08-23 RX ORDER — LORATADINE 10 MG/1
CAPSULE, LIQUID FILLED ORAL
COMMUNITY
End: 2022-02-28

## 2021-08-23 NOTE — PROGRESS NOTES
Cardiac Electrophysiology Outpatient Follow Up Note            [facility] at [facility]    Follow Up Office Visit      Carli Carver  [id number]  08/23/2021    Primary Care Physician: Provider, No Known    Referred By: No ref. provider found    Subjective     Chief Complaint:   Diagnoses and all orders for this visit:    1. Atrial fibrillation, chronic (CMS/HCC) (Primary)    2. Chronic anticoagulation    3. Cardiac pacemaker in situ    4. Essential hypertension    Other orders  -     ECG 12 Lead      Chief Complaint   Patient presents with   • Cardiac pacemaker in situ   • Atrial fibrillation, persistent (CMS/HCC)     Problem List:     1. Persistent atrial fibrillation              A) CHADSVASc = 4, on Eliquis              B) Amiodarone 04/2020              C) S/p successful ECV, 4/23/2020              D) Echocardiogram 4/23/2020: EF 56-60%, mild concentric LVH, mild to moderate MR              E) Recurrent AFib 5/7/2020 with Amiodarone discontinued 5/20/2020    F) AV node ablation 7/15/2020 per Dr. Almodovar     1.  Sick sinus syndrome              A)Holter 2014 Bradycardia              B)[employer] Pacemaker 2014     2. Chest Pain              A)Echo EF normal,  mild AI 2010              b)Negative MPS 2014/Moderate calcium score 2014-Medical therapy      2. HTN- controlled  3. HLD-Statin   4. Hypothyroidism- Chronic synthroid       History of Present Illness:   Carli Carver is a 86 y.o. female who presents to my electrophysiology clinic for follow up of her history of sinus node dysfunction status post [employer] permanent pacemaker implant.  Patient was last seen in EP follow-up October 2020 Dr. Almodovar with noted remote monitoring system in place.  Upon evaluation patient doing well overall from a cardiovascular standpoint without complaints.  Patient denies chest pain, palpitations, dizziness, presyncope, or syncope.  Patient denies unusual fatigue,  shortness of breath, orthopnea, or bilateral lower extremity edema.  Patient noted with history of atrial fibrillation now status post AV node ablation July 2020.  Patient admits compliance to Eliquis therapy without noted side effects, signs of bleeding, or TIA/strokelike symptoms.  Patient's blood pressures controlled in office today with reported controlled current medical therapy at home.      Review of Systems:   Constitutional: No fevers or chills, no recent weight gain or weight loss or fatigue  Eyes: No visual loss, blurred vision, double vision, yellow sclerae.  ENT: No headaches, hearing loss, vertigo, congestion or sore throat.   Cardiovascular: Per HPI  Respiratory: No cough or wheezing, no sputum production, no hematemesis   Gastrointestinal: No abdominal pain, no nausea, vomiting, constipation, diarrhea, melena.   Genitourinary: No dysuria, hematuria or increased frequency.  Musculoskeletal:  No gait disturbance, weakness or joint pain or stiffness  Integumentary: No rashes, urticaria, ulcers or sores.   Neurological: No headache, dizziness, syncope, paralysis, ataxia, no prior CVA/TIA  Psychiatric: No anxiety, or depression  Endocrine: No diaphoresis, cold or heat intolerance. No polyuria or polydipsia.   Hematologic/Lymphatic: No anemia, abnormal bruising or bleeding. No history of DVT/PE.     Past Medical History:   Past Medical History:   Diagnosis Date   • A-fib (CMS/HCC)    • Atrial flutter (CMS/HCC)    • Bradycardia    • Cataract    • Constipation    • Heartburn    • Hyperlipidemia    • Hypothyroid    • Wears glasses        Past Surgical History:   Past Surgical History:   Procedure Laterality Date   • ABLATION OF DYSRHYTHMIC FOCUS     • CARDIAC ELECTROPHYSIOLOGY PROCEDURE N/A 7/15/2020    Procedure: AV node ablation- pt has SJM pacemaker;  Surgeon: Eric Almodovar DO;  Location: [facility] INVASIVE LOCATION;  Service: Cardiovascular;  Laterality: N/A;   • CARDIOVERSION     • CATARACT EXTRACTION  Right    • COLONOSCOPY     • INSERT / REPLACE / REMOVE PACEMAKER  08/29/2014    [facility], Location: [facility]   • TUBAL ABDOMINAL LIGATION         Family History:   Family History   Problem Relation Age of Onset   • Lung cancer Mother    • Heart disease Brother        Social History:   Social History     Socioeconomic History   • Marital status:      Spouse name: Not on file   • Number of children: Not on file   • Years of education: Not on file   • Highest education level: Not on file   Tobacco Use   • Smoking status: Never Smoker   • Smokeless tobacco: Never Used   Vaping Use   • Vaping Use: Never used   Substance and Sexual Activity   • Alcohol use: No   • Drug use: No   • Sexual activity: Defer       Medications:     Current Outpatient Medications:   •  atorvastatin (LIPITOR) 20 MG tablet, Take 20 mg by mouth Every Night., Disp: , Rfl:   •  BIOTIN PO, Take 5,000 mcg by mouth Daily., Disp: , Rfl:   •  calcium carbonate (TUMS) 500 MG chewable tablet, Chew 1 tablet As Needed for Indigestion or Heartburn., Disp: , Rfl:   •  Calcium-Magnesium-Vitamin D (CALCIUM 1200+D3 PO), Take 1 tablet by mouth Daily. 1000 units d3, Disp: , Rfl:   •  docusate sodium (COLACE) 100 MG capsule, Take 100 mg by mouth Daily As Needed., Disp: , Rfl:   •  Eliquis 5 MG tablet tablet, TAKE ONE TABLET BY MOUTH TWICE A DAY, Disp: 60 tablet, Rfl: 6  •  levothyroxine (SYNTHROID, LEVOTHROID) 88 MCG tablet, Take 88 mcg by mouth Daily., Disp: , Rfl:   •  Loratadine 10 MG capsule, Take  by mouth., Disp: , Rfl:   •  Magnesium 250 MG tablet, Take 500 mg by mouth Every Other Day., Disp: , Rfl:   •  Melatonin 10 MG capsule, Take 10 mg by mouth At Night As Needed. Takes 1-2 nightly , Disp: , Rfl:   •  Multiple Vitamins-Minerals (MULTIVITAMIN ADULT PO), Take 1 tablet by mouth Daily., Disp: , Rfl:   •  NON FORMULARY, Take 1 dose by mouth Daily. ALPHA CRS, CELLULAR VITALITY COMPLEX, Disp: , Rfl:   •  NON FORMULARY, Take 1 dose by mouth  "Daily. MICRO PLEX VMZ, FOOD NUTRIENT COMPLEX DIETARY SUPP, Disp: , Rfl:     Allergies:   No Known Allergies    Objective   Vital Signs:   Vitals:    08/23/21 1038   BP: 144/82   BP Location: Right arm   Patient Position: Sitting   Pulse: 98   SpO2: 98%   Weight: 62.1 kg (137 lb)   Height: 160 cm (63\")       PHYSICAL EXAM  General appearance: Awake, alert, cooperative  Head: Normocephalic, without obvious abnormality, atraumatic  Eyes: Conjunctivae/corneas clear, EOMs intact  Neck: no adenopathy, no carotid bruit, no JVD and thyroid: not enlarged  Lungs: clear to auscultation bilaterally and no rhonchi or crackles\", ' symmetric  Heart: regular rate and rhythm, S1, S2 normal, no murmur, click, rub or gallop  Abdomen: Soft, non-tender, bowel sounds normal,  no organomegaly  Extremities: extremities normal, atraumatic, no cyanosis or edema  Skin: Skin color, turgor normal, no rashes or lesions  Neurologic: Grossly normal     Lab Results   Component Value Date    GLUCOSE 95 07/12/2020    CALCIUM 9.6 07/12/2020     07/12/2020    K 4.2 07/12/2020    CO2 27.0 07/12/2020     07/12/2020    BUN 18 07/12/2020    CREATININE 0.86 07/12/2020    EGFRIFNONA 63 07/12/2020    BCR 20.9 07/12/2020    ANIONGAP 11.0 07/12/2020     Lab Results   Component Value Date    WBC 5.61 07/12/2020    HGB 14.6 07/12/2020    HCT 43.2 07/12/2020    MCV 96.2 07/12/2020     07/12/2020     Cardiac Testing:     I personally viewed and interpreted the patient's EKG/Telemetry/lab data      ECG 12 Lead    Date/Time: 8/23/2021 11:16 AM  Performed by: Dain Ospina APRN  Authorized by: Dain Ospina APRN   Comparison: compared with previous ECG from 10/19/2020  Similar to previous ECG  Rhythm: paced  BPM: 65            Assessment & Plan    Diagnoses and all orders for this visit:    1. Atrial fibrillation, chronic (CMS/HCC) (Primary)    2. Chronic anticoagulation    3. Cardiac pacemaker in situ    4. Essential " hypertension    Other orders  -     ECG 12 Lead         Diagnosis Plan   1. Atrial fibrillation, chronic (CMS/HCC)  .  Stable status post AV node ablation July 2020 peripheral anticoagulated on Eliquis therapy without complaints and stable pacemaker function in office today.     2. Chronic anticoagulation  .  Tolerating Eliquis therapy without noted side effects, signs of bleeding, or TIA/strokelike symptoms.    CHADSVASc =  4    Continue Eliquis 5 mg twice daily      3. Cardiac pacemaker in situ  .  Device interrogation: [employer] VVI pacemaker at VVI 60 ppm with acceptable lead threshold impedance and 99% RV pacing status post AV node ablation.  Battery voltage at 2.93 V with home monitoring system in place and no arrhythmia events detected.      This patient's Cardiac Implanted Electronic Device was manually interrogated and reprogrammed during the patient encounter today.  Iterative programming changes were manually made to determine the sensing threshold, pacing threshold, lead impedance as well as underlying cardiac rhythm.  These programming changes were not limited to but included some or all of the following when appropriate: pacing mode, programmed AV delays, blanking periods, and refractory periods.  Data obtained as a result of these manual programing changes informed the patient's CIED permanent programming.      4. Essential hypertension  .  Controlled in office today with reported controlled off medical therapy at home.  Patient educated on low-sodium diet.  Patient verbalizes understanding.     Body mass index is 24.27 kg/m².    I spent 21 minutes in consultation with this patient which included more than 65% of this time in direct face-to-face counseling, physical examination and discussion of my assessment and findings and shared decision making with the patient.  The remainder of the time not spent face to face was performing one, some or all of the following actions:  preparing to see this  patient ( eg. Review of tests),  ordering medications, tests or procedures ), care coordination, discussion of the plan with other healthcare providers, documenting clinical information in Epic well as independently interpreting results and communicating results to patient, family and or caregiver.  All time noted occurred on the date of service.    Follow Up: 1 year follow-up with [employer] device check      Thank you for allowing me to participate in the care of your patient. Please to not hesitate to contact me with additional questions or concerns.        Electronically signed by LYUDMILA Goss, 08/23/21, 11:15 AM EDT.   [facility] / [facility]

## 2021-09-16 PROCEDURE — 93296 REM INTERROG EVL PM/IDS: CPT | Performed by: INTERNAL MEDICINE

## 2021-09-16 PROCEDURE — 93294 REM INTERROG EVL PM/LDLS PM: CPT | Performed by: INTERNAL MEDICINE

## 2021-12-16 PROCEDURE — 93296 REM INTERROG EVL PM/IDS: CPT | Performed by: INTERNAL MEDICINE

## 2021-12-16 PROCEDURE — 93294 REM INTERROG EVL PM/LDLS PM: CPT | Performed by: INTERNAL MEDICINE

## 2022-01-11 ENCOUNTER — HOSPITAL ENCOUNTER (OUTPATIENT)
Dept: GENERAL RADIOLOGY | Facility: HOSPITAL | Age: 87
Discharge: HOME | End: 2022-01-11
Admitting: NURSE PRACTITIONER

## 2022-01-11 ENCOUNTER — TRANSCRIBE ORDERS (OUTPATIENT)
Dept: ADMINISTRATIVE | Facility: HOSPITAL | Age: 87
End: 2022-01-11

## 2022-01-11 DIAGNOSIS — M54.6 PAIN IN THORACIC SPINE: Primary | ICD-10-CM

## 2022-01-11 PROCEDURE — 72072 X-RAY EXAM THORAC SPINE 3VWS: CPT

## 2022-02-28 ENCOUNTER — OFFICE VISIT (OUTPATIENT)
Dept: CARDIOLOGY | Facility: CLINIC | Age: 87
End: 2022-02-28

## 2022-02-28 VITALS
OXYGEN SATURATION: 98 % | SYSTOLIC BLOOD PRESSURE: 142 MMHG | BODY MASS INDEX: 24.1 KG/M2 | DIASTOLIC BLOOD PRESSURE: 82 MMHG | HEART RATE: 100 BPM | HEIGHT: 63 IN | WEIGHT: 136 LBS

## 2022-02-28 DIAGNOSIS — I44.2 COMPLETE HEART BLOCK: Primary | ICD-10-CM

## 2022-02-28 DIAGNOSIS — I48.20 ATRIAL FIBRILLATION, CHRONIC: ICD-10-CM

## 2022-02-28 DIAGNOSIS — I49.5 SICK SINUS SYNDROME: ICD-10-CM

## 2022-02-28 DIAGNOSIS — Z95.0 CARDIAC PACEMAKER IN SITU: ICD-10-CM

## 2022-02-28 DIAGNOSIS — Z79.01 CHRONIC ANTICOAGULATION: ICD-10-CM

## 2022-02-28 DIAGNOSIS — I10 ESSENTIAL HYPERTENSION: ICD-10-CM

## 2022-02-28 PROBLEM — R00.1 BRADYCARDIA: Status: RESOLVED | Noted: 2019-03-18 | Resolved: 2022-02-28

## 2022-02-28 PROCEDURE — 99214 OFFICE O/P EST MOD 30 MIN: CPT | Performed by: INTERNAL MEDICINE

## 2022-02-28 PROCEDURE — 93279 PRGRMG DEV EVAL PM/LDLS PM: CPT | Performed by: INTERNAL MEDICINE

## 2022-02-28 NOTE — PROGRESS NOTES
Cardiac Electrophysiology Outpatient Follow Up Note            [facility]    Follow Up Office Visit      Carli Carver  [id number]  02/28/2022  [unfilled]  [unfilled]    Primary Care Physician: Kadie Gudino APRN    Referred By: No ref. provider found    Subjective     Chief Complaint:   Diagnoses and all orders for this visit:    1. Complete heart block (HCC) (Primary)    2. Essential hypertension    3. Cardiac pacemaker in situ    4. Atrial fibrillation, chronic (HCC)    5. Chronic anticoagulation    6. Sick sinus syndrome (HCC)      Chief Complaint   Patient presents with   • Cardiac pacemaker in situ   • Atrial fibrillation, persistent (CMS/HCC)       History of Present Illness:   Carli Carver is a 87 y.o. female who presents to my electrophysiology clinic for follow up of above complaints.  Here with her daughter.  Not exercising much.  Largely sedentary.  Occasionally have a sharp chest pain sharp pain up in her sternum or throat.  This occurs at rest.  It goes away after several seconds.  No bruising problems no issue with blood thinner no nausea vomit fevers chills syncope presyncope.      Review of Systems:   Constitutional: No fevers or chills, no recent weight gain or weight loss or fatigue  Eyes: No visual loss, blurred vision, double vision, yellow sclerae.  ENT: No headaches, hearing loss, vertigo, congestion or sore throat.   Cardiovascular: Per HPI  Respiratory: No cough or wheezing, no sputum production, no hematemesis   Gastrointestinal: No abdominal pain, no nausea, vomiting, constipation, diarrhea, melena.   Genitourinary: No dysuria, hematuria or increased frequency.  Musculoskeletal:  No gait disturbance, weakness or joint pain or stiffness  Integumentary: No rashes, urticaria, ulcers or sores.   Neurological: No headache, dizziness, syncope, paralysis, ataxia, no prior CVA/TIA  Psychiatric: No anxiety, or  depression  Endocrine: No diaphoresis, cold or heat intolerance. No polyuria or polydipsia.   Hematologic/Lymphatic: No anemia, abnormal bruising or bleeding. No history of DVT/PE.      Past Medical History:   Past Medical History:   Diagnosis Date   • A-fib (HCC)    • Atrial flutter (HCC)    • Bradycardia    • Cataract    • Constipation    • Heartburn    • Hyperlipidemia    • Hypothyroid    • Wears glasses        Past Surgical History:   Past Surgical History:   Procedure Laterality Date   • ABLATION OF DYSRHYTHMIC FOCUS     • CARDIAC ELECTROPHYSIOLOGY PROCEDURE N/A 7/15/2020    Procedure: AV node ablation- pt has SJM pacemaker;  Surgeon: Eric Almodovar DO;  Location: [facility] INVASIVE LOCATION;  Service: Cardiovascular;  Laterality: N/A;   • CARDIOVERSION     • CATARACT EXTRACTION Right    • CATARACT EXTRACTION     • COLONOSCOPY     • INSERT / REPLACE / REMOVE PACEMAKER  08/29/2014    [facility], Location: [facility]   • TUBAL ABDOMINAL LIGATION         Family History:   Family History   Problem Relation Age of Onset   • Lung cancer Mother    • Heart disease Brother        Social History:   Social History     Socioeconomic History   • Marital status:    Tobacco Use   • Smoking status: Never Smoker   • Smokeless tobacco: Never Used   Vaping Use   • Vaping Use: Never used   Substance and Sexual Activity   • Alcohol use: No   • Drug use: No   • Sexual activity: Defer       Medications:     Current Outpatient Medications:   •  apixaban (Eliquis) 5 MG tablet tablet, Take 1 tablet by mouth 2 (Two) Times a Day., Disp: 60 tablet, Rfl: 6  •  atorvastatin (LIPITOR) 20 MG tablet, Take 20 mg by mouth Every Night., Disp: , Rfl:   •  BIOTIN PO, Take 5,000 mcg by mouth Daily., Disp: , Rfl:   •  calcium carbonate (TUMS) 500 MG chewable tablet, Chew 1 tablet As Needed for Indigestion or Heartburn., Disp: , Rfl:   •  Calcium-Magnesium-Vitamin D (CALCIUM 1200+D3 PO), Take 1 tablet by mouth Daily. 1000 units d3,  "Disp: , Rfl:   •  docusate sodium (COLACE) 100 MG capsule, Take 100 mg by mouth Daily As Needed., Disp: , Rfl:   •  levothyroxine (SYNTHROID, LEVOTHROID) 88 MCG tablet, Take 88 mcg by mouth Daily., Disp: , Rfl:   •  Magnesium 250 MG tablet, Take 500 mg by mouth Every Other Day., Disp: , Rfl:   •  Melatonin 10 MG capsule, Take 10 mg by mouth At Night As Needed. Takes 1-2 nightly , Disp: , Rfl:   •  Multiple Vitamins-Minerals (MULTIVITAMIN ADULT PO), Take 1 tablet by mouth Daily., Disp: , Rfl:   •  NON FORMULARY, Take 1 dose by mouth Daily. ALPHA CRS, CELLULAR VITALITY COMPLEX, Disp: , Rfl:     Allergies:   No Known Allergies    Objective   Vital Signs:   Vitals:    02/28/22 1040   BP: 142/82   BP Location: Left arm   Patient Position: Sitting   Pulse: 100   SpO2: 98%   Weight: 61.7 kg (136 lb)   Height: 160 cm (63\")       PHYSICAL EXAM  General appearance: Awake, alert, cooperative  Head: Normocephalic, without obvious abnormality, atraumatic  Eyes: Conjunctivae/corneas clear, EOMs intact  Neck: no adenopathy, no carotid bruit, no JVD and thyroid: not enlarged  Lungs: clear to auscultation bilaterally and no rhonchi or crackles\", ' symmetric  Heart: regular rate and rhythm, S1, S2 normal, no murmur, click, rub or gallop  Abdomen: Soft, non-tender, bowel sounds normal,  no organomegaly  Extremities: extremities normal, atraumatic, no cyanosis or edema  Skin: Skin color, turgor normal, no rashes or lesions  Neurologic: Grossly normal     Lab Results   Component Value Date    GLUCOSE 95 07/12/2020    CALCIUM 9.6 07/12/2020     07/12/2020    K 4.2 07/12/2020    CO2 27.0 07/12/2020     07/12/2020    BUN 18 07/12/2020    CREATININE 0.86 07/12/2020    EGFRIFNONA 63 07/12/2020    BCR 20.9 07/12/2020    ANIONGAP 11.0 07/12/2020     Lab Results   Component Value Date    WBC 5.61 07/12/2020    HGB 14.6 07/12/2020    HCT 43.2 07/12/2020    MCV 96.2 07/12/2020     07/12/2020     No results found for: INR, " PROTIME  No results found for: TSH, L5SLYVS, I0VMVLP, THYROIDAB    Cardiac Testing:     I personally viewed and interpreted the patient's EKG/Telemetry/lab data    Procedures    Tobacco Cessation: N/A  Obstructive Sleep Apnea Screening: Completed    Assessment & Plan    Diagnoses and all orders for this visit:    1. Complete heart block (HCC) (Primary)    2. Essential hypertension    3. Cardiac pacemaker in situ    4. Atrial fibrillation, chronic (HCC)    5. Chronic anticoagulation    6. Sick sinus syndrome (HCC)         Diagnosis Plan   1. Complete heart block (HCC)   right-sided transvenous dual-chamber permanent pacemaker.  No symptoms.  Doing well.   2. Essential hypertension   pressure well controlled.  Little bit of whitecoat hypertension today.   3. Cardiac pacemaker in situ   right-sided [place] device.  Normal device function.  Appropriate pacing sensing impedance values.  Pacemaker dependent.    This patient's Cardiac Implanted Electronic Device was manually interrogated and reprogrammed during the patient encounter today.  Iterative programming changes were manually made to determine the sensing threshold, pacing threshold, lead impedance as well as underlying cardiac rhythm.  These programming changes were not limited to but included some or all of the following when appropriate: pacing mode, programmed AV delays, blanking periods, and refractory periods.  Data obtained as a result of these manual programing changes informed the patient's CIED permanent programming.   4. Atrial fibrillation, chronic (HCC)   apixaban.  Correct dose.  Doing well.  No stroke symptoms.  Compliant.  Taking it every day.   5. Chronic anticoagulation   no issues here.  Doing well.  Lifelong anticoagulation.   6. Sick sinus syndrome (HCC)   transition now to permanent A. fib.     Body mass index is 24.09 kg/m².    I spent 38 minutes in consultation with this patient which included more than 65% of this time in direct  face-to-face counseling, physical examination and discussion of my assessment and findings and shared decision making with the patient.  The remainder of the time not spent face to face was performing one, some or all of the following actions:  preparing to see this patient ( eg. Review of tests),  ordering medications, tests or procedures ), care coordination, discussion of the plan with other healthcare providers, documenting clinical information in Epic well as independently interpreting results and communicating results to patient, family and or caregiver.  All time noted occurred on the date of service.    Follow Up:       Thank you for allowing me to participate in the care of your patient. Please to not hesitate to contact me with additional questions or concerns.      Eric Almodovar DO, FACC, RS  Cardiac Electrophysiologist  [facility] / [facility]

## 2022-03-17 PROCEDURE — 93294 REM INTERROG EVL PM/LDLS PM: CPT | Performed by: INTERNAL MEDICINE

## 2022-03-17 PROCEDURE — 93296 REM INTERROG EVL PM/IDS: CPT | Performed by: INTERNAL MEDICINE

## 2022-05-27 ENCOUNTER — OFFICE VISIT (OUTPATIENT)
Dept: INTERNAL MEDICINE | Facility: CLINIC | Age: 87
End: 2022-05-27

## 2022-05-27 VITALS
DIASTOLIC BLOOD PRESSURE: 78 MMHG | HEIGHT: 62 IN | SYSTOLIC BLOOD PRESSURE: 130 MMHG | HEART RATE: 80 BPM | BODY MASS INDEX: 24.99 KG/M2 | TEMPERATURE: 98.4 F | WEIGHT: 135.8 LBS

## 2022-05-27 DIAGNOSIS — Z95.0 CARDIAC PACEMAKER IN SITU: ICD-10-CM

## 2022-05-27 DIAGNOSIS — E78.2 MIXED HYPERLIPIDEMIA: ICD-10-CM

## 2022-05-27 DIAGNOSIS — I10 ESSENTIAL HYPERTENSION: ICD-10-CM

## 2022-05-27 DIAGNOSIS — E03.9 ACQUIRED HYPOTHYROIDISM: ICD-10-CM

## 2022-05-27 DIAGNOSIS — I48.20 ATRIAL FIBRILLATION, CHRONIC: Primary | ICD-10-CM

## 2022-05-27 DIAGNOSIS — R73.03 PREDIABETES: ICD-10-CM

## 2022-05-27 DIAGNOSIS — R12 HEARTBURN: ICD-10-CM

## 2022-05-27 PROCEDURE — 99214 OFFICE O/P EST MOD 30 MIN: CPT | Performed by: PHYSICIAN ASSISTANT

## 2022-05-27 NOTE — PROGRESS NOTES
[facility] Medicine    Carli Carver  1935   [id number]      Patient Care Team:  Zamzam Amador PA-C as PCP - General (Physician Assistant)  Eric Almodovar DO as Consulting Physician (Cardiology)  SALBADOR Nicole Jr., MD as Consulting Physician (Ophthalmology)  Lida Lin APRN as Nurse Practitioner (Family Medicine)    Chief Complaint::   Chief Complaint   Patient presents with   • Hypertension     Here to establish care        HPI  The patient is accompanied by her daughter who states that the patient established care with LYUDMILA Gonsalez, in 2021 after Dr. Rivera retired. She reports that the patient only saw Kadie Gudino a couple of times before she received a notice that she left and went to [place]. She adds that she is unsure if the patient's records were ever sent from Dr. Rivera's office. She states that the patient was assigned another doctor, but it was not a good fit. The patient states that she was born and raised in [place], [place]. She adds that she has lived in [place], [place], and [place]. She reports that has been back home in [place] for about 30 years. She has 4 children, 2 girls and 2 boys, and she is . She reports that she worked outside the home as a  when she was younger.     Atrial fibrillation  The adult female present states that the patient had several cardioversions, and then she had an AV node ablation. She reports that the patient has a history of atrial fibrillation, and they tried to shock her heart back into rhythm multiple times. She adds that due to the AV node ablation, the patient is now completely dependent on her pacemaker, and she has had it since 2014. The patient reports that she sees Dr. Almodovar for electrophysiology, and she is currently taking Eliquis 5 mg twice daily.     Cataract  The patient states that she was seeing Dr. Nicole for ophthalmology. The adult female present states that Dr. Nicole is  semi-retired, and it is hard to get in to see him. She reports that the patient recently had cataract surgery, and she had to follow up with another doctor.     Hypothyroidism  The patient has a history of hypothyroidism and is compliant with her medication. Her most recent labs were obtained on 05/02/2022.    Hyperlipidemia  The patient's most recent labs show that her cholesterol levels are within normal range. She reports that she takes Lipitor 20 mg every night around dinner time. She denies any abdominal pain, and she reports having muscle spasms every once in awhile.    Health maintenance  The patient sees LYUDMILA Barrera, for dermatology yearly. The adult female present states the patient's previous lab work done by Kadie Gudino showed that her A1c was mildly elevated. Her most recent labs show an A1c of 6.7 %. Her weight has remained the same for a while, and she would like it to be less. She walks 2 to 3 times per week for 20 to 30 minutes. She denies watching what she eats, and she knows that she has to do better. The adult female present states that the patient was going to the [employer] and riding a stationary bike. She adds that she does not want to take any more medications. The patient states that she eats vegetables and a lot of chicken, but she does not like fish. She adds that she eats a lot of crackers and frozen pizza. She states that she does get tired easily, and she wakes up nightly at 2:00 AM and 5:00 AM. She reports that she no longer gets mammograms or bone density scans. She denies any recent falls or fractures. She states that she takes biotin, calcium supplements and magnesium every other day. She takes Metamucil daily and Tums as needed. She denies any pain or difficulty swallowing.      Immunizations  The patient has not received her COVID-19, pneumococcal, or Shingrix vaccine. She denies having COVID-19.    Allergies  The patient reports that she did have vertigo about 1 month  ago. She was told to take Flonase twice daily and an allergy pill. The patient does have seasonal allergies. The adult female present states that the patient had an upper respiratory infection in 02/2022 or 03/2022, and she had a lot of drainage. She adds that she also had some fluid in her ears.    Hypertension  The patient's current blood pressure is 130/70 mmHg. She reports that it was higher when she first came in. She denies any headaches or issues with high blood pressure.    Heartburn  The patient reports that she has heartburn every now and then, and she takes Tums for it. She states that her stomach is tender and she notices it occasionally. She denies having a hernia. She denies drinking coffee, but she does drink decaf or caffeine free tea.       Patient Active Problem List   Diagnosis   • Sick sinus syndrome (HCC)   • Essential hypertension   • Cardiac pacemaker in situ   • Complete heart block (HCC)   • Atrial fibrillation, chronic (HCC)   • Chronic anticoagulation   • Mixed hyperlipidemia   • Heartburn   • Acquired hypothyroidism   • Prediabetes        Past Medical History:   Diagnosis Date   • A-fib (HCC)    • Atrial flutter (HCC)    • Bradycardia    • Cataract    • Constipation    • Heartburn    • Hyperlipidemia    • Hypothyroid    • Wears glasses        Past Surgical History:   Procedure Laterality Date   • ABLATION OF DYSRHYTHMIC FOCUS     • CARDIAC ELECTROPHYSIOLOGY PROCEDURE N/A 7/15/2020    Procedure: AV node ablation- pt has SJM pacemaker;  Surgeon: Eric Almodovar DO;  Location: [facility] INVASIVE LOC[facility];  Service: Cardiovascular;  Laterality: N/A;   • CARDIOVERSION     • CATARACT EXTRACTION Right    • CATARACT EXTRACTION     • COLONOSCOPY     • INSERT / REPLACE / REMOVE PACEMAKER  08/29/2014    [facility], Location: [facility]   • TUBAL ABDOMINAL LIGATION         Family History   Problem Relation Age of Onset   • Lung cancer Mother    • Heart disease Brother        Social History  "    Socioeconomic History   • Marital status:    Tobacco Use   • Smoking status: Never Smoker   • Smokeless tobacco: Never Used   Vaping Use   • Vaping Use: Never used   Substance and Sexual Activity   • Alcohol use: No   • Drug use: No   • Sexual activity: Defer       No Known Allergies    Review of Systems   Constitutional: Negative for activity change, appetite change, diaphoresis, fatigue, unexpected weight gain and unexpected weight loss.   HENT: Negative for congestion, hearing loss, sinus pressure, sore throat, swollen glands and trouble swallowing.    Eyes: Negative for blurred vision, double vision and visual disturbance.   Respiratory: Negative for chest tightness and shortness of breath.    Cardiovascular: Negative for chest pain, palpitations and leg swelling.   Gastrointestinal: Positive for indigestion. Negative for abdominal pain, blood in stool and GERD.   Endocrine: Negative for cold intolerance and heat intolerance.   Genitourinary: Negative for decreased urine volume, dysuria, hematuria, pelvic pain, pelvic pressure, urgency and urinary incontinence.   Musculoskeletal: Negative for arthralgias and myalgias.   Skin: Negative for skin lesions.   Neurological: Negative for tremors, seizures, syncope, speech difficulty, weakness, headache, memory problem and confusion.   Hematological: Negative for adenopathy. Does not bruise/bleed easily.   Psychiatric/Behavioral: Negative for sleep disturbance and depressed mood. The patient is not nervous/anxious.         Vital Signs  Vitals:    05/27/22 1237 05/27/22 1320   BP: 152/84 130/78   BP Location: Left arm    Patient Position: Sitting    Cuff Size: Adult    Pulse: 80    Temp: 98.4 °F (36.9 °C)    TempSrc: Infrared    Weight: 61.6 kg (135 lb 12.8 oz)    Height: 157.5 cm (62\")    PainSc: 0-No pain      Body mass index is 24.84 kg/m².  BMI is within normal parameters. No other follow-up for BMI required.     Advance Care Planning   ACP discussion was " held with the patient during this visit. Patient has an advance directive in EMR which is still valid.        Current Outpatient Medications:   •  apixaban (Eliquis) 5 MG tablet tablet, Take 1 tablet by mouth 2 (Two) Times a Day., Disp: 60 tablet, Rfl: 6  •  atorvastatin (LIPITOR) 20 MG tablet, Take 20 mg by mouth Every Night., Disp: , Rfl:   •  BIOTIN PO, Take 5,000 mcg by mouth Daily., Disp: , Rfl:   •  Calcium-Magnesium-Vitamin D (CALCIUM 1200+D3 PO), Take 1 tablet by mouth Daily. 1000 units d3, Disp: , Rfl:   •  docusate sodium (COLACE) 100 MG capsule, Take 100 mg by mouth Daily As Needed., Disp: , Rfl:   •  levothyroxine (SYNTHROID, LEVOTHROID) 88 MCG tablet, Take 88 mcg by mouth Daily., Disp: , Rfl:   •  Magnesium 250 MG tablet, Take 500 mg by mouth Every Other Day., Disp: , Rfl:   •  Multiple Vitamins-Minerals (MULTIVITAMIN ADULT PO), Take 1 tablet by mouth Daily., Disp: , Rfl:     Physical Exam  Vitals reviewed.   Constitutional:       Appearance: Normal appearance. She is well-developed.   HENT:      Head: Normocephalic and atraumatic.      Right Ear: Hearing, tympanic membrane, ear canal and external ear normal.      Left Ear: Hearing, tympanic membrane, ear canal and external ear normal.      Nose: Nose normal.      Mouth/Throat:      Mouth: Mucous membranes are moist.      Pharynx: Oropharynx is clear. Uvula midline.   Eyes:      General: Lids are normal.      Conjunctiva/sclera: Conjunctivae normal.      Pupils: Pupils are equal, round, and reactive to light.   Cardiovascular:      Rate and Rhythm: Normal rate and regular rhythm.      Heart sounds: Normal heart sounds.   Pulmonary:      Effort: Pulmonary effort is normal.      Breath sounds: Normal breath sounds.   Abdominal:      General: Bowel sounds are normal.      Palpations: Abdomen is soft.      Tenderness: There is abdominal tenderness in the epigastric area. There is no right CVA tenderness or left CVA tenderness.      Comments: Mild epigastric  tenderness present   Musculoskeletal:         General: Normal range of motion.      Cervical back: Full passive range of motion without pain, normal range of motion and neck supple.   Skin:     General: Skin is warm and dry.   Neurological:      General: No focal deficit present.      Mental Status: She is alert and oriented to person, place, and time. Mental status is at baseline.      Deep Tendon Reflexes: Reflexes are normal and symmetric.   Psychiatric:         Mood and Affect: Mood normal.         Speech: Speech normal.         Behavior: Behavior normal.         Thought Content: Thought content normal.         Judgment: Judgment normal.          ACE III MINI            Results Review:    No results found for this or any previous visit (from the past 672 hour(s)).  Procedures    Medication Review: Medications reviewed and noted    Social History     Socioeconomic History   • Marital status:    Tobacco Use   • Smoking status: Never Smoker   • Smokeless tobacco: Never Used   Vaping Use   • Vaping Use: Never used   Substance and Sexual Activity   • Alcohol use: No   • Drug use: No   • Sexual activity: Defer        Assessment/Plan:    Problem List Items Addressed This Visit        Cardiac and Vasculature    Essential hypertension    Overview     Repeat blood pressure 130/78.  Continue to monitor.  Controlled with diet and exercise.           Cardiac pacemaker in situ    Overview     Pacemaker placement since 2014.           Atrial fibrillation, chronic (HCC) - Primary    Overview     Managed by Dr. Almodovar.  Continue Eliquis 5 mg tablets twice daily.           Mixed hyperlipidemia    Overview     Continue atorvastatin 20 mg tablets daily.  Most recent lipid panel obtained on 5/2/2022 shows total cholesterol 161, triglycerides 164, HDL 50, and LDL cholesterol 77.           Relevant Medications    atorvastatin (LIPITOR) 20 MG tablet       Endocrine and Metabolic    Acquired hypothyroidism    Overview      Currently takes levothyroxine 88 mcg daily.  Last TSH checked 5/2/2022 and it was 0.43.           Relevant Medications    levothyroxine (SYNTHROID, LEVOTHROID) 88 MCG tablet    Prediabetes    Overview     Recent labs dated 5/2/2022 show slightly elevated A1c of 6.7%.  Patient will attempt to cut back on sugars in the diet.  Cut back on processed foods.  She will try to increase her walking twice weekly to 4 times a week.              Gastrointestinal Abdominal     Heartburn    Overview     Occasional heartburn.  Takes Tums as needed.  Discussed trial of Pepcid as alternative.                  Patient Instructions   BMI for Adults  What is BMI?  Body mass index (BMI) is a number that is calculated from a person's weight and height. BMI can help estimate how much of a person's weight is composed of fat. BMI does not measure body fat directly. Rather, it is an alternative to procedures that directly measure body fat, which can be difficult and expensive.  BMI can help identify people who may be at higher risk for certain medical problems.  What are BMI measurements used for?  BMI is used as a screening tool to identify possible weight problems. It helps determine whether a person is obese, overweight, a healthy weight, or underweight.  BMI is useful for:  · Identifying a weight problem that may be related to a medical condition or may increase the risk for medical problems.  · Promoting changes, such as changes in diet and exercise, to help reach a healthy weight. BMI screening can be repeated to see if these changes are working.  How is BMI calculated?  BMI involves measuring your weight in relation to your height. Both height and weight are measured, and the BMI is calculated from those numbers. This can be done either in English (U[place] or metric measurements. Note that charts and online BMI calculators are available to help you find your BMI quickly and easily without having to do these calculations yourself.  To  "calculate your BMI in English (U.S[place] measurements:    1. Measure your weight in pounds (lb).  2. Multiply the number of pounds by 703.  ? For example, for a person who weighs 180 lb, multiply that number by 703, which equals 126,540.  3. Measure your height in inches. Then multiply that number by itself to get a measurement called \"inches squared.\"  ? For example, for a person who is 70 inches tall, the \"inches squared\" measurement is 70 inches x 70 inches, which equals 4,900 inches squared.  4. Divide the total from step 2 (number of lb x 703) by the total from step 3 (inches squared): 126,540 ÷ 4,900 = 25.8. This is your BMI.    To calculate your BMI in metric measurements:  1. Measure your weight in kilograms (kg).  2. Measure your height in meters (m). Then multiply that number by itself to get a measurement called \"meters squared.\"  ? For example, for a person who is 1.75 m tall, the \"meters squared\" measurement is 1.75 m x 1.75 m, which is equal to 3.1 meters squared.  3. Divide the number of kilograms (your weight) by the meters squared number. In this example: 70 ÷ 3.1 = 22.6. This is your BMI.  What do the results mean?  BMI charts are used to identify whether you are underweight, normal weight, overweight, or obese. The following guidelines will be used:  · Underweight: BMI less than 18.5.  · Normal weight: BMI between 18.5 and 24.9.  · Overweight: BMI between 25 and 29.9.  · Obese: BMI of 30 or above.  Keep these notes in mind:  · Weight includes both fat and muscle, so someone with a muscular build, such as an athlete, may have a BMI that is higher than 24.9. In cases like these, BMI is not an accurate measure of body fat.  · To determine if excess body fat is the cause of a BMI of 25 or higher, further assessments may need to be done by a health care provider.  · BMI is usually interpreted in the same way for men and women.  Where to find more information  For more information about BMI, including tools " to quickly calculate your BMI, go to these websites:  · Centers for Disease Control and Prevention: www.cdc.gov  · American Heart Association: www.heart.org  · National Heart, Lung, and [employer]: www.nhlbi.nih.gov  Summary  · Body mass index (BMI) is a number that is calculated from a person's weight and height.  · BMI may help estimate how much of a person's weight is composed of fat. BMI can help identify those who may be at higher risk for certain medical problems.  · BMI can be measured using English measurements or metric measurements.  · BMI charts are used to identify whether you are underweight, normal weight, overweight, or obese.  This information is not intended to replace advice given to you by your health care provider. Make sure you discuss any questions you have with your health care provider.  Document Revised: 09/09/2020 Document Reviewed: 07/17/2020  [employer] Patient Education © 2021 [employer]    Return in about 3 months (around 8/27/2022) for MEDICARE WELLNESS, ROUTINE PHYSICAL.       Plan of care reviewed with patient at the conclusion of today's visit. Education was provided regarding diagnosis, management, and any prescribed or recommended OTC medications.Patient verbalizes understanding of and agreement with management plan.         I spent 37 minutes caring for Carli on this date of service. This time includes time spent by me in the following activities:preparing for the visit, reviewing tests, obtaining and/or reviewing a separately obtained history, performing a medically appropriate examination and/or evaluation , counseling and educating the patient/family/caregiver, ordering medications, tests, or procedures, referring and communicating with other health care professionals , documenting information in the medical record and independently interpreting results and communicating that information with the patient/family/caregiver    Zamzam Amador PA-C      Note: Part of  this note may be an electronic transcription/translation of spoken language to printed text using the Dragon Dictation system.    Transcribed from ambient dictation for Zamzam Amador PA-C by Maryse Villeda  05/27/22   16:22 EDT    Patient verbalized consent to the visit recording.

## 2022-05-27 NOTE — PATIENT INSTRUCTIONS

## 2022-08-16 RX ORDER — LEVOTHYROXINE SODIUM 88 UG/1
88 TABLET ORAL DAILY
Qty: 30 TABLET | Refills: 5 | Status: SHIPPED | OUTPATIENT
Start: 2022-08-16 | End: 2022-11-21 | Stop reason: SDUPTHER

## 2022-08-22 ENCOUNTER — TELEPHONE (OUTPATIENT)
Dept: INTERNAL MEDICINE | Facility: CLINIC | Age: 87
End: 2022-08-22

## 2022-08-22 DIAGNOSIS — E78.2 MIXED HYPERLIPIDEMIA: ICD-10-CM

## 2022-08-22 DIAGNOSIS — R73.03 PREDIABETES: ICD-10-CM

## 2022-08-22 DIAGNOSIS — I48.20 ATRIAL FIBRILLATION, CHRONIC: Primary | ICD-10-CM

## 2022-08-22 DIAGNOSIS — E03.9 ACQUIRED HYPOTHYROIDISM: ICD-10-CM

## 2022-08-22 DIAGNOSIS — I10 ESSENTIAL HYPERTENSION: ICD-10-CM

## 2022-08-22 NOTE — TELEPHONE ENCOUNTER
Caller: Carli Carver    Relationship: Self    Best call back number: [phone]    What orders are you requesting (i.e. lab or imaging): BLOOD WORK FOR 9/1/2022 APPT    In what timeframe would the patient need to come in: AS SOON AS POSSIBLE    Where will you receive your lab/imaging services: IN OFFICE    Additional notes: PLEASE CALL PATIENT WHEN THESE ORDERS HAVE BEEN GENERATED SO SHE KNOWS WHEN SHE CAN COME IN FOR THAT PRE-APPOINTMENT BLOOD WORK

## 2022-08-23 ENCOUNTER — LAB (OUTPATIENT)
Dept: LAB | Facility: HOSPITAL | Age: 87
End: 2022-08-23

## 2022-08-23 DIAGNOSIS — E78.2 MIXED HYPERLIPIDEMIA: ICD-10-CM

## 2022-08-23 DIAGNOSIS — I10 ESSENTIAL HYPERTENSION: ICD-10-CM

## 2022-08-23 DIAGNOSIS — R73.03 PREDIABETES: ICD-10-CM

## 2022-08-23 DIAGNOSIS — E03.9 ACQUIRED HYPOTHYROIDISM: ICD-10-CM

## 2022-08-23 LAB
ALBUMIN SERPL-MCNC: 5 G/DL (ref 3.5–5.2)
ALBUMIN/GLOB SERPL: 2.3 G/DL
ALP SERPL-CCNC: 103 U/L (ref 39–117)
ALT SERPL W P-5'-P-CCNC: 16 U/L (ref 1–33)
ANION GAP SERPL CALCULATED.3IONS-SCNC: 12 MMOL/L (ref 5–15)
AST SERPL-CCNC: 19 U/L (ref 1–32)
BASOPHILS # BLD AUTO: 0.02 10*3/MM3 (ref 0–0.2)
BASOPHILS NFR BLD AUTO: 0.3 % (ref 0–1.5)
BILIRUB SERPL-MCNC: 2.1 MG/DL (ref 0–1.2)
BUN SERPL-MCNC: 17 MG/DL (ref 8–23)
BUN/CREAT SERPL: 18.9 (ref 7–25)
CALCIUM SPEC-SCNC: 10.5 MG/DL (ref 8.6–10.5)
CHLORIDE SERPL-SCNC: 105 MMOL/L (ref 98–107)
CHOLEST SERPL-MCNC: 159 MG/DL (ref 0–200)
CO2 SERPL-SCNC: 26 MMOL/L (ref 22–29)
CREAT SERPL-MCNC: 0.9 MG/DL (ref 0.57–1)
DEPRECATED RDW RBC AUTO: 38.1 FL (ref 37–54)
EGFRCR SERPLBLD CKD-EPI 2021: 62.8 ML/MIN/1.73
EOSINOPHIL # BLD AUTO: 0.11 10*3/MM3 (ref 0–0.4)
EOSINOPHIL NFR BLD AUTO: 1.9 % (ref 0.3–6.2)
ERYTHROCYTE [DISTWIDTH] IN BLOOD BY AUTOMATED COUNT: 11.9 % (ref 12.3–15.4)
GLOBULIN UR ELPH-MCNC: 2.2 GM/DL
GLUCOSE SERPL-MCNC: 129 MG/DL (ref 65–99)
HBA1C MFR BLD: 6.6 % (ref 4.8–5.6)
HCT VFR BLD AUTO: 43.4 % (ref 34–46.6)
HDLC SERPL-MCNC: 56 MG/DL (ref 40–60)
HGB BLD-MCNC: 15.3 G/DL (ref 12–15.9)
IMM GRANULOCYTES # BLD AUTO: 0.02 10*3/MM3 (ref 0–0.05)
IMM GRANULOCYTES NFR BLD AUTO: 0.3 % (ref 0–0.5)
LDLC SERPL CALC-MCNC: 81 MG/DL (ref 0–100)
LDLC/HDLC SERPL: 1.4 {RATIO}
LYMPHOCYTES # BLD AUTO: 2.12 10*3/MM3 (ref 0.7–3.1)
LYMPHOCYTES NFR BLD AUTO: 36.7 % (ref 19.6–45.3)
MCH RBC QN AUTO: 31.9 PG (ref 26.6–33)
MCHC RBC AUTO-ENTMCNC: 35.3 G/DL (ref 31.5–35.7)
MCV RBC AUTO: 90.6 FL (ref 79–97)
MONOCYTES # BLD AUTO: 0.54 10*3/MM3 (ref 0.1–0.9)
MONOCYTES NFR BLD AUTO: 9.3 % (ref 5–12)
NEUTROPHILS NFR BLD AUTO: 2.97 10*3/MM3 (ref 1.7–7)
NEUTROPHILS NFR BLD AUTO: 51.5 % (ref 42.7–76)
NRBC BLD AUTO-RTO: 0 /100 WBC (ref 0–0.2)
PLATELET # BLD AUTO: 203 10*3/MM3 (ref 140–450)
PMV BLD AUTO: 10 FL (ref 6–12)
POTASSIUM SERPL-SCNC: 4.5 MMOL/L (ref 3.5–5.2)
PROT SERPL-MCNC: 7.2 G/DL (ref 6–8.5)
RBC # BLD AUTO: 4.79 10*6/MM3 (ref 3.77–5.28)
SODIUM SERPL-SCNC: 143 MMOL/L (ref 136–145)
T3FREE SERPL-MCNC: 3.34 PG/ML (ref 2–4.4)
T4 FREE SERPL-MCNC: 1.59 NG/DL (ref 0.93–1.7)
TRIGL SERPL-MCNC: 123 MG/DL (ref 0–150)
TSH SERPL DL<=0.05 MIU/L-ACNC: 0.5 UIU/ML (ref 0.27–4.2)
VLDLC SERPL-MCNC: 22 MG/DL (ref 5–40)
WBC NRBC COR # BLD: 5.78 10*3/MM3 (ref 3.4–10.8)

## 2022-08-23 PROCEDURE — 80061 LIPID PANEL: CPT

## 2022-08-23 PROCEDURE — 84439 ASSAY OF FREE THYROXINE: CPT

## 2022-08-23 PROCEDURE — 84443 ASSAY THYROID STIM HORMONE: CPT

## 2022-08-23 PROCEDURE — 84481 FREE ASSAY (FT-3): CPT

## 2022-08-23 PROCEDURE — 83036 HEMOGLOBIN GLYCOSYLATED A1C: CPT

## 2022-08-23 PROCEDURE — 80053 COMPREHEN METABOLIC PANEL: CPT

## 2022-08-23 PROCEDURE — 85025 COMPLETE CBC W/AUTO DIFF WBC: CPT

## 2022-09-01 ENCOUNTER — HOSPITAL ENCOUNTER (OUTPATIENT)
Dept: GENERAL RADIOLOGY | Facility: HOSPITAL | Age: 87
Discharge: HOME | End: 2022-09-01
Admitting: PHYSICIAN ASSISTANT

## 2022-09-01 ENCOUNTER — OFFICE VISIT (OUTPATIENT)
Dept: INTERNAL MEDICINE | Facility: CLINIC | Age: 87
End: 2022-09-01

## 2022-09-01 VITALS
WEIGHT: 133 LBS | TEMPERATURE: 97.7 F | DIASTOLIC BLOOD PRESSURE: 74 MMHG | HEIGHT: 62 IN | SYSTOLIC BLOOD PRESSURE: 126 MMHG | BODY MASS INDEX: 24.48 KG/M2 | HEART RATE: 67 BPM | OXYGEN SATURATION: 96 %

## 2022-09-01 DIAGNOSIS — I48.20 ATRIAL FIBRILLATION, CHRONIC: ICD-10-CM

## 2022-09-01 DIAGNOSIS — I10 ESSENTIAL HYPERTENSION: ICD-10-CM

## 2022-09-01 DIAGNOSIS — M54.2 CERVICALGIA: ICD-10-CM

## 2022-09-01 DIAGNOSIS — E78.2 MIXED HYPERLIPIDEMIA: ICD-10-CM

## 2022-09-01 DIAGNOSIS — E03.9 ACQUIRED HYPOTHYROIDISM: ICD-10-CM

## 2022-09-01 DIAGNOSIS — Z00.00 MEDICARE ANNUAL WELLNESS VISIT, SUBSEQUENT: Primary | ICD-10-CM

## 2022-09-01 PROCEDURE — 1170F FXNL STATUS ASSESSED: CPT | Performed by: PHYSICIAN ASSISTANT

## 2022-09-01 PROCEDURE — 1159F MED LIST DOCD IN RCRD: CPT | Performed by: PHYSICIAN ASSISTANT

## 2022-09-01 PROCEDURE — 99397 PER PM REEVAL EST PAT 65+ YR: CPT | Performed by: PHYSICIAN ASSISTANT

## 2022-09-01 PROCEDURE — 72040 X-RAY EXAM NECK SPINE 2-3 VW: CPT

## 2022-09-01 PROCEDURE — G0439 PPPS, SUBSEQ VISIT: HCPCS | Performed by: PHYSICIAN ASSISTANT

## 2022-09-01 PROCEDURE — 1126F AMNT PAIN NOTED NONE PRSNT: CPT | Performed by: PHYSICIAN ASSISTANT

## 2022-09-01 NOTE — PROGRESS NOTES
QUICK REFERENCE INFORMATION:  The ABCs of the Annual Wellness Visit    Annual Wellness visit    HEALTH RISK ASSESSMENT    1935    Recent History  No hospitalization(s) within the last year..        Current Medical Providers:  Patient Care Team:  Zamzam Amador PA-C as PCP - General (Physician Assistant)  Eric Almodovar DO as Consulting Physician (Cardiology)  SALBADOR Nicole Jr., MD as Consulting Physician (Ophthalmology)  Lida Lin APRN as Nurse Practitioner (Family Medicine)        Smoking Status:  Social History     Tobacco Use   Smoking Status Never Smoker   Smokeless Tobacco Never Used       Alcohol Consumption:  Social History     Substance and Sexual Activity   Alcohol Use No       Depression Screen:   PHQ-2/PHQ-9 Depression Screening 9/1/2022   Little Interest or Pleasure in Doing Things 0-->not at all   Feeling Down, Depressed or Hopeless 0-->not at all   PHQ-9: Brief Depression Severity Measure Score 0       Health Habits:              Recent Lab Results:  CMP:  Lab Results   Component Value Date    BUN 17 08/23/2022    CREATININE 0.90 08/23/2022    EGFRIFNONA 63 07/12/2020    BCR 18.9 08/23/2022     08/23/2022    K 4.5 08/23/2022    CO2 26.0 08/23/2022    CALCIUM 10.5 08/23/2022    ALBUMIN 5.00 08/23/2022    BILITOT 2.1 (H) 08/23/2022    ALKPHOS 103 08/23/2022    AST 19 08/23/2022    ALT 16 08/23/2022     Lipid Panel:  Lab Results   Component Value Date    CHOL 159 08/23/2022    TRIG 123 08/23/2022    HDL 56 08/23/2022    VLDL 22 08/23/2022    LDLHDL 1.40 08/23/2022     HbA1c:  Lab Results   Component Value Date    HGBA1C 6.60 (H) 08/23/2022           Age-appropriate Screening Schedule:  Refer to the list below for future screening recommendations based on patient's age, sex and/or medical conditions. Orders for these recommended tests are listed in the plan section. The patient has been provided with a written plan.    Health Maintenance   Topic Date Due   • DXA  "SCAN  Never done   • TDAP/TD VACCINES (1 - Tdap) Never done   • ZOSTER VACCINE (1 of 2) Never done   • INFLUENZA VACCINE  10/01/2022   • LIPID PANEL  08/23/2023        Subjective   History of Present Illness    Carli Carver is a 87 y.o. female who presents for an Annual Wellness Visit.    Carli Carver is an 87-year-old female who presents today for routine Medicare wellness visit. She lives with her .    She is accompanied by her daughter.    Her previous A1c reading was 6.7 percent. Her current A1c is 6.6 percent.     She has been visiting her cardiologist after having a pacemaker installed in 2014. She has been taking Eliquis twice daily. She denies any current heart issues. She last visited her cardiologist ion 02/28/2022. Her next visit with her cardiologist is scheduled for 03/06/2023. She notes Dr. Nicole performed her cataract surgery.     She intermittently performs breast exams. She denies any change in her bowel movements. She denies any recent falls.   She exercises by ambulating approximately 3 times weekly for approximately 1-2 miles.      She admits experiencing intermittent sharp pains in her neck when she moves it for approximately 3-4 months. She denies any injuries or any heavy lifting at home. She denies any numbness or tingling in her bilateral arms or hands. She denies any related dizziness.     She admits intermittent stomach pain \"on the outside\".     The following portions of the patient's history were reviewed and updated as appropriate: allergies, current medications, past family history, past medical history, past social history, past surgical history and problem list.    Outpatient Medications Prior to Visit   Medication Sig Dispense Refill   • apixaban (Eliquis) 5 MG tablet tablet Take 1 tablet by mouth 2 (Two) Times a Day. 60 tablet 6   • atorvastatin (LIPITOR) 20 MG tablet Take 20 mg by mouth Every Night.     • BIOTIN PO Take 5,000 mcg by mouth Daily.     • " Calcium-Magnesium-Vitamin D (CALCIUM 1200+D3 PO) Take 1 tablet by mouth Daily. 1000 units d3     • docusate sodium (COLACE) 100 MG capsule Take 100 mg by mouth Daily As Needed.     • levothyroxine (SYNTHROID, LEVOTHROID) 88 MCG tablet Take 1 tablet by mouth Daily. 30 tablet 5   • Magnesium 250 MG tablet Take 500 mg by mouth Every Other Day.     • Multiple Vitamins-Minerals (MULTIVITAMIN ADULT PO) Take 1 tablet by mouth Daily.       No facility-administered medications prior to visit.       Patient Active Problem List   Diagnosis   • Sick sinus syndrome (HCC)   • Essential hypertension   • Cardiac pacemaker in situ   • Complete heart block (HCC)   • Atrial fibrillation, chronic (HCC)   • Chronic anticoagulation   • Mixed hyperlipidemia   • Heartburn   • Acquired hypothyroidism   • Prediabetes   • Medicare annual wellness visit, subsequent   • Routine medical exam       Advance Care Planning:  ACP discussion was held with the patient during this visit. Patient has an advance directive in EMR which is still valid.     Identification of Risk Factors:  Risk factors include: Advance Directive Discussion.    Review of Systems   Constitutional: Negative for chills, fatigue and fever.   HENT: Negative for congestion, ear pain and sinus pressure.    Respiratory: Negative for cough, chest tightness, shortness of breath and wheezing.    Cardiovascular: Negative for chest pain and palpitations.   Gastrointestinal: Negative for abdominal pain, blood in stool and constipation.   Endocrine: Negative for cold intolerance and heat intolerance.   Musculoskeletal: Positive for neck pain.   Skin: Negative for color change.   Allergic/Immunologic: Negative for environmental allergies.   Neurological: Negative for dizziness, speech difficulty and headaches.   Psychiatric/Behavioral: Negative for confusion. The patient is not nervous/anxious.        Compared to one year ago, the patient feels her physical health is the same.  Compared to  "one year ago, the patient feels her mental health is the same.    Objective     Physical Exam  Vitals and nursing note reviewed.   Constitutional:       Appearance: Normal appearance. She is well-developed.   HENT:      Head: Normocephalic and atraumatic.      Right Ear: Hearing, tympanic membrane, ear canal and external ear normal.      Left Ear: Hearing, tympanic membrane, ear canal and external ear normal.      Nose: Nose normal.      Mouth/Throat:      Pharynx: Uvula midline.   Eyes:      General: Lids are normal.      Conjunctiva/sclera: Conjunctivae normal.      Pupils: Pupils are equal, round, and reactive to light.   Cardiovascular:      Rate and Rhythm: Normal rate and regular rhythm.      Heart sounds: Normal heart sounds.   Pulmonary:      Effort: Pulmonary effort is normal.      Breath sounds: Normal breath sounds.   Abdominal:      General: Bowel sounds are normal.      Palpations: Abdomen is soft.   Musculoskeletal:         General: Normal range of motion.      Cervical back: Full passive range of motion without pain, normal range of motion and neck supple.   Feet:      Right foot:      Protective Sensation: 10 sites tested. 10 sites sensed.      Skin integrity: Callus present.      Left foot:      Protective Sensation: 10 sites tested. 10 sites sensed.      Skin integrity: Callus present.   Skin:     General: Skin is warm and dry.   Neurological:      Mental Status: She is alert and oriented to person, place, and time.      Deep Tendon Reflexes: Reflexes are normal and symmetric.   Psychiatric:         Speech: Speech normal.         Behavior: Behavior normal.         Thought Content: Thought content normal.         Judgment: Judgment normal.              Vitals:    09/01/22 0947   BP: 126/74   BP Location: Left arm   Patient Position: Sitting   Cuff Size: Adult   Pulse: 67   Temp: 97.7 °F (36.5 °C)   TempSrc: Temporal   SpO2: 96%   Weight: 60.3 kg (133 lb)   Height: 157.5 cm (62.01\")   PainSc: 0-No " pain       BMI is within normal parameters. No other follow-up for BMI required.      CMP:  Lab Results   Component Value Date    BUN 17 08/23/2022    CREATININE 0.90 08/23/2022    EGFRIFNONA 63 07/12/2020    BCR 18.9 08/23/2022     08/23/2022    K 4.5 08/23/2022    CO2 26.0 08/23/2022    CALCIUM 10.5 08/23/2022    ALBUMIN 5.00 08/23/2022    BILITOT 2.1 (H) 08/23/2022    ALKPHOS 103 08/23/2022    AST 19 08/23/2022    ALT 16 08/23/2022     HbA1c:  Lab Results   Component Value Date    HGBA1C 6.60 (H) 08/23/2022    HGBA1C 6.0 08/06/2014     Microalbumin:  No results found for: MICROALBUR, POCMALB, POCCREAT  Lipid Panel  Lab Results   Component Value Date    CHOL 159 08/23/2022    TRIG 123 08/23/2022    HDL 56 08/23/2022    LDL 81 08/23/2022    AST 19 08/23/2022    ALT 16 08/23/2022       Assessment & Plan   Patient Self-Management and Personalized Health Advice  The patient has been provided with information about: mental health concerns and preventive services including:   · Annual Wellness Visit (AWV).    Problem List Items Addressed This Visit        Cardiac and Vasculature    Essential hypertension    Overview     Repeat blood pressure 130/78.  Continue to monitor.  Controlled with diet and exercise.         Atrial fibrillation, chronic (HCC)    Overview     Managed by Dr. Almodovar.  Continue Eliquis 5 mg tablets twice daily.         Mixed hyperlipidemia    Overview     Continue atorvastatin 20 mg tablets daily.  Most recent lipid panel obtained on 5/2/2022 shows total cholesterol 161, triglycerides 164, HDL 50, and LDL cholesterol 77.         Relevant Medications    atorvastatin (LIPITOR) 20 MG tablet       Endocrine and Metabolic    Acquired hypothyroidism    Overview     Currently takes levothyroxine 88 mcg daily.  Last TSH checked 5/2/2022 and it was 0.43.         Relevant Medications    levothyroxine (SYNTHROID, LEVOTHROID) 88 MCG tablet       Health Encounters    Medicare annual wellness visit, subsequent  "- Primary    Overview     Patient is current for age recommended screenings for mammography, bone density, and colonoscopy.  She declines any immunizations.  Fasting labs reviewed today.  There is an increase in A1c.  She will cut back on processed foods and sweets.           Other Visit Diagnoses     Cervicalgia        Relevant Orders    XR Spine Cervical 2 or 3 View (Completed)         In regard to health maintenance, the patient was advised to monitor her diet. She was also advised to perform regular breast exams and bilateral foot exams after showering. She was advised to stretch in the mornings.     Patient Instructions   Critical care medicine: Principles of diagnosis and management in the adult (4th ed., pp. 8754-3970). [employer].\"> [employer] anesthesia (8th ed., pp. 232-250). [employer].\">   Advance Directive    Advance directives are legal documents that allow you to make decisions about your health care and medical treatment in case you become unable to communicate for yourself. Advance directives let your wishes be known to family, friends, and health care providers.  Discussing and writing advance directives should happen over time rather than all at once. Advance directives can be changed and updated at any time. There are different types of advance directives, such as:  · Medical power of .  · Living will.  · Do not resuscitate (DNR) order or do not attempt resuscitation (DNAR) order.  Health care proxy and medical power of   A health care proxy is also called a health care agent. This person is appointed to make medical decisions for you when you are unable to make decisions for yourself. Generally, people ask a trusted friend or family member to act as their proxy and represent their preferences. Make sure you have an agreement with your trusted person to act as your proxy. A proxy may have to make a medical decision on your behalf if your wishes are not known.  A medical power of " , also called a durable power of  for health care, is a legal document that names your health care proxy. Depending on the laws in your state, the document may need to be:  · Signed.  · Notarized.  · Dated.  · Copied.  · Witnessed.  · Incorporated into your medical record.  You may also want to appoint a trusted person to manage your money in the event you are unable to do so. This is called a durable power of  for finances. It is a separate legal document from the durable power of  for health care. You may choose your health care proxy or someone different to act as your agent in money matters.  If you do not appoint a proxy, or there is a concern that the proxy is not acting in your best interest, a court may appoint a guardian to act on your behalf.  Living will  A living will is a set of instructions that state your wishes about medical care when you cannot express them yourself. Health care providers should keep a copy of your living will in your medical record. You may want to give a copy to family members or friends. To alert caregivers in case of an emergency, you can place a card in your wallet to let them know that you have a living will and where they can find it. A living will is used if you become:  · Terminally ill.  · Disabled.  · Unable to communicate or make decisions.  The following decisions should be included in your living will:  · To use or not to use life support equipment, such as dialysis machines and breathing machines (ventilators).  · Whether you want a DNR or DNAR order. This tells health care providers not to use cardiopulmonary resuscitation (CPR) if breathing or heartbeat stops.  · To use or not to use tube feeding.  · To be given or not to be given food and fluids.  · Whether you want comfort (palliative) care when the goal becomes comfort rather than a cure.  · Whether you want to donate your organs and tissues.  A living will does not give  instructions for distributing your money and property if you should pass away.  DNR or DNAR  A DNR or DNAR order is a request not to have CPR in the event that your heart stops beating or you stop breathing. If a DNR or DNAR order has not been made and shared, a health care provider will try to help any patient whose heart has stopped or who has stopped breathing. If you plan to have surgery, talk with your health care provider about how your DNR or DNAR order will be followed if problems occur.  What if I do not have an advance directive?  Some states assign family decision makers to act on your behalf if you do not have an advance directive. Each state has its own laws about advance directives. You may want to check with your health care provider, , or state representative about the laws in your state.  Summary  · Advance directives are legal documents that allow you to make decisions about your health care and medical treatment in case you become unable to communicate for yourself.  · The process of discussing and writing advance directives should happen over time. You can change and update advance directives at any time.  · Advance directives may include a medical power of , a living will, and a DNR or DNAR order.  This information is not intended to replace advice given to you by your health care provider. Make sure you discuss any questions you have with your health care provider.  Document Revised: 09/21/2021 Document Reviewed: 09/21/2021  [employer] Patient Education © 2021 [employer]        Outpatient Encounter Medications as of 9/1/2022   Medication Sig Dispense Refill   • apixaban (Eliquis) 5 MG tablet tablet Take 1 tablet by mouth 2 (Two) Times a Day. 60 tablet 6   • atorvastatin (LIPITOR) 20 MG tablet Take 20 mg by mouth Every Night.     • BIOTIN PO Take 5,000 mcg by mouth Daily.     • Calcium-Magnesium-Vitamin D (CALCIUM 1200+D3 PO) Take 1 tablet by mouth Daily. 1000 units d3     •  docusate sodium (COLACE) 100 MG capsule Take 100 mg by mouth Daily As Needed.     • levothyroxine (SYNTHROID, LEVOTHROID) 88 MCG tablet Take 1 tablet by mouth Daily. 30 tablet 5   • Magnesium 250 MG tablet Take 500 mg by mouth Every Other Day.     • Multiple Vitamins-Minerals (MULTIVITAMIN ADULT PO) Take 1 tablet by mouth Daily.       No facility-administered encounter medications on file as of 9/1/2022.       Reviewed use of high risk medication in the elderly: yes  Reviewed for potential of harmful drug interactions in the elderly: yes    Follow Up:  Return in about 6 months (around 3/1/2023).     An After Visit Summary and PPPS with all of these plans were given to the patient.         I spent 35 minutes caring for Carli on this date of service. This time includes time spent by me in the following activities:preparing for the visit, reviewing tests, obtaining and/or reviewing a separately obtained history, performing a medically appropriate examination and/or evaluation , counseling and educating the patient/family/caregiver, ordering medications, tests, or procedures, referring and communicating with other health care professionals  and documenting information in the medical record    Zamzam Amador PA-C    Note: Part of this note may be an electronic transcription/translation of spoken language to printed text using the Dragon Dictation System.     Transcribed from ambient dictation for Zamzam Amador PA-C by JACKSON ZALDIVAR.  09/01/22   12:41 EDT    Patient verbalized consent to the visit recording.  I have personally performed the services described in this document as transcribed by the above individual, and it is both accurate and complete.  Zamzam Amador PA-C  9/3/2022  13:04 EDT

## 2022-09-01 NOTE — PATIENT INSTRUCTIONS
"Critical care medicine: Principles of diagnosis and management in the adult (4th ed., pp. 4431-6522). Mora.\"> [employer] (8th ed., pp. 232-250). Mora.\">   Advance Directive    Advance directives are legal documents that allow you to make decisions about your health care and medical treatment in case you become unable to communicate for yourself. Advance directives let your wishes be known to family, friends, and health care providers.  Discussing and writing advance directives should happen over time rather than all at once. Advance directives can be changed and updated at any time. There are different types of advance directives, such as:  Medical power of .  Living will.  Do not resuscitate (DNR) order or do not attempt resuscitation (DNAR) order.  Health care proxy and medical power of   A health care proxy is also called a health care agent. This person is appointed to make medical decisions for you when you are unable to make decisions for yourself. Generally, people ask a trusted friend or family member to act as their proxy and represent their preferences. Make sure you have an agreement with your trusted person to act as your proxy. A proxy may have to make a medical decision on your behalf if your wishes are not known.  A medical power of , also called a durable power of  for health care, is a legal document that names your health care proxy. Depending on the laws in your state, the document may need to be:  Signed.  Notarized.  Dated.  Copied.  Witnessed.  Incorporated into your medical record.  You may also want to appoint a trusted person to manage your money in the event you are unable to do so. This is called a durable power of  for finances. It is a separate legal document from the durable power of  for health care. You may choose your health care proxy or someone different to act as your agent in money matters.  If you do not appoint a " proxy, or there is a concern that the proxy is not acting in your best interest, a court may appoint a guardian to act on your behalf.  Living will  A living will is a set of instructions that state your wishes about medical care when you cannot express them yourself. Health care providers should keep a copy of your living will in your medical record. You may want to give a copy to family members or friends. To alert caregivers in case of an emergency, you can place a card in your wallet to let them know that you have a living will and where they can find it. A living will is used if you become:  Terminally ill.  Disabled.  Unable to communicate or make decisions.  The following decisions should be included in your living will:  To use or not to use life support equipment, such as dialysis machines and breathing machines (ventilators).  Whether you want a DNR or DNAR order. This tells health care providers not to use cardiopulmonary resuscitation (CPR) if breathing or heartbeat stops.  To use or not to use tube feeding.  To be given or not to be given food and fluids.  Whether you want comfort (palliative) care when the goal becomes comfort rather than a cure.  Whether you want to donate your organs and tissues.  A living will does not give instructions for distributing your money and property if you should pass away.  DNR or DNAR  A DNR or DNAR order is a request not to have CPR in the event that your heart stops beating or you stop breathing. If a DNR or DNAR order has not been made and shared, a health care provider will try to help any patient whose heart has stopped or who has stopped breathing. If you plan to have surgery, talk with your health care provider about how your DNR or DNAR order will be followed if problems occur.  What if I do not have an advance directive?  Some states assign family decision makers to act on your behalf if you do not have an advance directive. Each state has its own laws about  advance directives. You may want to check with your health care provider, , or state representative about the laws in your state.  Summary  Advance directives are legal documents that allow you to make decisions about your health care and medical treatment in case you become unable to communicate for yourself.  The process of discussing and writing advance directives should happen over time. You can change and update advance directives at any time.  Advance directives may include a medical power of , a living will, and a DNR or DNAR order.  This information is not intended to replace advice given to you by your health care provider. Make sure you discuss any questions you have with your health care provider.  Document Revised: 2021 Document Reviewed: 2021  Elsevier Patient Education ©  Elsevier Inc.    Medicare Wellness  Personal Prevention Plan of Service     Date of Office Visit:    Encounter Provider:  Zamzam Amador PA-C  Place of Service:  [facility] INTER[facility]  Patient Name: Carli Carver  :  1935    As part of the Medicare Wellness portion of your visit today, we are providing you with this personalized preventive plan of services (PPPS). This plan is based upon recommendations of the [place] Preventive Services Task Force (USPSTF) and the Advisory Committee on Immunization Practices (ACIP).    This lists the preventive care services that should be considered, and provides dates of when you are due. Items listed as completed are up-to-date and do not require any further intervention.    Health Maintenance   Topic Date Due    DXA SCAN  Never done    COVID-19 Vaccine (1) Never done    Pneumococcal Vaccine 65+ (1 - PCV) 1941    TDAP/TD VACCINES (1 - Tdap) Never done    ZOSTER VACCINE (1 of 2) Never done    INFLUENZA VACCINE  10/01/2022    LIPID PANEL  2023    ANNUAL WELLNESS VISIT  2023       Orders Placed This  Encounter   Procedures    XR Spine Cervical 2 or 3 View     Standing Status:   Future     Number of Occurrences:   1     Standing Expiration Date:   9/1/2023     Order Specific Question:   Reason for Exam:     Answer:   neck pain       Return in about 6 months (around 3/1/2023).        Fall Prevention in the Home, Adult  Falls can cause injuries and can happen to people of all ages. There are many things you can do to make your home safe and to help prevent falls. Ask for help when making these changes.  What actions can I take to prevent falls?  General Instructions  Use good lighting in all rooms. Replace any light bulbs that burn out.  Turn on the lights in dark areas. Use night-lights.  Keep items that you use often in easy-to-reach places. Lower the shelves around your home if needed.  Set up your furniture so you have a clear path. Avoid moving your furniture around.  Do not have throw rugs or other things on the floor that can make you trip.  Avoid walking on wet floors.  If any of your floors are uneven, fix them.  Add color or contrast paint or tape to clearly ronal and help you see:  Grab bars or handrails.  First and last steps of staircases.  Where the edge of each step is.  If you use a stepladder:  Make sure that it is fully opened. Do not climb a closed stepladder.  Make sure the sides of the stepladder are locked in place.  Ask someone to hold the stepladder while you use it.  Know where your pets are when moving through your home.  What can I do in the bathroom?         Keep the floor dry. Clean up any water on the floor right away.  Remove soap buildup in the tub or shower.  Use nonskid mats or decals on the floor of the tub or shower.  Attach bath mats securely with double-sided, nonslip rug tape.  If you need to sit down in the shower, use a plastic, nonslip stool.  Install grab bars by the toilet and in the tub and shower. Do not use towel bars as grab bars.  What can I do in the bedroom?  Make  sure that you have a light by your bed that is easy to reach.  Do not use any sheets or blankets for your bed that hang to the floor.  Have a firm chair with side arms that you can use for support when you get dressed.  What can I do in the kitchen?  Clean up any spills right away.  If you need to reach something above you, use a step stool with a grab bar.  Keep electrical cords out of the way.  Do not use floor polish or wax that makes floors slippery.  What can I do with my stairs?  Do not leave any items on the stairs.  Make sure that you have a light switch at the top and the bottom of the stairs.  Make sure that there are handrails on both sides of the stairs. Fix handrails that are broken or loose.  Install nonslip stair treads on all your stairs.  Avoid having throw rugs at the top or bottom of the stairs.  Choose a carpet that does not hide the edge of the steps on the stairs.  Check carpeting to make sure that it is firmly attached to the stairs. Fix carpet that is loose or worn.  What can I do on the outside of my home?  Use bright outdoor lighting.  Fix the edges of walkways and driveways and fix any cracks.  Remove anything that might make you trip as you walk through a door, such as a raised step or threshold.  Trim any bushes or trees on paths to your home.  Check to see if handrails are loose or broken and that both sides of all steps have handrails.  Install guardrails along the edges of any raised decks and porches.  Clear paths of anything that can make you trip, such as tools or rocks.  Have leaves, snow, or ice cleared regularly.  Use sand or salt on paths during winter.  Clean up any spills in your garage right away. This includes grease or oil spills.  What other actions can I take?  Wear shoes that:  Have a low heel. Do not wear high heels.  Have rubber bottoms.  Feel good on your feet and fit well.  Are closed at the toe. Do not wear open-toe sandals.  Use tools that help you move around if  needed. These include:  Canes.  Walkers.  Scooters.  Crutches.  Review your medicines with your doctor. Some medicines can make you feel dizzy. This can increase your chance of falling.  Ask your doctor what else you can do to help prevent falls.  Where to find more information  Centers for Disease Control and Prevention, STEADI: www.cdc.gov  National [place] on Aging: www.dominguez.nih.gov  Contact a doctor if:  You are afraid of falling at home.  You feel weak, drowsy, or dizzy at home.  You fall at home.  Summary  There are many simple things that you can do to make your home safe and to help prevent falls.  Ways to make your home safe include removing things that can make you trip and installing grab bars in the bathroom.  Ask for help when making these changes in your home.  This information is not intended to replace advice given to you by your health care provider. Make sure you discuss any questions you have with your health care provider.  Document Revised: 07/21/2021 Document Reviewed: 07/21/2021  Elsevier Patient Education © 2021 Elsevier Inc.

## 2022-09-03 PROBLEM — Z00.00 ROUTINE MEDICAL EXAM: Status: ACTIVE | Noted: 2022-09-03

## 2022-09-03 PROBLEM — Z00.00 MEDICARE ANNUAL WELLNESS VISIT, SUBSEQUENT: Status: ACTIVE | Noted: 2022-09-03

## 2022-09-15 PROCEDURE — 93296 REM INTERROG EVL PM/IDS: CPT | Performed by: INTERNAL MEDICINE

## 2022-09-15 PROCEDURE — 93294 REM INTERROG EVL PM/LDLS PM: CPT | Performed by: INTERNAL MEDICINE

## 2022-11-17 ENCOUNTER — TELEPHONE (OUTPATIENT)
Dept: CARDIOLOGY | Facility: CLINIC | Age: 87
End: 2022-11-17

## 2022-11-17 NOTE — TELEPHONE ENCOUNTER
Mrs Carver called reporting being more fatigue and wanted to send in a pacemaker reading.  Remote reading received and it was normal with no events.  Made her aware and instructed her to see PCP if she had further concerns. She verbalized understanding.

## 2022-11-21 RX ORDER — LEVOTHYROXINE SODIUM 88 UG/1
88 TABLET ORAL DAILY
Qty: 30 TABLET | Refills: 5 | Status: SHIPPED | OUTPATIENT
Start: 2022-11-21

## 2022-11-21 NOTE — TELEPHONE ENCOUNTER
Caller: Carli Carver    Relationship: Self    Best call back number: [phone]    Requested Prescriptions:   Requested Prescriptions     Pending Prescriptions Disp Refills   • levothyroxine (SYNTHROID, LEVOTHROID) 88 MCG tablet 30 tablet 5     Sig: Take 1 tablet by mouth Daily.        Pharmacy where request should be sent: [facility] [id number] [address] [phone] [facility] [phone] FX     Additional details provided by patient: PATIENT HAS TWO DAYS LEFT.    Does the patient have less than a 3 day supply:  [x] Yes  [] No    Stacey Sales Rep   11/21/22 08:53 EST

## 2022-12-01 ENCOUNTER — TELEPHONE (OUTPATIENT)
Dept: INTERNAL MEDICINE | Facility: CLINIC | Age: 87
End: 2022-12-01

## 2022-12-15 PROCEDURE — 93294 REM INTERROG EVL PM/LDLS PM: CPT | Performed by: INTERNAL MEDICINE

## 2022-12-15 PROCEDURE — 93296 REM INTERROG EVL PM/IDS: CPT | Performed by: INTERNAL MEDICINE

## 2023-01-12 RX ORDER — APIXABAN 5 MG/1
TABLET, FILM COATED ORAL
Qty: 60 TABLET | Refills: 5 | Status: SHIPPED | OUTPATIENT
Start: 2023-01-12

## 2023-01-13 RX ORDER — ATORVASTATIN CALCIUM 20 MG/1
20 TABLET, FILM COATED ORAL NIGHTLY
Qty: 90 TABLET | Refills: 1 | Status: SHIPPED | OUTPATIENT
Start: 2023-01-13

## 2023-01-13 NOTE — TELEPHONE ENCOUNTER
Caller: Mehul Carlikris Vaughan    Relationship: Self    Best call back number: [phone]    Requested Prescriptions:   Requested Prescriptions     Pending Prescriptions Disp Refills   • atorvastatin (LIPITOR) 20 MG tablet 90 tablet      Sig: Take 1 tablet by mouth Every Night.        Pharmacy where request should be sent: [facility] [id number] [facility] [address] [facility] [phone] [facility] [phone] FX     Additional details provided by patient: PATIENT HAS AT LEAST 3 DAYS    Does the patient have less than a 3 day supply:  [] Yes  [x] No    Would you like a call back once the refill request has been completed: [x] Yes [] No    If the office needs to give you a call back, can they leave a voicemail: [x] Yes [] No    Stacey Gonzalez Rep   01/13/23 10:38 EST

## 2023-03-02 ENCOUNTER — LAB (OUTPATIENT)
Dept: LAB | Facility: HOSPITAL | Age: 88
End: 2023-03-02
Payer: MEDICARE

## 2023-03-02 ENCOUNTER — OFFICE VISIT (OUTPATIENT)
Dept: INTERNAL MEDICINE | Facility: CLINIC | Age: 88
End: 2023-03-02
Payer: MEDICARE

## 2023-03-02 VITALS
SYSTOLIC BLOOD PRESSURE: 122 MMHG | HEART RATE: 60 BPM | TEMPERATURE: 97.1 F | OXYGEN SATURATION: 99 % | DIASTOLIC BLOOD PRESSURE: 64 MMHG | HEIGHT: 62 IN | WEIGHT: 128 LBS | BODY MASS INDEX: 23.55 KG/M2

## 2023-03-02 DIAGNOSIS — I10 ESSENTIAL HYPERTENSION: ICD-10-CM

## 2023-03-02 DIAGNOSIS — H61.22 IMPACTED CERUMEN OF LEFT EAR: ICD-10-CM

## 2023-03-02 DIAGNOSIS — E78.2 MIXED HYPERLIPIDEMIA: ICD-10-CM

## 2023-03-02 DIAGNOSIS — R25.2 LEG CRAMPS: ICD-10-CM

## 2023-03-02 DIAGNOSIS — R73.03 PREDIABETES: ICD-10-CM

## 2023-03-02 DIAGNOSIS — E03.9 ACQUIRED HYPOTHYROIDISM: Primary | ICD-10-CM

## 2023-03-02 DIAGNOSIS — E03.9 ACQUIRED HYPOTHYROIDISM: ICD-10-CM

## 2023-03-02 PROBLEM — H91.92 HEARING LOSS OF LEFT EAR: Status: ACTIVE | Noted: 2023-03-02

## 2023-03-02 PROBLEM — H91.92 HEARING LOSS OF LEFT EAR: Status: RESOLVED | Noted: 2023-03-02 | Resolved: 2023-03-02

## 2023-03-02 LAB
ALBUMIN SERPL-MCNC: 4.6 G/DL (ref 3.5–5.2)
ALBUMIN/GLOB SERPL: 1.5 G/DL
ALP SERPL-CCNC: 101 U/L (ref 39–117)
ALT SERPL W P-5'-P-CCNC: 22 U/L (ref 1–33)
ANION GAP SERPL CALCULATED.3IONS-SCNC: 11 MMOL/L (ref 5–15)
AST SERPL-CCNC: 21 U/L (ref 1–32)
BASOPHILS # BLD AUTO: 0.02 10*3/MM3 (ref 0–0.2)
BASOPHILS NFR BLD AUTO: 0.3 % (ref 0–1.5)
BILIRUB SERPL-MCNC: 2 MG/DL (ref 0–1.2)
BUN SERPL-MCNC: 14 MG/DL (ref 8–23)
BUN/CREAT SERPL: 17.9 (ref 7–25)
CALCIUM SPEC-SCNC: 10.2 MG/DL (ref 8.6–10.5)
CHLORIDE SERPL-SCNC: 104 MMOL/L (ref 98–107)
CHOLEST SERPL-MCNC: 138 MG/DL (ref 0–200)
CO2 SERPL-SCNC: 26 MMOL/L (ref 22–29)
CREAT SERPL-MCNC: 0.78 MG/DL (ref 0.57–1)
DEPRECATED RDW RBC AUTO: 43.6 FL (ref 37–54)
EGFRCR SERPLBLD CKD-EPI 2021: 74.1 ML/MIN/1.73
EOSINOPHIL # BLD AUTO: 0.11 10*3/MM3 (ref 0–0.4)
EOSINOPHIL NFR BLD AUTO: 1.9 % (ref 0.3–6.2)
ERYTHROCYTE [DISTWIDTH] IN BLOOD BY AUTOMATED COUNT: 12.7 % (ref 12.3–15.4)
GLOBULIN UR ELPH-MCNC: 3.1 GM/DL
GLUCOSE SERPL-MCNC: 106 MG/DL (ref 65–99)
HBA1C MFR BLD: 6.6 % (ref 4.8–5.6)
HCT VFR BLD AUTO: 43.4 % (ref 34–46.6)
HDLC SERPL-MCNC: 56 MG/DL (ref 40–60)
HGB BLD-MCNC: 15.1 G/DL (ref 12–15.9)
IMM GRANULOCYTES # BLD AUTO: 0.02 10*3/MM3 (ref 0–0.05)
IMM GRANULOCYTES NFR BLD AUTO: 0.3 % (ref 0–0.5)
LDLC SERPL CALC-MCNC: 65 MG/DL (ref 0–100)
LDLC/HDLC SERPL: 1.13 {RATIO}
LYMPHOCYTES # BLD AUTO: 1.82 10*3/MM3 (ref 0.7–3.1)
LYMPHOCYTES NFR BLD AUTO: 31.3 % (ref 19.6–45.3)
MAGNESIUM SERPL-MCNC: 2.4 MG/DL (ref 1.6–2.4)
MCH RBC QN AUTO: 33 PG (ref 26.6–33)
MCHC RBC AUTO-ENTMCNC: 34.8 G/DL (ref 31.5–35.7)
MCV RBC AUTO: 95 FL (ref 79–97)
MONOCYTES # BLD AUTO: 0.53 10*3/MM3 (ref 0.1–0.9)
MONOCYTES NFR BLD AUTO: 9.1 % (ref 5–12)
NEUTROPHILS NFR BLD AUTO: 3.32 10*3/MM3 (ref 1.7–7)
NEUTROPHILS NFR BLD AUTO: 57.1 % (ref 42.7–76)
NRBC BLD AUTO-RTO: 0 /100 WBC (ref 0–0.2)
PLATELET # BLD AUTO: 200 10*3/MM3 (ref 140–450)
PMV BLD AUTO: 9.9 FL (ref 6–12)
POTASSIUM SERPL-SCNC: 4.2 MMOL/L (ref 3.5–5.2)
PROT SERPL-MCNC: 7.7 G/DL (ref 6–8.5)
RBC # BLD AUTO: 4.57 10*6/MM3 (ref 3.77–5.28)
SODIUM SERPL-SCNC: 141 MMOL/L (ref 136–145)
T3FREE SERPL-MCNC: 3.47 PG/ML (ref 2–4.4)
T4 FREE SERPL-MCNC: 1.83 NG/DL (ref 0.93–1.7)
TRIGL SERPL-MCNC: 93 MG/DL (ref 0–150)
TSH SERPL DL<=0.05 MIU/L-ACNC: 0.42 UIU/ML (ref 0.27–4.2)
VLDLC SERPL-MCNC: 17 MG/DL (ref 5–40)
WBC NRBC COR # BLD: 5.82 10*3/MM3 (ref 3.4–10.8)

## 2023-03-02 PROCEDURE — 84443 ASSAY THYROID STIM HORMONE: CPT

## 2023-03-02 PROCEDURE — 82043 UR ALBUMIN QUANTITATIVE: CPT

## 2023-03-02 PROCEDURE — 80053 COMPREHEN METABOLIC PANEL: CPT

## 2023-03-02 PROCEDURE — 84439 ASSAY OF FREE THYROXINE: CPT

## 2023-03-02 PROCEDURE — 83036 HEMOGLOBIN GLYCOSYLATED A1C: CPT

## 2023-03-02 PROCEDURE — 36415 COLL VENOUS BLD VENIPUNCTURE: CPT

## 2023-03-02 PROCEDURE — 85025 COMPLETE CBC W/AUTO DIFF WBC: CPT

## 2023-03-02 PROCEDURE — 80061 LIPID PANEL: CPT

## 2023-03-02 PROCEDURE — 83735 ASSAY OF MAGNESIUM: CPT

## 2023-03-02 PROCEDURE — 69209 REMOVE IMPACTED EAR WAX UNI: CPT | Performed by: PHYSICIAN ASSISTANT

## 2023-03-02 PROCEDURE — 84481 FREE ASSAY (FT-3): CPT

## 2023-03-02 PROCEDURE — 99214 OFFICE O/P EST MOD 30 MIN: CPT | Performed by: PHYSICIAN ASSISTANT

## 2023-03-02 NOTE — PROGRESS NOTES
[facility], [place], operated by [facility]    Carli Carver  1935   [id number]      Patient Care Team:  Zamzam Amador PA-C as PCP - General (Physician Assistant)  Eric Almodovar DO as Consulting Physician (Cardiology)  SALBADOR Nicole Jr., MD as Consulting Physician (Ophthalmology)  Lida Lin APRN as Nurse Practitioner (Family Medicine)    Chief Complaint::   Chief Complaint   Patient presents with   • Hypertension     6 month follow up         HPI  Carli Carver is an 88-year-old female date of birth 1935 who presents today for 6-month follow-up.  Past medical history significant for  Hypertension, cardiac pacemaker, hyperlipidemia, prediabetes, hypothyroidism.  She had COVID over January.   also had COVID.  He was hospitalized on and off for 7 weeks.  Currently at home on oxygen.  She has lost approximately 5 pounds since her last appointment in September.  Has had increase in muscle cramps in legs, started after being in the hospital sitting with .  She is also noticed a decrease in balance.  Day-to-day activities include housekeeping.  May get outside to walk 2-3 times a week.  Does get out of the house with daughters to go shopping.  Cardiology appointment on Monday.  She denies chest pain, shortness of breath, palpitations, or headaches.  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      Patient Active Problem List   Diagnosis   • Sick sinus syndrome (HCC)   • Essential hypertension   • Cardiac pacemaker in situ   • Complete heart block (HCC)   • Atrial fibrillation, chronic (HCC)   • Chronic anticoagulation   • Mixed hyperlipidemia   • Heartburn   • Acquired hypothyroidism   • Prediabetes   • Medicare annual wellness visit, subsequent   • Routine medical exam   • Leg cramps   • Impacted cerumen of left ear        Past Medical History:   Diagnosis Date   • A-fib (HCC)    • Atrial flutter (HCC)    • Bradycardia    • Cataract    • Constipation    • Heartburn    • Hyperlipidemia     • Hypothyroid    • Wears glasses        Past Surgical History:   Procedure Laterality Date   • ABLATION OF DYSRHYTHMIC FOCUS     • CARDIAC ELECTROPHYSIOLOGY PROCEDURE N/A 7/15/2020    Procedure: AV node ablation- pt has SJM pacemaker;  Surgeon: Eric Almodovar DO;  Location: [facility] INVASIVE LOC[facility];  Service: Cardiovascular;  Laterality: N/A;   • CARDIOVERSION     • CATARACT EXTRACTION Right    • CATARACT EXTRACTION     • COLONOSCOPY     • INSERT / REPLACE / REMOVE PACEMAKER  08/29/2014    [facility], Location: [facility]   • TUBAL ABDOMINAL LIGATION         Family History   Problem Relation Age of Onset   • Lung cancer Mother    • Heart disease Brother        Social History     Socioeconomic History   • Marital status:    Tobacco Use   • Smoking status: Never   • Smokeless tobacco: Never   Vaping Use   • Vaping Use: Never used   Substance and Sexual Activity   • Alcohol use: No   • Drug use: No   • Sexual activity: Defer       No Known Allergies    Review of Systems   Constitutional: Positive for unexpected weight loss. Negative for activity change, appetite change, diaphoresis, fatigue and unexpected weight gain.   HENT: Positive for congestion and hearing loss.    Eyes: Negative for visual disturbance.   Respiratory: Negative for chest tightness and shortness of breath.    Cardiovascular: Negative for chest pain, palpitations and leg swelling.   Gastrointestinal: Negative for abdominal pain, blood in stool, GERD and indigestion.   Endocrine: Negative for cold intolerance and heat intolerance.   Genitourinary: Negative for dysuria and hematuria.   Musculoskeletal: Negative for arthralgias and myalgias.        Decrease in balance   Skin: Negative for skin lesions.   Neurological: Negative for tremors, seizures, syncope, speech difficulty, weakness, headache, memory problem and confusion.   Hematological: Does not bruise/bleed easily.   Psychiatric/Behavioral: Negative for sleep disturbance and  "depressed mood. The patient is not nervous/anxious.         Vital Signs  Vitals:    03/02/23 0951   BP: 122/64   BP Location: Left arm   Patient Position: Sitting   Cuff Size: Adult   Pulse: 60   Temp: 97.1 °F (36.2 °C)   TempSrc: Temporal   SpO2: 99%   Weight: 58.1 kg (128 lb)   Height: 157.5 cm (62.01\")   PainSc: 0-No pain     Body mass index is 23.41 kg/m².  BMI is within normal parameters. No other follow-up for BMI required.     Advance Care Planning   ACP discussion was held with the patient during this visit. Patient has an advance directive in EMR which is still valid.        Current Outpatient Medications:   •  atorvastatin (LIPITOR) 20 MG tablet, Take 1 tablet by mouth Every Night., Disp: 90 tablet, Rfl: 1  •  BIOTIN PO, Take 5,000 mcg by mouth Daily., Disp: , Rfl:   •  Calcium-Magnesium-Vitamin D (CALCIUM 1200+D3 PO), Take 1 tablet by mouth Daily. 1000 units d3, Disp: , Rfl:   •  docusate sodium (COLACE) 100 MG capsule, Take 1 capsule by mouth Daily As Needed., Disp: , Rfl:   •  Eliquis 5 MG tablet tablet, TAKE ONE TABLET BY MOUTH TWICE A DAY, Disp: 60 tablet, Rfl: 5  •  levothyroxine (SYNTHROID, LEVOTHROID) 88 MCG tablet, Take 1 tablet by mouth Daily., Disp: 30 tablet, Rfl: 5  •  Magnesium 250 MG tablet, Take 2 tablets by mouth Every Other Day., Disp: , Rfl:   •  Multiple Vitamins-Minerals (MULTIVITAMIN ADULT PO), Take 1 tablet by mouth Daily., Disp: , Rfl:     Physical Exam  Vitals reviewed.   Constitutional:       Appearance: Normal appearance. She is well-developed.   HENT:      Head: Normocephalic and atraumatic.      Right Ear: Hearing, tympanic membrane, ear canal and external ear normal.      Left Ear: Hearing, tympanic membrane, ear canal and external ear normal. There is impacted cerumen.      Nose: Nose normal.      Mouth/Throat:      Mouth: Mucous membranes are moist.      Pharynx: Oropharynx is clear. Uvula midline.   Eyes:      General: Lids are normal.      Conjunctiva/sclera: Conjunctivae " normal.      Pupils: Pupils are equal, round, and reactive to light.   Cardiovascular:      Rate and Rhythm: Normal rate and regular rhythm.      Heart sounds: Normal heart sounds.   Pulmonary:      Effort: Pulmonary effort is normal.      Breath sounds: Normal breath sounds.   Abdominal:      General: Bowel sounds are normal.      Palpations: Abdomen is soft.   Musculoskeletal:         General: Normal range of motion.      Cervical back: Full passive range of motion without pain, normal range of motion and neck supple.   Skin:     General: Skin is warm and dry.   Neurological:      Mental Status: She is alert and oriented to person, place, and time.      Deep Tendon Reflexes: Reflexes are normal and symmetric.   Psychiatric:         Speech: Speech normal.         Behavior: Behavior normal.         Thought Content: Thought content normal.         Judgment: Judgment normal.          ACE III MINI            Results Review:    No results found for this or any previous visit (from the past 672 hour(s)).  Ear Cerumen Removal    Date/Time: 3/2/2023 12:26 PM  Performed by: Zamzam Amador PA-C  Authorized by: Zamzam Amador PA-C   Location details: left ear  Patient tolerance: patient tolerated the procedure well with no immediate complications  Comments: Debrox applied.  Cerumen removed with curette  Procedure type: instrumentation, curette   Sedation:  Patient sedated: no            Medication Review: Medications reviewed and noted    Social History     Socioeconomic History   • Marital status:    Tobacco Use   • Smoking status: Never   • Smokeless tobacco: Never   Vaping Use   • Vaping Use: Never used   Substance and Sexual Activity   • Alcohol use: No   • Drug use: No   • Sexual activity: Defer        Assessment/Plan:    Diagnoses and all orders for this visit:    1. Acquired hypothyroidism (Primary)  Overview:  Currently takes levothyroxine 88 mcg daily.      Orders:  -     T3, Free; Future  -      T4, Free; Future  -     TSH; Future    2. Mixed hyperlipidemia  Overview:  Continue atorvastatin 20 mg tablets daily.  Most recent lipid panel obtained on 5/2/2022 shows total cholesterol 161, triglycerides 164, HDL 50, and LDL cholesterol 77.    Orders:  -     Lipid Panel; Future  -     MicroAlbumin, Urine, Random - Urine, Clean Catch; Future    3. Essential hypertension  Overview:  Repeat blood pressure 130/78.  Continue to monitor.  Controlled with diet and exercise.    Orders:  -     CBC & Differential; Future  -     Comprehensive Metabolic Panel; Future    4. Prediabetes  Overview:  Recheck A1c today.    Orders:  -     Hemoglobin A1c; Future    5. Impacted cerumen of left ear  Assessment & Plan:  Debrox applied.  Removed with curette.    Orders:  -     Ear Cerumen Removal    6. Leg cramps  Assessment & Plan:  Check sodium, potassium, magnesium levels today.  Discussed importance of staying hydrated throughout the day.  Discussed importance of regular exercise throughout the day.    Orders:  -     Magnesium; Future    Other orders  -     Cerumen Removal         Plan of care reviewed with patient at the conclusion of today's visit. Education was provided regarding diagnosis, management, and any prescribed or recommended OTC medications.Patient verbalizes understanding of and agreement with management plan.         I spent 32 minutes caring for Carli on this date of service. This time includes time spent by me in the following activities:preparing for the visit, reviewing tests, obtaining and/or reviewing a separately obtained history, performing a medically appropriate examination and/or evaluation , counseling and educating the patient/family/caregiver, ordering medications, tests, or procedures, referring and communicating with other health care professionals  and documenting information in the medical record    Zamzam Amador PA-C      Note: Part of this note may be an electronic  transcription/translation of spoken language to printed text using the Dragon Dictation system.

## 2023-03-02 NOTE — ASSESSMENT & PLAN NOTE
Check sodium, potassium, magnesium levels today.  Discussed importance of staying hydrated throughout the day.  Discussed importance of regular exercise throughout the day.

## 2023-03-02 NOTE — PROGRESS NOTES
Ear Cerumen Removal    Date/Time: 3/2/2023 10:49 AM  Performed by: Zamzam Amador PA-C  Authorized by: Zamzam Amador PA-C     Anesthesia:  Local Anesthetic: none  Ceruminolytics applied: Ceruminolytics applied prior to the procedure.  Location details: left ear  Procedure type: irrigation   Sedation:  Patient sedated: no

## 2023-03-03 LAB — ALBUMIN UR-MCNC: <1.2 MG/DL

## 2023-03-06 ENCOUNTER — OFFICE VISIT (OUTPATIENT)
Dept: CARDIOLOGY | Facility: CLINIC | Age: 88
End: 2023-03-06
Payer: MEDICARE

## 2023-03-06 VITALS
SYSTOLIC BLOOD PRESSURE: 116 MMHG | OXYGEN SATURATION: 99 % | BODY MASS INDEX: 23.74 KG/M2 | HEIGHT: 62 IN | HEART RATE: 81 BPM | WEIGHT: 129 LBS | DIASTOLIC BLOOD PRESSURE: 68 MMHG

## 2023-03-06 DIAGNOSIS — I48.21 PERMANENT ATRIAL FIBRILLATION: ICD-10-CM

## 2023-03-06 DIAGNOSIS — I49.5 SICK SINUS SYNDROME: Primary | ICD-10-CM

## 2023-03-06 DIAGNOSIS — I44.2 COMPLETE HEART BLOCK: ICD-10-CM

## 2023-03-06 DIAGNOSIS — Z79.01 CHRONIC ANTICOAGULATION: ICD-10-CM

## 2023-03-06 DIAGNOSIS — Z95.0 CARDIAC PACEMAKER IN SITU: ICD-10-CM

## 2023-03-06 PROBLEM — R12 HEARTBURN: Status: RESOLVED | Noted: 2022-05-27 | Resolved: 2023-03-06

## 2023-03-06 PROBLEM — R25.2 LEG CRAMPS: Status: RESOLVED | Noted: 2023-03-02 | Resolved: 2023-03-06

## 2023-03-06 PROBLEM — H61.22 IMPACTED CERUMEN OF LEFT EAR: Status: RESOLVED | Noted: 2023-03-02 | Resolved: 2023-03-06

## 2023-03-06 PROBLEM — I10 ESSENTIAL HYPERTENSION: Status: RESOLVED | Noted: 2020-06-15 | Resolved: 2023-03-06

## 2023-03-06 PROCEDURE — 93279 PRGRMG DEV EVAL PM/LDLS PM: CPT | Performed by: PHYSICIAN ASSISTANT

## 2023-03-06 PROCEDURE — 99213 OFFICE O/P EST LOW 20 MIN: CPT | Performed by: PHYSICIAN ASSISTANT

## 2023-03-06 NOTE — PROGRESS NOTES
Encounter Date:03/06/2023      Patient ID: Carli Carver is a 88 y.o. female.    Zamzam Amador PA-C    Chief Complaint: complete heart block , Atrial Fibrillation, and Pacemaker Check      PROBLEM LIST:  Patient Active Problem List    Diagnosis Date Noted   • Permanent atrial fibrillation (HCC) 08/23/2021     Priority: High     Note Last Updated: 3/6/2023     · AV node catheter ablation, 7/15/2020     • Complete heart block (HCC) 08/17/2020     Priority: High   • Sick sinus syndrome (HCC) 06/15/2020     Priority: High     Note Last Updated: 3/6/2023     · [employer] dual-chamber permanent pacemaker, 8/29/2014  · VVIR for permanent A-fib     • Cardiac pacemaker in situ 06/15/2020     Priority: Medium     Note Last Updated: 3/6/2023     · Pacemaker placement since 2014.     • Mixed hyperlipidemia 05/27/2022     Priority: Low   • Chronic anticoagulation 08/23/2021     Priority: Low   • Acquired hypothyroidism 05/27/2022   • Prediabetes 05/27/2022           History of Present Illness  Patient presents today for follow-up with a history of permanent atrial fibrillation on chronic anticoagulation status post AV node ablation and previously placed dual-chamber permanent pacemaker for sick sinus syndrome now in VVIR.  She returns today with no significant complaints.  No dizziness no syncope.  She leads an active lifestyle for her age.  She does not check her blood pressure at home.  She is compliant with her current medical regimen reports no significant adverse side effects.        No Known Allergies    Current Outpatient Medications   Medication Instructions   • atorvastatin (LIPITOR) 20 mg, Oral, Nightly   • BIOTIN PO 5,000 mcg, Oral, Daily   • Calcium-Magnesium-Vitamin D (CALCIUM 1200+D3 PO) 1 tablet, Oral, Daily, 1000 units d3    • docusate sodium (COLACE) 100 mg, Oral, Daily PRN   • Eliquis 5 MG tablet tablet TAKE ONE TABLET BY MOUTH TWICE A DAY   • levothyroxine (SYNTHROID, LEVOTHROID) 88  "mcg, Oral, Daily   • Magnesium 500 mg, Oral, Every Other Day, 1 tablet daily   • Multiple Vitamins-Minerals (MULTIVITAMIN ADULT PO) 1 tablet, Oral, Daily       .    Objective:     /68 (BP Location: Right arm, Patient Position: Sitting)   Pulse 81   Ht 157.5 cm (62\")   Wt 58.5 kg (129 lb)   SpO2 99%   BMI 23.59 kg/m²    Body mass index is 23.59 kg/m².     Vitals reviewed.   Constitutional:       Appearance: Well-developed.   Pulmonary:      Effort: Pulmonary effort is normal. No respiratory distress.      Breath sounds: Normal breath sounds. No wheezing. No rales.      Comments: Bases clear  Chest:      Chest wall: Not tender to palpatation.   Cardiovascular:      Normal rate. Regular rhythm.      Murmurs: There is no murmur.      No gallop. No click. No rub.   Pulses:     Intact distal pulses.   Musculoskeletal: Normal range of motion.       Lab Review:      Results from last 7 days   Lab Units 03/02/23  1052   SODIUM mmol/L 141   POTASSIUM mmol/L 4.2   CHLORIDE mmol/L 104   CO2 mmol/L 26.0   BUN mg/dL 14   CREATININE mg/dL 0.78   GLUCOSE mg/dL 106*   CALCIUM mg/dL 10.2     Results from last 7 days   Lab Units 03/02/23  1052   WBC 10*3/mm3 5.82   HEMOGLOBIN g/dL 15.1   HEMATOCRIT % 43.4   PLATELETS 10*3/mm3 200          TSH    TSH 8/23/22 3/2/23   TSH 0.500 0.425                   Procedures               Assessment:      Diagnosis Plan   1. Sick sinus syndrome (HCC)   device reprogrammed to VVIR status post AV node ablation      2. Complete heart block (HCC)   normal functioning permanent pacemaker VVIR.  Pacemaker dependent      3. Cardiac pacemaker in situ    This patient's Cardiac Implanted Electronic Device was manually interrogated and reprogrammed during the patient encounter today.  Iterative programming changes were manually made to determine the sensing threshold, pacing threshold, lead impedance as well as underlying cardiac rhythm.  These programming changes were not limited to but included " some or all of the following when appropriate: pacing mode, programmed AV delays, blanking periods, and refractory periods.  Data obtained as a result of these manual programing changes informed the patient's CIED permanent programming.  Normal RV lead parameters  2 years battery life remaining      4. Permanent atrial fibrillation (HCC)   status post AV node ablation and chronically anticoagulated      5. Chronic anticoagulation   no bleeding, continue Eliquis at current dose        Plan:     Stable cardiac status.  Continue current medications.   in 6 months, sooner as needed.  Thank you for allowing us to participate in the care of your patient.     Electronically signed by YURI Welch, 03/06/23, 11:53 AM EST.

## 2023-03-16 PROCEDURE — 93294 REM INTERROG EVL PM/LDLS PM: CPT | Performed by: INTERNAL MEDICINE

## 2023-03-16 PROCEDURE — 93296 REM INTERROG EVL PM/IDS: CPT | Performed by: INTERNAL MEDICINE

## 2023-05-23 ENCOUNTER — LAB (OUTPATIENT)
Dept: LAB | Facility: HOSPITAL | Age: 88
End: 2023-05-23
Payer: MEDICARE

## 2023-05-23 ENCOUNTER — HOSPITAL ENCOUNTER (OUTPATIENT)
Dept: GENERAL RADIOLOGY | Facility: HOSPITAL | Age: 88
Discharge: HOME | End: 2023-05-23
Payer: MEDICARE

## 2023-05-23 ENCOUNTER — OFFICE VISIT (OUTPATIENT)
Dept: INTERNAL MEDICINE | Facility: CLINIC | Age: 88
End: 2023-05-23
Payer: MEDICARE

## 2023-05-23 VITALS
DIASTOLIC BLOOD PRESSURE: 68 MMHG | WEIGHT: 128 LBS | HEART RATE: 60 BPM | SYSTOLIC BLOOD PRESSURE: 134 MMHG | BODY MASS INDEX: 23.55 KG/M2 | HEIGHT: 62 IN | TEMPERATURE: 98.2 F

## 2023-05-23 DIAGNOSIS — M79.671 RIGHT FOOT PAIN: Primary | ICD-10-CM

## 2023-05-23 DIAGNOSIS — R60.0 EDEMA OF RIGHT FOOT: ICD-10-CM

## 2023-05-23 DIAGNOSIS — M79.671 RIGHT FOOT PAIN: ICD-10-CM

## 2023-05-23 LAB — URATE SERPL-MCNC: 3.7 MG/DL (ref 2.4–5.7)

## 2023-05-23 PROCEDURE — 84550 ASSAY OF BLOOD/URIC ACID: CPT

## 2023-05-23 PROCEDURE — 73630 X-RAY EXAM OF FOOT: CPT

## 2023-05-23 NOTE — PROGRESS NOTES
Acute Office Visit      Name: Carli Carver    : 1935     MRN: [id number]   Care Team: Patient Care Team:  Zamzam Amador PA-C as PCP - General (Physician Assistant)  Eric Almodovar DO as Consulting Physician (Cardiology)  SALBADOR Nicole Jr., MD as Consulting Physician (Ophthalmology)  Lida Lin APRN as Nurse Practitioner (Family Medicine)    Chief Complaint  Foot Pain (Right foot pain/)    Subjective     History of Present Illness:  Carli Carver is a 88 y.o. female who presents today for pain to the right foot\ankle.    Ms. Carver reports pain to the outer area of her right foot that wraps around her right ankle.  She reports feeling this pain for about a month.  She has also been experiencing swelling and decreased range of motion to area.  She states that the area is always sore but she will experience sharp intense pain when standing or attempting to ambulate.  Also, when pain originally occurred, the area was warm to touch.  Denies any acute trauma or falls.    Review of systems was completed, and pertinent findings are noted in the HPI.  Review of Systems   Musculoskeletal: Positive for gait problem.        Complaints of painful, red, swollen right ankle   All other systems reviewed and are negative.      Past Medical History:   Diagnosis Date   • A-fib    • Atrial flutter    • Bradycardia    • Cataract    • Constipation    • Heartburn    • Hyperlipidemia    • Hypothyroid    • Wears glasses        Past Surgical History:   Procedure Laterality Date   • ABLATION OF DYSRHYTHMIC FOCUS     • CARDIAC ELECTROPHYSIOLOGY PROCEDURE N/A 7/15/2020    Procedure: AV node ablation- pt has SJM pacemaker;  Surgeon: Eric Almodovar DO;  Location: [facility] INVASIVE LOCATION;  Service: Cardiovascular;  Laterality: N/A;   • CARDIOVERSION     • CATARACT EXTRACTION Right    • CATARACT EXTRACTION     • COLONOSCOPY     • INSERT / REPLACE / REMOVE PACEMAKER  2014    [employer], Location: [facility]   • TUBAL ABDOMINAL LIGATION         Social History     Socioeconomic History   • Marital status:    Tobacco Use   • Smoking status: Never   • Smokeless tobacco: Never   Vaping Use   • Vaping Use: Never used   Substance and Sexual Activity   • Alcohol use: No   • Drug use: No   • Sexual activity: Defer         Current Outpatient Medications:   •  atorvastatin (LIPITOR) 20 MG tablet, Take 1 tablet by mouth Every Night., Disp: 90 tablet, Rfl: 1  •  BIOTIN PO, Take 5,000 mcg by mouth Daily., Disp: , Rfl:   •  Calcium-Magnesium-Vitamin D (CALCIUM 1200+D3 PO), Take 1 tablet by mouth Daily. 1000 units d3, Disp: , Rfl:   •  docusate sodium (COLACE) 100 MG capsule, Take 1 capsule by mouth Daily As Needed., Disp: , Rfl:   •  Eliquis 5 MG tablet tablet, TAKE ONE TABLET BY MOUTH TWICE A DAY, Disp: 60 tablet, Rfl: 5  •  levothyroxine (SYNTHROID, LEVOTHROID) 88 MCG tablet, Take 1 tablet by mouth Daily., Disp: 30 tablet, Rfl: 5  •  Magnesium 250 MG tablet, Take 1 tablet by mouth Daily., Disp: , Rfl:   •  Multiple Vitamins-Minerals (MULTIVITAMIN ADULT PO), Take 1 tablet by mouth Daily., Disp: , Rfl:     Procedures    PHQ-9 Total Score:      Objective     Vital Signs  /68 (BP Location: Left arm, Patient Position: Sitting, Cuff Size: Adult)   Pulse 60   Temp 98.2 °F (36.8 °C) (Temporal)   Ht 157.5 cm (62.01\")   Wt 58.1 kg (128 lb)   BMI 23.41 kg/m²   Estimated body mass index is 23.41 kg/m² as calculated from the following:    Height as of this encounter: 157.5 cm (62.01\").    Weight as of this encounter: 58.1 kg (128 lb).    BMI is within normal parameters. No other follow-up for BMI required.      Physical Exam  Vitals and nursing note reviewed.   HENT:      Head: Normocephalic.   Musculoskeletal:      Right ankle: Swelling and ecchymosis present. Tenderness present. Decreased range of motion.      Left ankle: Normal.      Right foot: Decreased range of motion. Swelling and " tenderness present.      Left foot: Normal.   Skin:     General: Skin is warm and dry.   Neurological:      General: No focal deficit present.      Mental Status: She is alert and oriented to person, place, and time.   Psychiatric:         Mood and Affect: Mood normal.         Behavior: Behavior normal.         Thought Content: Thought content normal.         Judgment: Judgment normal.          [unfilled]    Assessment and Plan     Assessment/Plan:  Diagnoses and all orders for this visit:    1. Right foot pain (Primary)  -     XR Foot 3+ View Right; Future  -     Uric Acid; Future  -Discussed and encouraged patient to take OTC pain medications to include ibuprofen and Tylenol.  Further prescriptions pending results of imaging and labs.    2. Edema of right foot  -     XR Foot 3+ View Right; Future  -     Uric Acid; Future         There are no Patient Instructions on file for this visit.  Plan of care reviewed with patient at the conclusion of today's visit. Education was provided regarding diagnosis, management and any prescribed or recommended OTC medications.  Patient verbalizes understanding of and agreement with management plan.    Follow Up  Return for Next Scheduled Follow up.    LYUDMILA Lowe

## 2023-05-24 ENCOUNTER — TELEPHONE (OUTPATIENT)
Dept: INTERNAL MEDICINE | Facility: CLINIC | Age: 88
End: 2023-05-24
Payer: MEDICARE

## 2023-05-24 NOTE — TELEPHONE ENCOUNTER
Spoke with patient regarding right ankle and foot pain and swelling.  Offered MRI and referral to Ortho after uric acid and x-ray negative.  Ms. Carver declined at this time.  Stated she would rest and see if symptoms resolve.  Also, encouraged Ms. Carver to avoid NSAIDs and use Tylenol for pain instead due to use of Eliquis.  Patient verbalized understanding.

## 2023-05-24 NOTE — TELEPHONE ENCOUNTER
Spoke to MsHina Mehul this morning and notified her that her x-ray was negative for acute fracture and soft tissue swelling.  Also notified her that her uric acid level was normal.  We will give her a call back later this afternoon.

## 2023-05-26 RX ORDER — LEVOTHYROXINE SODIUM 88 UG/1
TABLET ORAL
Qty: 30 TABLET | Refills: 5 | Status: SHIPPED | OUTPATIENT
Start: 2023-05-26

## 2023-08-29 ENCOUNTER — TELEPHONE (OUTPATIENT)
Dept: INTERNAL MEDICINE | Facility: CLINIC | Age: 88
End: 2023-08-29
Payer: MEDICARE

## 2023-08-29 DIAGNOSIS — I10 ESSENTIAL HYPERTENSION: ICD-10-CM

## 2023-08-29 DIAGNOSIS — E03.9 ACQUIRED HYPOTHYROIDISM: Primary | ICD-10-CM

## 2023-08-29 DIAGNOSIS — R73.03 PREDIABETES: ICD-10-CM

## 2023-08-29 DIAGNOSIS — E78.2 MIXED HYPERLIPIDEMIA: ICD-10-CM

## 2023-08-29 NOTE — TELEPHONE ENCOUNTER
THE PATIENT CALLED AND WOULD LIKE FOR HER LABS TO BE PUT IN THE SYSTEM SO THAT SHE CAN HAVE THEM DONE PRIOR TO HER APPT ON  09/07/23.  PLEASE CALL THE PATIENT WHEN THEY ARE IN THE SYSTEM SO THAT SHE CAN HAVE THEM DONE.  [phone]

## 2023-08-31 ENCOUNTER — LAB (OUTPATIENT)
Dept: LAB | Facility: HOSPITAL | Age: 88
End: 2023-08-31
Payer: MEDICARE

## 2023-08-31 DIAGNOSIS — R73.03 PREDIABETES: ICD-10-CM

## 2023-08-31 DIAGNOSIS — E03.9 ACQUIRED HYPOTHYROIDISM: ICD-10-CM

## 2023-08-31 DIAGNOSIS — E78.2 MIXED HYPERLIPIDEMIA: ICD-10-CM

## 2023-08-31 DIAGNOSIS — I10 ESSENTIAL HYPERTENSION: ICD-10-CM

## 2023-08-31 LAB
ALBUMIN SERPL-MCNC: 4.5 G/DL (ref 3.5–5.2)
ALBUMIN UR-MCNC: <1.2 MG/DL
ALBUMIN/GLOB SERPL: 1.7 G/DL
ALP SERPL-CCNC: 104 U/L (ref 39–117)
ALT SERPL W P-5'-P-CCNC: 23 U/L (ref 1–33)
ANION GAP SERPL CALCULATED.3IONS-SCNC: 11 MMOL/L (ref 5–15)
AST SERPL-CCNC: 26 U/L (ref 1–32)
BASOPHILS # BLD AUTO: 0.02 10*3/MM3 (ref 0–0.2)
BASOPHILS NFR BLD AUTO: 0.4 % (ref 0–1.5)
BILIRUB SERPL-MCNC: 1.7 MG/DL (ref 0–1.2)
BUN SERPL-MCNC: 19 MG/DL (ref 8–23)
BUN/CREAT SERPL: 20.7 (ref 7–25)
CALCIUM SPEC-SCNC: 10.5 MG/DL (ref 8.6–10.5)
CHLORIDE SERPL-SCNC: 106 MMOL/L (ref 98–107)
CHOLEST SERPL-MCNC: 142 MG/DL (ref 0–200)
CO2 SERPL-SCNC: 25 MMOL/L (ref 22–29)
CREAT SERPL-MCNC: 0.92 MG/DL (ref 0.57–1)
DEPRECATED RDW RBC AUTO: 42.3 FL (ref 37–54)
EGFRCR SERPLBLD CKD-EPI 2021: 60.8 ML/MIN/1.73
EOSINOPHIL # BLD AUTO: 0.11 10*3/MM3 (ref 0–0.4)
EOSINOPHIL NFR BLD AUTO: 2.2 % (ref 0.3–6.2)
ERYTHROCYTE [DISTWIDTH] IN BLOOD BY AUTOMATED COUNT: 12.3 % (ref 12.3–15.4)
GLOBULIN UR ELPH-MCNC: 2.6 GM/DL
GLUCOSE SERPL-MCNC: 113 MG/DL (ref 65–99)
HBA1C MFR BLD: 6.8 % (ref 4.8–5.6)
HCT VFR BLD AUTO: 42.6 % (ref 34–46.6)
HDLC SERPL-MCNC: 60 MG/DL (ref 40–60)
HGB BLD-MCNC: 15.1 G/DL (ref 12–15.9)
IMM GRANULOCYTES # BLD AUTO: 0.01 10*3/MM3 (ref 0–0.05)
IMM GRANULOCYTES NFR BLD AUTO: 0.2 % (ref 0–0.5)
LDLC SERPL CALC-MCNC: 65 MG/DL (ref 0–100)
LDLC/HDLC SERPL: 1.07 {RATIO}
LYMPHOCYTES # BLD AUTO: 1.95 10*3/MM3 (ref 0.7–3.1)
LYMPHOCYTES NFR BLD AUTO: 38.9 % (ref 19.6–45.3)
MCH RBC QN AUTO: 33 PG (ref 26.6–33)
MCHC RBC AUTO-ENTMCNC: 35.4 G/DL (ref 31.5–35.7)
MCV RBC AUTO: 93 FL (ref 79–97)
MONOCYTES # BLD AUTO: 0.54 10*3/MM3 (ref 0.1–0.9)
MONOCYTES NFR BLD AUTO: 10.8 % (ref 5–12)
NEUTROPHILS NFR BLD AUTO: 2.38 10*3/MM3 (ref 1.7–7)
NEUTROPHILS NFR BLD AUTO: 47.5 % (ref 42.7–76)
NRBC BLD AUTO-RTO: 0 /100 WBC (ref 0–0.2)
PLATELET # BLD AUTO: 204 10*3/MM3 (ref 140–450)
PMV BLD AUTO: 10.2 FL (ref 6–12)
POTASSIUM SERPL-SCNC: 4.4 MMOL/L (ref 3.5–5.2)
PROT SERPL-MCNC: 7.1 G/DL (ref 6–8.5)
RBC # BLD AUTO: 4.58 10*6/MM3 (ref 3.77–5.28)
SODIUM SERPL-SCNC: 142 MMOL/L (ref 136–145)
T3FREE SERPL-MCNC: 3.05 PG/ML (ref 2–4.4)
T4 FREE SERPL-MCNC: 1.69 NG/DL (ref 0.93–1.7)
TRIGL SERPL-MCNC: 90 MG/DL (ref 0–150)
TSH SERPL DL<=0.05 MIU/L-ACNC: 0.51 UIU/ML (ref 0.27–4.2)
VLDLC SERPL-MCNC: 17 MG/DL (ref 5–40)
WBC NRBC COR # BLD: 5.01 10*3/MM3 (ref 3.4–10.8)

## 2023-08-31 PROCEDURE — 83036 HEMOGLOBIN GLYCOSYLATED A1C: CPT

## 2023-08-31 PROCEDURE — 84439 ASSAY OF FREE THYROXINE: CPT

## 2023-08-31 PROCEDURE — 80053 COMPREHEN METABOLIC PANEL: CPT

## 2023-08-31 PROCEDURE — 80061 LIPID PANEL: CPT

## 2023-08-31 PROCEDURE — 82043 UR ALBUMIN QUANTITATIVE: CPT

## 2023-08-31 PROCEDURE — 84481 FREE ASSAY (FT-3): CPT

## 2023-08-31 PROCEDURE — 85025 COMPLETE CBC W/AUTO DIFF WBC: CPT

## 2023-08-31 PROCEDURE — 84443 ASSAY THYROID STIM HORMONE: CPT

## 2023-09-07 ENCOUNTER — OFFICE VISIT (OUTPATIENT)
Dept: INTERNAL MEDICINE | Facility: CLINIC | Age: 88
End: 2023-09-07
Payer: MEDICARE

## 2023-09-07 VITALS
HEART RATE: 52 BPM | HEIGHT: 62 IN | SYSTOLIC BLOOD PRESSURE: 122 MMHG | WEIGHT: 128.6 LBS | TEMPERATURE: 98.1 F | BODY MASS INDEX: 23.66 KG/M2 | DIASTOLIC BLOOD PRESSURE: 62 MMHG

## 2023-09-07 DIAGNOSIS — I49.5 SICK SINUS SYNDROME: ICD-10-CM

## 2023-09-07 DIAGNOSIS — R53.83 FATIGUE, UNSPECIFIED TYPE: ICD-10-CM

## 2023-09-07 DIAGNOSIS — R73.03 PREDIABETES: ICD-10-CM

## 2023-09-07 DIAGNOSIS — Z00.00 MEDICARE ANNUAL WELLNESS VISIT, SUBSEQUENT: Primary | ICD-10-CM

## 2023-09-07 DIAGNOSIS — E78.2 MIXED HYPERLIPIDEMIA: ICD-10-CM

## 2023-09-07 DIAGNOSIS — I48.21 PERMANENT ATRIAL FIBRILLATION: ICD-10-CM

## 2023-09-07 DIAGNOSIS — E03.9 ACQUIRED HYPOTHYROIDISM: ICD-10-CM

## 2023-09-07 DIAGNOSIS — R42 DIZZINESS: ICD-10-CM

## 2023-09-07 NOTE — PROGRESS NOTES
QUICK REFERENCE INFORMATION:  The ABCs of the Annual Wellness Visit    Annual Wellness visit    HEALTH RISK ASSESSMENT    1935    Recent History  No hospitalization(s) within the last year..        Current Medical Providers:  Patient Care Team:  Zamzam Amador PA-C as PCP - General (Physician Assistant)  Eric Almodovar DO as Consulting Physician (Cardiology)  SALBADOR Nicole Jr., MD as Consulting Physician (Ophthalmology)  Lida Lin APRN as Nurse Practitioner (Family Medicine)  Michael Jamison MD (Optometry)        Smoking Status:  Social History     Tobacco Use   Smoking Status Never   Smokeless Tobacco Never       Alcohol Consumption:  Social History     Substance and Sexual Activity   Alcohol Use No       Depression Screen:       9/7/2023    10:21 AM   PHQ-2/PHQ-9 Depression Screening   Little Interest or Pleasure in Doing Things 0-->not at all   Feeling Down, Depressed or Hopeless 0-->not at all   PHQ-9: Brief Depression Severity Measure Score 0       Health Habits:              Recent Lab Results:  CMP:  Lab Results   Component Value Date    BUN 19 08/31/2023    CREATININE 0.92 08/31/2023    EGFRIFNONA 63 07/12/2020    BCR 20.7 08/31/2023     08/31/2023    K 4.4 08/31/2023    CO2 25.0 08/31/2023    CALCIUM 10.5 08/31/2023    ALBUMIN 4.5 08/31/2023    BILITOT 1.7 (H) 08/31/2023    ALKPHOS 104 08/31/2023    AST 26 08/31/2023    ALT 23 08/31/2023     Lipid Panel:  Lab Results   Component Value Date    CHOL 142 08/31/2023    TRIG 90 08/31/2023    HDL 60 08/31/2023    VLDL 17 08/31/2023    LDLHDL 1.07 08/31/2023     HbA1c:  Lab Results   Component Value Date    HGBA1C 6.80 (H) 08/31/2023           Age-appropriate Screening Schedule:  Refer to the list below for future screening recommendations based on patient's age, sex and/or medical conditions. Orders for these recommended tests are listed in the plan section. The patient has been provided with a written plan.    Health  "Maintenance   Topic Date Due    COVID-19 Vaccine (1) Never done    Pneumococcal Vaccine 65+ (1 - PCV) 01/12/1941    TDAP/TD VACCINES (1 - Tdap) Never done    ZOSTER VACCINE (1 of 2) Never done    DIABETIC EYE EXAM  Never done    DXA SCAN  03/30/2020    INFLUENZA VACCINE  10/01/2023    HEMOGLOBIN A1C  02/29/2024    LIPID PANEL  08/31/2024    URINE MICROALBUMIN  08/31/2024    ANNUAL WELLNESS VISIT  09/07/2024        Subjective   History of Present Illness    Carli Carver is a 88 y.o. female who presents for an Annual Wellness Visit.  Past medical history significant for hypertension, cardiac pacemaker, hyperlipidemia, diabetes,  hypothyroidism.  She had COVID over January 2023.   also had COVID.  She is primary caregiver for .    She is tearful today.  Reports increasing fatigue, feels \"something is off.\"  She tries to get outside several days a week, goes shopping and runs errands with daughters.     Recent labs show elevated A1C. She has more dizzy episodes.Reports these episodes usually occur around 3-4pm. Reports eating very light lunch and dinner.      She denies shortness of breath.  Has a new sharp pain radiating across upper stomach.  Not related to meals. Has pacemaker, follows with Dr. Almodovar.     The following portions of the patient's history were reviewed and updated as appropriate: allergies, current medications, past family history, past medical history, past social history, past surgical history, and problem list.    Outpatient Medications Prior to Visit   Medication Sig Dispense Refill    apixaban (Eliquis) 5 MG tablet tablet TAKE ONE TABLET BY MOUTH TWICE A DAY 60 tablet 6    atorvastatin (LIPITOR) 20 MG tablet TAKE ONE TABLET BY MOUTH ONCE NIGHTLY 90 tablet 1    BIOTIN PO Take 5,000 mcg by mouth Daily.      Calcium-Magnesium-Vitamin D (CALCIUM 1200+D3 PO) Take 1 tablet by mouth Daily. 1000 units d3      docusate sodium (COLACE) 100 MG capsule Take 1 capsule by mouth As Needed.   "    levothyroxine (SYNTHROID, LEVOTHROID) 88 MCG tablet TAKE ONE TABLET BY MOUTH DAILY 30 tablet 5    Magnesium 250 MG tablet Take 1 tablet by mouth Daily.      Multiple Vitamins-Minerals (MULTIVITAMIN ADULT PO) Take 1 tablet by mouth Daily.       No facility-administered medications prior to visit.       Patient Active Problem List   Diagnosis    Sick sinus syndrome (HCC)    Cardiac pacemaker in situ    Complete heart block (HCC)    Permanent atrial fibrillation (HCC)    Chronic anticoagulation    Mixed hyperlipidemia    Acquired hypothyroidism    Type II diabetes mellitus    Medicare annual wellness visit, subsequent    Fatigue    Dizziness       Advance Care Planning:  ACP discussion was held with the patient during this visit. Patient has an advance directive in EMR which is still valid.     Identification of Risk Factors:  Risk factors include: Advance Directive Discussion.    Review of Systems   Constitutional:  Positive for fatigue. Negative for chills and fever.   HENT:  Positive for dental problem. Negative for congestion, ear pain and sinus pressure.    Eyes:  Negative for discharge and itching.   Respiratory:  Negative for cough, chest tightness, shortness of breath and wheezing.    Cardiovascular:  Positive for chest pain. Negative for palpitations.   Gastrointestinal:  Positive for abdominal pain. Negative for blood in stool and constipation.   Endocrine: Negative for cold intolerance and heat intolerance.   Skin:  Negative for color change.   Allergic/Immunologic: Negative for environmental allergies.   Neurological:  Positive for dizziness. Negative for speech difficulty and headaches.   Psychiatric/Behavioral:  Negative for confusion. The patient is not nervous/anxious.      Compared to one year ago, the patient feels her physical health is worse.  Compared to one year ago, the patient feels her mental health is the same.    Objective     Physical Exam  Vitals and nursing note reviewed.  "  Constitutional:       Appearance: Normal appearance.   HENT:      Head: Normocephalic and atraumatic.      Right Ear: Tympanic membrane, ear canal and external ear normal.      Left Ear: Tympanic membrane, ear canal and external ear normal.      Nose: Nose normal.      Mouth/Throat:      Mouth: Mucous membranes are moist. Mucous membranes are dry.   Eyes:      Conjunctiva/sclera: Conjunctivae normal.      Pupils: Pupils are equal, round, and reactive to light.   Cardiovascular:      Rate and Rhythm: Normal rate and regular rhythm.      Pulses: Normal pulses.      Heart sounds: Normal heart sounds.   Pulmonary:      Effort: Pulmonary effort is normal.      Breath sounds: Normal breath sounds.   Abdominal:      General: Abdomen is flat. Bowel sounds are normal.   Musculoskeletal:         General: Normal range of motion.      Cervical back: Normal range of motion and neck supple.      Right lower leg: No edema.      Left lower leg: No edema.   Skin:     General: Skin is warm and dry.   Neurological:      General: No focal deficit present.      Mental Status: She is alert and oriented to person, place, and time.      Deep Tendon Reflexes: Reflexes normal.   Psychiatric:         Mood and Affect: Mood normal. Affect is tearful.         Behavior: Behavior normal.         ECG 12 Lead    Date/Time: 9/10/2023 9:13 AM  Performed by: Zamzam Amador PA-C  Authorized by: Zamzam Amador PA-C   Rhythm: paced  BPM: 60  Pacing: ventricular paced rhythmComments: Electronic ventricular pacemaker with 60bpm      Vitals:    09/07/23 1010   BP: 122/62   BP Location: Left arm   Patient Position: Sitting   Cuff Size: Adult   Pulse: 52   Temp: 98.1 °F (36.7 °C)   TempSrc: Temporal   Weight: 58.3 kg (128 lb 9.6 oz)   Height: 157.5 cm (62.01\")   PainSc: 0-No pain       BMI is within normal parameters. No other follow-up for BMI required.      CMP:  Lab Results   Component Value Date    BUN 19 08/31/2023    CREATININE 0.92 " 08/31/2023    EGFRIFNONA 63 07/12/2020    BCR 20.7 08/31/2023     08/31/2023    K 4.4 08/31/2023    CO2 25.0 08/31/2023    CALCIUM 10.5 08/31/2023    ALBUMIN 4.5 08/31/2023    BILITOT 1.7 (H) 08/31/2023    ALKPHOS 104 08/31/2023    AST 26 08/31/2023    ALT 23 08/31/2023     HbA1c:  Lab Results   Component Value Date    HGBA1C 6.80 (H) 08/31/2023    HGBA1C 6.60 (H) 03/02/2023     Microalbumin:  Lab Results   Component Value Date    MICROALBUR <1.2 08/31/2023     Lipid Panel  Lab Results   Component Value Date    CHOL 142 08/31/2023    TRIG 90 08/31/2023    HDL 60 08/31/2023    LDL 65 08/31/2023    AST 26 08/31/2023    ALT 23 08/31/2023       Assessment & Plan   Patient Self-Management and Personalized Health Advice  The patient has been provided with information about: prevention of cardiac or vascular disease and preventive services including:   Annual Wellness Visit (AWV).    Diagnoses and all orders for this visit:    1. Medicare annual wellness visit, subsequent (Primary)  Overview:  No falls in the past 6 months.  She declines all immunizations.  Current on age recommended screenings.        2. Permanent atrial fibrillation (HCC)  Overview:  AV node catheter ablation, 7/15/2020    Orders:  -     Ambulatory Referral to [facility] [place] [facility] [place]  -     ECG 12 Lead    3. Prediabetes  Overview:  A1C 6.8%. Cut back on processed foods and sweets.  She declines medication for now.  Will try eating more complex carbs and proteins.   Continue walking three times a week.        4. Mixed hyperlipidemia    5. Fatigue, unspecified type  Overview:  Worsening. Discussed openly with patient and her daughter.  Labs did show worsening blood sugars, will make dietary changes first.  Her Thyroid in range.  Consider cardiac workup. Dr. Almodovar has reassured pacemaker ok    Orders:  -     Ambulatory Referral to [facility] and [facility] [place] [facility] [place]    6. Dizziness  -     Ambulatory Referral to [facility] and  [facility] [place] [facility] [place]    7. Acquired hypothyroidism  Overview:  Levothyroxine 88mcg daily.      8. Sick sinus syndrome (HCC)  Overview:  [employer] dual-chamber permanent pacemaker, 2014  VVIR for permanent A-fib      Addendum:  Patient was called back to the office to repeat EKG, after ST elevation noted on strip. This was not apparent on second EKG.  [place] that there was error on first EKG due to improper placing and user error.      Patient Instructions     Medicare Wellness  Personal Prevention Plan of Service     Date of Office Visit:    Encounter Provider:  Zamzam Amador PA-C  Place of Service:  [facility]  Patient Name: Carli Carver  :  1935    As part of the Medicare Wellness portion of your visit today, we are providing you with this personalized preventive plan of services (PPPS). This plan is based upon recommendations of the [place] Preventive Services Task Force (USPSTF) and the Advisory Committee on Immunization Practices (ACIP).    This lists the preventive care services that should be considered, and provides dates of when you are due. Items listed as completed are up-to-date and do not require any further intervention.    Health Maintenance   Topic Date Due    COVID-19 Vaccine (1) Never done    Pneumococcal Vaccine 65+ (1 - PCV) 1941    TDAP/TD VACCINES (1 - Tdap) Never done    ZOSTER VACCINE (1 of 2) Never done    DIABETIC EYE EXAM  Never done    DXA SCAN  2020    ANNUAL WELLNESS VISIT  2023    INFLUENZA VACCINE  10/01/2023    HEMOGLOBIN A1C  2024    LIPID PANEL  2024    URINE MICROALBUMIN  2024       Orders Placed This Encounter   Procedures    Ambulatory Referral to [facility] [place] [facility] [place]     Referral Priority:   Urgent     Referral Type:   Consultation     Referral Reason:   Specialty Services Required     Referred to Provider:   Arcelia Mcclain, APRN     Number of Visits  "Requested:   1    ECG 12 Lead     This order was created via procedure documentation     Order Specific Question:   Release to patient     Answer:   Routine Release [[id number]]       No follow-ups on file.        BMI for Adults  What is BMI?  Body mass index (BMI) is a number that is calculated from a person's weight and height. BMI can help estimate how much of a person's weight is composed of fat. BMI does not measure body fat directly. Rather, it is an alternative to procedures that directly measure body fat, which can be difficult and expensive.  BMI can help identify people who may be at higher risk for certain medical problems.  What are BMI measurements used for?  BMI is used as a screening tool to identify possible weight problems. It helps determine whether a person is obese, overweight, a healthy weight, or underweight.  BMI is useful for:  Identifying a weight problem that may be related to a medical condition or may increase the risk for medical problems.  Promoting changes, such as changes in diet and exercise, to help reach a healthy weight. BMI screening can be repeated to see if these changes are working.  How is BMI calculated?  BMI involves measuring your weight in relation to your height. Both height and weight are measured, and the BMI is calculated from those numbers. This can be done either in English (U[place] or metric measurements. Note that charts and online BMI calculators are available to help you find your BMI quickly and easily without having to do these calculations yourself.  To calculate your BMI in English ([place].S[place] measurements:    Measure your weight in pounds (lb).  Multiply the number of pounds by 703.  For example, for a person who weighs 180 lb, multiply that number by 703, which equals 126,540.  Measure your height in inches. Then multiply that number by itself to get a measurement called \"inches squared.\"  For example, for a person who is 70 inches tall, the \"inches squared\" " "measurement is 70 inches x 70 inches, which equals 4,900 inches squared.  Divide the total from step 2 (number of lb x 703) by the total from step 3 (inches squared): 126,540 ÷ 4,900 = 25.8. This is your BMI.  To calculate your BMI in metric measurements:  Measure your weight in kilograms (kg).  Measure your height in meters (m). Then multiply that number by itself to get a measurement called \"meters squared.\"  For example, for a person who is 1.75 m tall, the \"meters squared\" measurement is 1.75 m x 1.75 m, which is equal to 3.1 meters squared.  Divide the number of kilograms (your weight) by the meters squared number. In this example: 70 ÷ 3.1 = 22.6. This is your BMI.  What do the results mean?  BMI charts are used to identify whether you are underweight, normal weight, overweight, or obese. The following guidelines will be used:  Underweight: BMI less than 18.5.  Normal weight: BMI between 18.5 and 24.9.  Overweight: BMI between 25 and 29.9.  Obese: BMI of 30 or above.  Keep these notes in mind:  Weight includes both fat and muscle, so someone with a muscular build, such as an athlete, may have a BMI that is higher than 24.9. In cases like these, BMI is not an accurate measure of body fat.  To determine if excess body fat is the cause of a BMI of 25 or higher, further assessments may need to be done by a health care provider.  BMI is usually interpreted in the same way for men and women.  Where to find more information  For more information about BMI, including tools to quickly calculate your BMI, go to these websites:  Centers for Disease Control and Prevention: www.cdc.gov  American Heart Association: www.heart.org  National Heart, Lung, and Blood [place]: www.nhlbi.nih.gov  Summary  Body mass index (BMI) is a number that is calculated from a person's weight and height.  BMI may help estimate how much of a person's weight is composed of fat. BMI can help identify those who may be at higher risk for certain " medical problems.  BMI can be measured using English measurements or metric measurements.  BMI charts are used to identify whether you are underweight, normal weight, overweight, or obese.  This information is not intended to replace advice given to you by your health care provider. Make sure you discuss any questions you have with your health care provider.  Document Revised: 09/09/2020 Document Reviewed: 07/17/2020  [employer] Patient Education © 2023 [employer]      Outpatient Encounter Medications as of 9/7/2023   Medication Sig Dispense Refill    apixaban (Eliquis) 5 MG tablet tablet TAKE ONE TABLET BY MOUTH TWICE A DAY 60 tablet 6    atorvastatin (LIPITOR) 20 MG tablet TAKE ONE TABLET BY MOUTH ONCE NIGHTLY 90 tablet 1    BIOTIN PO Take 5,000 mcg by mouth Daily.      Calcium-Magnesium-Vitamin D (CALCIUM 1200+D3 PO) Take 1 tablet by mouth Daily. 1000 units d3      docusate sodium (COLACE) 100 MG capsule Take 1 capsule by mouth As Needed.      levothyroxine (SYNTHROID, LEVOTHROID) 88 MCG tablet TAKE ONE TABLET BY MOUTH DAILY 30 tablet 5    Magnesium 250 MG tablet Take 1 tablet by mouth Daily.      Multiple Vitamins-Minerals (MULTIVITAMIN ADULT PO) Take 1 tablet by mouth Daily.       No facility-administered encounter medications on file as of 9/7/2023.       Reviewed use of high risk medication in the elderly: yes  Reviewed for potential of harmful drug interactions in the elderly: yes    Follow Up:  No follow-ups on file.     An After Visit Summary and PPPS with all of these plans were given to the patient.           I spent 60 minutes caring for Carli on this date of service. This time includes time spent by me in the following activities:preparing for the visit, reviewing tests, obtaining and/or reviewing a separately obtained history, performing a medically appropriate examination and/or evaluation , counseling and educating the patient/family/caregiver, ordering medications, tests, or procedures,  referring and communicating with other health care professionals , and documenting information in the medical record    Zamzam Amador PA-C    Note: Part of this note may be an electronic transcription/translation of spoken language to printed text using the Dragon Dictation System.

## 2023-09-08 ENCOUNTER — TELEPHONE (OUTPATIENT)
Dept: CARDIOLOGY | Facility: CLINIC | Age: 88
End: 2023-09-08
Payer: MEDICARE

## 2023-09-08 NOTE — TELEPHONE ENCOUNTER
Remote monitoring reading is normal with no events.  Spoke with her and encouraged her to keep her f/u appt with Dr Almodovar. She was satisfied with information and will keep appt.

## 2023-09-08 NOTE — TELEPHONE ENCOUNTER
Caller: Carli Carvre    Relationship: Self    Best call back number: [phone]    What is the best time to reach you: ANY    Who are you requesting to speak with (clinical staff, provider,  specific staff member): ANY      What was the call regarding: PT WANTS TO TALK TO SOMEONE ABOUT HER PACEMAKER, SHE SAID SHE THINKS SOMEONE NEEDS TO RUN A SCAN ON IT. SHE HAS BEEN WANTING TO SLEEP A LOT, SHE CRIES A LOT FOR NO REASON. WOULD LIKE SOMEONE TO CALL HER BACK.    Is it okay if the provider responds through [facility]: NO

## 2023-09-10 PROBLEM — R42 DIZZINESS: Status: ACTIVE | Noted: 2023-09-10

## 2023-09-10 PROBLEM — R53.83 FATIGUE: Status: ACTIVE | Noted: 2023-09-10

## 2023-09-10 PROBLEM — E11.9 TYPE II DIABETES MELLITUS: Status: ACTIVE | Noted: 2022-05-27

## 2023-09-10 NOTE — PATIENT INSTRUCTIONS
Medicare Wellness  Personal Prevention Plan of Service     Date of Office Visit:    Encounter Provider:  Zamzam Amador PA-C  Place of Service:  [facility] INTERNAL MEDICINE  Patient Name: Carli Carver  :  1935    As part of the Medicare Wellness portion of your visit today, we are providing you with this personalized preventive plan of services (PPPS). This plan is based upon recommendations of the [place] Preventive Services Task Force (USPSTF) and the Advisory Committee on Immunization Practices (ACIP).    This lists the preventive care services that should be considered, and provides dates of when you are due. Items listed as completed are up-to-date and do not require any further intervention.    Health Maintenance   Topic Date Due    COVID-19 Vaccine (1) Never done    Pneumococcal Vaccine 65+ (1 - PCV) 1941    TDAP/TD VACCINES (1 - Tdap) Never done    ZOSTER VACCINE (1 of 2) Never done    DIABETIC EYE EXAM  Never done    DXA SCAN  2020    ANNUAL WELLNESS VISIT  2023    INFLUENZA VACCINE  10/01/2023    HEMOGLOBIN A1C  2024    LIPID PANEL  2024    URINE MICROALBUMIN  2024       Orders Placed This Encounter   Procedures    Ambulatory Referral to [facility] [place] [facility] [place]     Referral Priority:   Urgent     Referral Type:   Consultation     Referral Reason:   Specialty Services Required     Referred to Provider:   Arcelia Mcclain, APRN     Number of Visits Requested:   1    ECG 12 Lead     This order was created via procedure documentation     Order Specific Question:   Release to patient     Answer:   Routine Release [[id number]]       No follow-ups on file.        BMI for Adults  What is BMI?  Body mass index (BMI) is a number that is calculated from a person's weight and height. BMI can help estimate how much of a person's weight is composed of fat. BMI does not measure body fat directly. Rather, it is an  "alternative to procedures that directly measure body fat, which can be difficult and expensive.  BMI can help identify people who may be at higher risk for certain medical problems.  What are BMI measurements used for?  BMI is used as a screening tool to identify possible weight problems. It helps determine whether a person is obese, overweight, a healthy weight, or underweight.  BMI is useful for:  Identifying a weight problem that may be related to a medical condition or may increase the risk for medical problems.  Promoting changes, such as changes in diet and exercise, to help reach a healthy weight. BMI screening can be repeated to see if these changes are working.  How is BMI calculated?  BMI involves measuring your weight in relation to your height. Both height and weight are measured, and the BMI is calculated from those numbers. This can be done either in English (U.S.) or metric measurements. Note that charts and online BMI calculators are available to help you find your BMI quickly and easily without having to do these calculations yourself.  To calculate your BMI in English (U.S.) measurements:    Measure your weight in pounds (lb).  Multiply the number of pounds by 703.  For example, for a person who weighs 180 lb, multiply that number by 703, which equals 126,540.  Measure your height in inches. Then multiply that number by itself to get a measurement called \"inches squared.\"  For example, for a person who is 70 inches tall, the \"inches squared\" measurement is 70 inches x 70 inches, which equals 4,900 inches squared.  Divide the total from step 2 (number of lb x 703) by the total from step 3 (inches squared): 126,540 ÷ 4,900 = 25.8. This is your BMI.  To calculate your BMI in metric measurements:  Measure your weight in kilograms (kg).  Measure your height in meters (m). Then multiply that number by itself to get a measurement called \"meters squared.\"  For example, for a person who is 1.75 m tall, the " "\"meters squared\" measurement is 1.75 m x 1.75 m, which is equal to 3.1 meters squared.  Divide the number of kilograms (your weight) by the meters squared number. In this example: 70 ÷ 3.1 = 22.6. This is your BMI.  What do the results mean?  BMI charts are used to identify whether you are underweight, normal weight, overweight, or obese. The following guidelines will be used:  Underweight: BMI less than 18.5.  Normal weight: BMI between 18.5 and 24.9.  Overweight: BMI between 25 and 29.9.  Obese: BMI of 30 or above.  Keep these notes in mind:  Weight includes both fat and muscle, so someone with a muscular build, such as an athlete, may have a BMI that is higher than 24.9. In cases like these, BMI is not an accurate measure of body fat.  To determine if excess body fat is the cause of a BMI of 25 or higher, further assessments may need to be done by a health care provider.  BMI is usually interpreted in the same way for men and women.  Where to find more information  For more information about BMI, including tools to quickly calculate your BMI, go to these websites:  Centers for Disease Control and Prevention: www.cdc.gov  American Heart Association: www.heart.org  National Heart, Lung, and Blood [place]: www.nhlbi.nih.gov  Summary  Body mass index (BMI) is a number that is calculated from a person's weight and height.  BMI may help estimate how much of a person's weight is composed of fat. BMI can help identify those who may be at higher risk for certain medical problems.  BMI can be measured using English measurements or metric measurements.  BMI charts are used to identify whether you are underweight, normal weight, overweight, or obese.  This information is not intended to replace advice given to you by your health care provider. Make sure you discuss any questions you have with your health care provider.  Document Revised: 09/09/2020 Document Reviewed: 07/17/2020  Elsevier Patient Education © 2023 Elsevier " Inc.

## 2023-09-12 ENCOUNTER — TELEPHONE (OUTPATIENT)
Dept: INTERNAL MEDICINE | Facility: CLINIC | Age: 88
End: 2023-09-12
Payer: MEDICARE

## 2023-09-12 NOTE — TELEPHONE ENCOUNTER
Ok noted, but Arcelia did tell me she could not see the patient, as she has an appt with Dr. Almodovar on the 18th...

## 2023-09-12 NOTE — TELEPHONE ENCOUNTER
I have tried to work her into cardiology, however that appt was rejected since she has an existing appt with cardiologist on the 18th. We could call Dr. Leo office to see if she can be worked in any sooner this week, since she is symptomatic.

## 2023-09-12 NOTE — TELEPHONE ENCOUNTER
"Patient's daughter Carlotta called to follow up on the EKG done recently and the patient portal message sent 9/9/23    States that Zamzam was going to have someone in cardiology look over the EKG that had been done. Mentioned the patient having had left sided neck pain, occasional pain between shoulder blades (most recently happened Friday 9/8 after a walk), fatigue, brain fog, emotional problem.    She would like to know what they need to do from here, already has appointment with electrophysiologist on 9/18/23 and doesn't know if there's anything that indicates she needs to be seen sooner and next steps. Mentioned that she thinks it may be \"a plumbing issue not an electrical issue with her heart\"    Please call back  Carlotta  [phone]  "

## 2023-09-13 ENCOUNTER — OFFICE VISIT (OUTPATIENT)
Dept: CARDIOLOGY | Facility: HOSPITAL | Age: 88
End: 2023-09-13
Payer: MEDICARE

## 2023-09-13 VITALS
DIASTOLIC BLOOD PRESSURE: 60 MMHG | BODY MASS INDEX: 23.66 KG/M2 | HEIGHT: 62 IN | HEART RATE: 60 BPM | WEIGHT: 128.56 LBS | SYSTOLIC BLOOD PRESSURE: 131 MMHG | TEMPERATURE: 98.3 F | RESPIRATION RATE: 17 BRPM | OXYGEN SATURATION: 98 %

## 2023-09-13 DIAGNOSIS — R53.83 OTHER FATIGUE: ICD-10-CM

## 2023-09-13 DIAGNOSIS — R42 LIGHTHEADEDNESS: ICD-10-CM

## 2023-09-13 DIAGNOSIS — I48.21 PERMANENT ATRIAL FIBRILLATION: ICD-10-CM

## 2023-09-13 DIAGNOSIS — R07.89 OTHER CHEST PAIN: Primary | ICD-10-CM

## 2023-09-13 DIAGNOSIS — R06.09 DOE (DYSPNEA ON EXERTION): ICD-10-CM

## 2023-09-13 DIAGNOSIS — Z95.0 CARDIAC PACEMAKER IN SITU: ICD-10-CM

## 2023-09-13 RX ORDER — CHOLECALCIFEROL (VITAMIN D3) 125 MCG
5 CAPSULE ORAL NIGHTLY PRN
COMMUNITY

## 2023-09-13 RX ORDER — MULTIVIT WITH MINERALS/LUTEIN
1000 TABLET ORAL DAILY
COMMUNITY

## 2023-09-13 RX ORDER — B-COMPLEX WITH VITAMIN C
1 TABLET ORAL DAILY
COMMUNITY

## 2023-09-13 RX ORDER — ACETAMINOPHEN 160 MG
2000 TABLET,DISINTEGRATING ORAL DAILY
COMMUNITY

## 2023-09-13 RX ORDER — AMOXICILLIN 500 MG
1200 CAPSULE ORAL DAILY
COMMUNITY

## 2023-09-13 NOTE — PROGRESS NOTES
"Chief Complaint  Atrial Fibrillation, Fatigue, and Establish Care    Subjective    History of Present Illness {CC  Problem List  Visit  Diagnosis   Encounters  Notes  Medications  Labs  Result Review Imaging  Media :23}       History of Present Illness   88-year-old female presents to the office today at the request of her PCP.  She follows with Dr. Almodovar for her permanent atrial fibrillation and sick sinus syndrome with [employer] permanent pacemaker.  She also has a history of mixed hyperlipidemia, type 2 diabetes mellitus, acquired hypothyroidism.  She is anticoagulated with Eliquis and denies any signs and symptoms of bleeding.  She reports over the last few months she has been experiencing upper abdominal/lower chest pain with movement as well as pain into her jaw and upper back.  Pain in her upper back usually occurs with ambulation.  She also reports dyspnea on exertion as well as significant fatigue.  Patient reports she is very tearful and feels like she can cry at any moment.  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  Her  had COVID in December 2022 and requires care now.  History of an echo in 2020 that showed mild to moderate MR, EF 56 to 60%, mild concentric hypertrophy  Objective     Vital Signs:   Vitals:    09/13/23 1413 09/13/23 1415 09/13/23 1416   BP: 136/66 130/63 131/60   BP Location: Right arm Left arm Left arm   Patient Position: Sitting Standing Sitting   Cuff Size: Adult Adult Adult   Pulse: 97 60 60   Resp:   17   Temp:   98.3 °F (36.8 °C)   TempSrc:   Temporal   SpO2: 97% 98% 98%   Weight:   58.3 kg (128 lb 9 oz)   Height:   157.5 cm (62.01\")     Body mass index is 23.51 kg/m².  Physical Exam  Vitals and nursing note reviewed.   Constitutional:       Appearance: Normal appearance.   HENT:      Head: Normocephalic.   Eyes:      Pupils: Pupils are equal, round, and reactive to light.   Cardiovascular:      Rate and Rhythm: Normal rate and regular rhythm.    "   Pulses: Normal pulses.      Heart sounds: Normal heart sounds. No murmur heard.  Pulmonary:      Effort: Pulmonary effort is normal.      Breath sounds: Normal breath sounds.   Abdominal:      General: Bowel sounds are normal.      Palpations: Abdomen is soft.   Musculoskeletal:         General: Normal range of motion.      Cervical back: Normal range of motion.      Right lower leg: No edema.      Left lower leg: No edema.   Skin:     General: Skin is warm and dry.      Capillary Refill: Capillary refill takes less than 2 seconds.   Neurological:      Mental Status: She is alert and oriented to person, place, and time.   Psychiatric:         Mood and Affect: Mood normal.         Thought Content: Thought content normal.            Result Review  Data Reviewed:{ Labs  Result Review  Imaging  Med Tab  Media :23}    echo in 2020 that showed mild to moderate MR, EF 56 to 60%, mild concentric hypertrophy  T3, Free (08/31/2023 08:28)  T4, Free (08/31/2023 08:28)  TSH (08/31/2023 08:28)  MicroAlbumin, Urine, Random - Urine, Clean Catch (08/31/2023 08:28)  Lipid Panel (08/31/2023 08:28)  Hemoglobin A1c (08/31/2023 08:28)  Comprehensive Metabolic Panel (08/31/2023 08:28)  CBC & Differential (08/31/2023 08:28)           Assessment and Plan {CC Problem List  Visit Diagnosis  ROS  Review (Popup)  Health Maintenance  Quality  BestPractice  Medications  SmartSets  SnapShot Encounters  Media :23}   1. Other chest pain  Cardiac risk factors includes mixed hyperlipidemia, type 2 diabetes mellitus, age  - Stress Test With Myocardial Perfusion (1 Day); Future  Lexiscan due to permanent pacemaker  2. MATHEW (dyspnea on exertion)    - Adult Transthoracic Echo Complete W/ Cont if Necessary Per Protocol; Future  - Stress Test With Myocardial Perfusion (1 Day); Future    3. Other fatigue    - Adult Transthoracic Echo Complete W/ Cont if Necessary Per Protocol; Future  - Stress Test With Myocardial Perfusion (1 Day);  Future    4. Cardiac pacemaker in situ    - Adult Transthoracic Echo Complete W/ Cont if Necessary Per Protocol; Future  - Stress Test With Myocardial Perfusion (1 Day); Future    5. Permanent atrial fibrillation  CHADS-VASc Risk Assessment              4 Total Score    2 Age >/= 75    1 DM    1 Sex: Female        Criteria that do not apply:    CHF    Hypertension    PRIOR STROKE/TIA/THROMBO    Vascular Disease    Age 65-74          Anticoagulated with eliquis and denies any s/s of bleeding   - Adult Transthoracic Echo Complete W/ Cont if Necessary Per Protocol; Future  - Stress Test With Myocardial Perfusion (1 Day); Future    6. Lightheadedness    - Stress Test With Myocardial Perfusion (1 Day); Future          Follow Up {Instructions Charge Capture  Follow-up Communications :23}   Return if symptoms worsen or fail to improve.    Patient was given instructions and counseling regarding her condition or for health maintenance advice. Please see specific information pulled into the AVS if appropriate.  Patient was instructed to call the [facility] with any questions, concerns, or worsening symptoms.

## 2023-09-18 ENCOUNTER — OFFICE VISIT (OUTPATIENT)
Dept: CARDIOLOGY | Facility: CLINIC | Age: 88
End: 2023-09-18
Payer: MEDICARE

## 2023-09-18 VITALS
OXYGEN SATURATION: 93 % | DIASTOLIC BLOOD PRESSURE: 58 MMHG | WEIGHT: 128 LBS | BODY MASS INDEX: 22.68 KG/M2 | HEIGHT: 63 IN | HEART RATE: 60 BPM | SYSTOLIC BLOOD PRESSURE: 118 MMHG

## 2023-09-18 DIAGNOSIS — I44.2 COMPLETE HEART BLOCK: Primary | ICD-10-CM

## 2023-09-18 DIAGNOSIS — I48.21 PERMANENT ATRIAL FIBRILLATION: ICD-10-CM

## 2023-09-18 DIAGNOSIS — Z79.01 CHRONIC ANTICOAGULATION: ICD-10-CM

## 2023-09-18 DIAGNOSIS — Z95.0 CARDIAC PACEMAKER IN SITU: ICD-10-CM

## 2023-09-18 PROBLEM — I49.5 SICK SINUS SYNDROME: Status: RESOLVED | Noted: 2020-06-15 | Resolved: 2023-09-18

## 2023-09-18 PROBLEM — R53.83 FATIGUE: Status: RESOLVED | Noted: 2023-09-10 | Resolved: 2023-09-18

## 2023-09-18 PROBLEM — R42 DIZZINESS: Status: RESOLVED | Noted: 2023-09-10 | Resolved: 2023-09-18

## 2023-09-18 PROCEDURE — 99214 OFFICE O/P EST MOD 30 MIN: CPT | Performed by: INTERNAL MEDICINE

## 2023-09-18 PROCEDURE — 93279 PRGRMG DEV EVAL PM/LDLS PM: CPT | Performed by: INTERNAL MEDICINE

## 2023-09-18 NOTE — PROGRESS NOTES
Cardiac Electrophysiology Outpatient Follow Up Note            [facility]    Follow Up Office Visit      Carli Carver  [id number]  09/18/2023  [unfilled]  [unfilled]    Primary Care Physician: Zamzam Amador PA-C    Referred By: No ref. provider found    Subjective     Chief Complaint:   Diagnoses and all orders for this visit:    1. Complete heart block (HCC) (Primary)    2. Permanent atrial fibrillation (HCC)    3. Cardiac pacemaker in situ    4. Chronic anticoagulation      Chief Complaint   Patient presents with    Complete heart block     6 month FU    Pacemaker Check    Atrial Fibrillation       History of Present Illness:   Carli Carver is a 88 y.o. female who presents to my electrophysiology clinic for follow up of above complaints.  Notes some neck pain and discomfort.  Has been scheduled for a stress test and echocardiogram by her cardiology team..      Past Medical History:   Past Medical History:   Diagnosis Date    A-fib     Atrial flutter     Bradycardia     Cataract     Constipation     Heartburn     Hyperlipidemia     Hypothyroid     Wears glasses        Past Surgical History:   Past Surgical History:   Procedure Laterality Date    ABLATION OF DYSRHYTHMIC FOCUS      CARDIAC ELECTROPHYSIOLOGY PROCEDURE N/A 7/15/2020    Procedure: AV node ablation- pt has SJM pacemaker;  Surgeon: Eric Almodovar DO;  Location: [facility] INVASIVE LOCATION;  Service: Cardiovascular;  Laterality: N/A;    CARDIOVERSION      CATARACT EXTRACTION Right     CATARACT EXTRACTION      COLONOSCOPY      INSERT / REPLACE / REMOVE PACEMAKER  08/29/2014    [employer], Location: [facility]    TUBAL ABDOMINAL LIGATION         Family History:   Family History   Problem Relation Age of Onset    Lung cancer Mother     Heart disease Brother        Social History:   Social History     Socioeconomic History    Marital status:    Tobacco Use     "Smoking status: Never    Smokeless tobacco: Never   Vaping Use    Vaping Use: Never used   Substance and Sexual Activity    Alcohol use: No    Drug use: No    Sexual activity: Defer       Medications:     Current Outpatient Medications:     apixaban (Eliquis) 5 MG tablet tablet, TAKE ONE TABLET BY MOUTH TWICE A DAY, Disp: 60 tablet, Rfl: 6    ascorbic acid (VITAMIN C) 1000 MG tablet, Take 1 tablet by mouth Daily., Disp: , Rfl:     atorvastatin (LIPITOR) 20 MG tablet, TAKE ONE TABLET BY MOUTH ONCE NIGHTLY, Disp: 90 tablet, Rfl: 1    B Complex Vitamins (Vitamin B Complex) tablet, Take 1 tablet by mouth Daily., Disp: , Rfl:     BIOTIN PO, Take 5,000 mcg by mouth Daily., Disp: , Rfl:     Calcium-Magnesium-Vitamin D (CALCIUM 1200+D3 PO), Take 1 tablet by mouth Daily. 1000 units d3, Disp: , Rfl:     Cholecalciferol (Vitamin D3) 50 MCG (2000 UT) capsule, Take 1 capsule by mouth Daily., Disp: , Rfl:     docusate sodium (COLACE) 100 MG capsule, Take 1 capsule by mouth As Needed., Disp: , Rfl:     levothyroxine (SYNTHROID, LEVOTHROID) 88 MCG tablet, TAKE ONE TABLET BY MOUTH DAILY, Disp: 30 tablet, Rfl: 5    Magnesium 500 MG tablet, Take 1 tablet by mouth Daily., Disp: , Rfl:     melatonin 5 MG tablet tablet, Take 1 tablet by mouth At Night As Needed., Disp: , Rfl:     Multiple Vitamins-Minerals (MULTIVITAMIN ADULT PO), Take 1 tablet by mouth Daily., Disp: , Rfl:     Omega-3 Fatty Acids (fish oil) 1200 MG capsule capsule, Take 1 capsule by mouth Daily., Disp: , Rfl:     Allergies:   No Known Allergies    Objective   Vital Signs:   Vitals:    09/18/23 1030   BP: 118/58   BP Location: Left arm   Patient Position: Sitting   Cuff Size: Adult   Pulse: 60   SpO2: 93%   Weight: 58.1 kg (128 lb)   Height: 160 cm (63\")       PHYSICAL EXAM  General appearance: Awake, alert, cooperative  Head: Normocephalic, without obvious abnormality, atraumatic  Eyes: Conjunctivae/corneas clear, EOMs intact  Neck: no adenopathy, no carotid bruit, no " "JVD, and thyroid: not enlarged  Lungs: clear to auscultation bilaterally and no rhonchi or crackles\", ' symmetric  Heart: regular rate and rhythm, S1, S2 normal, no murmur, click, rub or gallop  Abdomen: Soft, non-tender, bowel sounds normal,  no organomegaly  Extremities: extremities normal, atraumatic, no cyanosis or edema  Skin: Skin color, turgor normal, no rashes or lesions  Neurologic: Grossly normal     Lab Results   Component Value Date    GLUCOSE 113 (H) 08/31/2023    CALCIUM 10.5 08/31/2023     08/31/2023    K 4.4 08/31/2023    CO2 25.0 08/31/2023     08/31/2023    BUN 19 08/31/2023    CREATININE 0.92 08/31/2023    EGFRIFNONA 63 07/12/2020    BCR 20.7 08/31/2023    ANIONGAP 11.0 08/31/2023     Lab Results   Component Value Date    WBC 5.01 08/31/2023    HGB 15.1 08/31/2023    HCT 42.6 08/31/2023    MCV 93.0 08/31/2023     08/31/2023     No results found for: INR, PROTIME  Lab Results   Component Value Date    TSH 0.514 08/31/2023       Cardiac Testing:     I personally viewed and interpreted the patient's EKG/Telemetry/lab data    Procedures    Tobacco Cessation: N/A  Obstructive Sleep Apnea Screening: Completed    Advance Care Planning   ACP discussion was declined by the patient. Patient does not have an advance directive, declines further assistance.           Assessment & Plan    Diagnoses and all orders for this visit:    1. Complete heart block (HCC) (Primary)    2. Permanent atrial fibrillation (HCC)    3. Cardiac pacemaker in situ    4. Chronic anticoagulation         Diagnosis Plan   1. Complete heart block (HCC)  Pacemaker dependent.  Underlying rhythm today is atrial fibrillation with complete heart block.    Definitive rate control    Doing well.      2. Permanent atrial fibrillation (HCC)  Long-term anticoagulation.  Tolerating it well.  Apixaban 5 mg orally twice daily.      3. Cardiac pacemaker in situ  [employer] permanent pacemaker in situ.  VVIR.  Rate responsivity is " approximated chronotropic competency.  Excellent remaining battery longevity.  The device was initially implanted August 29, 2014.  Estimated remaining longevity is 1.4 years.  This is of course a conservative estimate.  She is on home monitoring and actively transmitting.    Lengthy conversation with the patient and daughter about what to do as we approached the DIMA.  We will keep an eye on things accordingly and appropriately.  We will have her come back and see us in clinic in 9 months.  Home monitoring will continue.  Right-sided pacemaker.      4. Chronic anticoagulation  Primary primary prevention of stroke.        Body mass index is 22.67 kg/m².    I spent 38 minutes in consultation with this patient which included more than 65% of this time in direct face-to-face counseling, physical examination and discussion of my assessment and findings and this shared decision making with the patient.  The remainder of the time not spent face-to-face was performing one, some or all of the following actions: preparing to see the patient (e.g. reviewing tests, prior clinicians' notes, etc), ordering medications, tests or procedures, coordination of care, discussion of the plan with other healthcare providers, documenting clinical information in epic as well as independently interpreting results and communication of these results to the patient family and/or caregiver(s).  Please note that this explicitly excludes time spent on other separate billable services such as performing procedures or test interpretation, when applicable.      Follow Up:       Thank you for allowing me to participate in the care of your patient. Please to not hesitate to contact me with additional questions or concerns.      Eric Almodovar, DO, FACC, RS  Cardiac Electrophysiologist  [facility]

## 2023-09-18 NOTE — PROGRESS NOTES
"        Encounter Date:09/18/2023      Patient ID: Carli Carver is a 88 y.o. female.    Zamzam Amador PA-C    Chief Complaint: Complete heart block (6 month FU), Pacemaker Check, and Atrial Fibrillation      PROBLEM LIST:  Patient Active Problem List    Diagnosis Date Noted    Permanent atrial fibrillation (HCC) 08/23/2021     Priority: High     Note Last Updated: 3/6/2023     AV node catheter ablation, 7/15/2020      Complete heart block (HCC) 08/17/2020     Priority: High     Note Last Updated: 9/18/2023     [employer] dual-chamber permanent pacemaker, 8/29/2014  VVIR for permanent A-fib      Cardiac pacemaker in situ 06/15/2020     Priority: Medium    Mixed hyperlipidemia 05/27/2022     Priority: Low    Chronic anticoagulation 08/23/2021     Priority: Low    Acquired hypothyroidism 05/27/2022    Type II diabetes mellitus 05/27/2022               History of Present Illness  Patient presents today for follow-up with a history of     No Known Allergies    Current Outpatient Medications   Medication Instructions    apixaban (Eliquis) 5 MG tablet tablet TAKE ONE TABLET BY MOUTH TWICE A DAY    ascorbic acid (VITAMIN C) 1,000 mg, Oral, Daily    atorvastatin (LIPITOR) 20 MG tablet TAKE ONE TABLET BY MOUTH ONCE NIGHTLY    B Complex Vitamins (Vitamin B Complex) tablet 1 tablet, Oral, Daily    BIOTIN PO 5,000 mcg, Oral, Daily    Calcium-Magnesium-Vitamin D (CALCIUM 1200+D3 PO) 1 tablet, Oral, Daily, 1000 units d3     docusate sodium (COLACE) 100 mg, Oral, As Needed    fish oil 1,200 mg, Oral, Daily    levothyroxine (SYNTHROID, LEVOTHROID) 88 MCG tablet TAKE ONE TABLET BY MOUTH DAILY    Magnesium 500 mg, Oral, Daily    melatonin 5 mg, Oral, Nightly PRN    Multiple Vitamins-Minerals (MULTIVITAMIN ADULT PO) 1 tablet, Oral, Daily    Vitamin D3 2,000 Units, Oral, Daily       .    Objective:     /58 (BP Location: Left arm, Patient Position: Sitting, Cuff Size: Adult)   Pulse 60   Ht 160 cm (63\")   Wt " 58.1 kg (128 lb)   SpO2 93%   BMI 22.67 kg/m²    Body mass index is 22.67 kg/m².     Constitutional:       Appearance: Well-developed.   Pulmonary:      Effort: Pulmonary effort is normal. No respiratory distress.      Breath sounds: Normal breath sounds. No wheezing. No rales.      Comments: Bases clear  Chest:      Chest wall: Not tender to palpatation.   Cardiovascular:      Normal rate. Regular rhythm.      Murmurs: There is no murmur.      No gallop.  No click. No rub.   Pulses:     Intact distal pulses.   Edema:     Peripheral edema absent.   Musculoskeletal: Normal range of motion.     Lab Review:                 TSH          3/2/2023    10:52 8/31/2023    08:28   TSH   TSH 0.425  0.514              Procedures               Assessment:      Diagnosis Plan   1. Complete heart block (HCC)        2. Permanent atrial fibrillation (HCC)        3. Cardiac pacemaker in situ        4. Chronic anticoagulation          Plan:     Stable cardiac status.  Continue current medications.   {CARDIO RETURN TO CLINIC:95857}, sooner as needed.  Thank you for allowing us to participate in the care of your patient.     ***

## 2023-10-02 ENCOUNTER — TELEPHONE (OUTPATIENT)
Dept: CARDIOLOGY | Facility: HOSPITAL | Age: 88
End: 2023-10-02
Payer: MEDICARE

## 2023-10-02 DIAGNOSIS — R07.89 OTHER CHEST PAIN: Primary | ICD-10-CM

## 2023-10-02 DIAGNOSIS — R94.39 ABNORMAL NUCLEAR STRESS TEST: ICD-10-CM

## 2023-10-02 DIAGNOSIS — I44.2 COMPLETE HEART BLOCK: ICD-10-CM

## 2023-10-02 DIAGNOSIS — R53.83 OTHER FATIGUE: ICD-10-CM

## 2023-10-02 DIAGNOSIS — R06.09 DOE (DYSPNEA ON EXERTION): ICD-10-CM

## 2023-10-02 DIAGNOSIS — I48.21 PERMANENT ATRIAL FIBRILLATION: ICD-10-CM

## 2023-10-02 DIAGNOSIS — R42 LIGHTHEADEDNESS: ICD-10-CM

## 2023-10-02 DIAGNOSIS — Z79.01 CHRONIC ANTICOAGULATION: ICD-10-CM

## 2023-10-02 DIAGNOSIS — I10 ESSENTIAL HYPERTENSION: ICD-10-CM

## 2023-10-02 DIAGNOSIS — Z95.0 CARDIAC PACEMAKER IN SITU: ICD-10-CM

## 2023-10-02 RX ORDER — NITROGLYCERIN 0.4 MG/1
TABLET SUBLINGUAL
Qty: 100 TABLET | Refills: 11 | Status: SHIPPED | OUTPATIENT
Start: 2023-10-02

## 2023-10-02 RX ORDER — AMLODIPINE BESYLATE 2.5 MG/1
2.5 TABLET ORAL DAILY
Qty: 30 TABLET | Refills: 11 | Status: SHIPPED | OUTPATIENT
Start: 2023-10-02

## 2023-10-02 RX ORDER — ASPIRIN 81 MG/1
81 TABLET ORAL DAILY
Qty: 30 TABLET | Refills: 3
Start: 2023-10-02

## 2023-10-02 NOTE — TELEPHONE ENCOUNTER
Echo completed at [place] [facility] 9/27/2023 showed mild concentric hypertrophy EF 60%, left atrium dilated, RV normal size and function, right atrium dilated, mild AR, mild MR, mild TR, mild pulmonic regurgitation  Malaika scan 9/27/2023 at [facility] showed a large sized severe intensity reversible apical ischemia, EF 68%  Did discuss these findings with patient's daughter Carlotta who who is her power of .  Begin aspirin 81 mg daily, begin amlodipine 2.5 mg daily and may use nitro as needed  Reviewed use of nitroglycerin and when to present to the ER.  Urgent referral to cardiology for further evaluation

## 2023-10-04 ENCOUNTER — OFFICE VISIT (OUTPATIENT)
Dept: CARDIOLOGY | Facility: CLINIC | Age: 88
End: 2023-10-04
Payer: MEDICARE

## 2023-10-04 VITALS
BODY MASS INDEX: 23.66 KG/M2 | HEART RATE: 65 BPM | SYSTOLIC BLOOD PRESSURE: 122 MMHG | HEIGHT: 62 IN | WEIGHT: 128.6 LBS | OXYGEN SATURATION: 97 % | DIASTOLIC BLOOD PRESSURE: 64 MMHG

## 2023-10-04 DIAGNOSIS — R53.82 CHRONIC FATIGUE: ICD-10-CM

## 2023-10-04 DIAGNOSIS — E78.2 MIXED HYPERLIPIDEMIA: Chronic | ICD-10-CM

## 2023-10-04 DIAGNOSIS — R06.09 DYSPNEA ON EXERTION: Primary | ICD-10-CM

## 2023-10-04 DIAGNOSIS — Z86.79 HISTORY OF PERISTENT ATRIAL FIBRILLATION: Chronic | ICD-10-CM

## 2023-10-04 DIAGNOSIS — Z79.01 CHRONIC ANTICOAGULATION: ICD-10-CM

## 2023-10-04 NOTE — PROGRESS NOTES
New Patient     Name: Carli Carver    : 1935     MRN: [id number]     DOS: 10/4/2023  Referred By: Arcelia Mcclain APRN    Chief Complaint  Chest Pain (NP), Shortness of Breath (NP), and Fatigue (NP)    Subjective     History of Present Illness:  Carli Carver is a 88 y.o. female who presents today for evaluation of shortness of breath and fatigue. She is present with her daughter today.    Mrs. Carver has been dealing with taking care of her  at home for the past 10 months.  He is 96 and on hospice and suffering from sequela of COVID.  During this time, she is become more fatigued, more emotional, has sporadic right scapular pain, and noticed worsening shortness of breath.  This has persisted throughout the summer and is led to an echocardiogram, evaluation of her pacemaker, and a stress test.  She has no mark chest pain or chest pressure.  She has no palpitations.  She is not lightheaded or dizzy.  She continues to do things around the house, and do things outside of the house like walk around the neighborhood or shop and sometimes has shortness of breath during this.      Objective     Past Medical History:   Diagnosis Date    A-fib     Atrial flutter     Bradycardia     Cataract     Constipation     Heartburn     Hyperlipidemia     Hypothyroid     Wears glasses      Past Surgical History:   Procedure Laterality Date    ABLATION OF DYSRHYTHMIC FOCUS      CARDIAC ELECTROPHYSIOLOGY PROCEDURE N/A 7/15/2020    Procedure: AV node ablation- pt has SJM pacemaker;  Surgeon: Eric Almodovar DO;  Location: [facility] INVASIVE LOCATION;  Service: Cardiovascular;  Laterality: N/A;    CARDIOVERSION      CATARACT EXTRACTION Right     CATARACT EXTRACTION      COLONOSCOPY      INSERT / REPLACE / REMOVE PACEMAKER  2014    [facility], Location: [facility]    TUBAL ABDOMINAL LIGATION       Family History   Problem Relation Age of Onset    Lung cancer Mother     Heart disease Brother   "    Social History     Socioeconomic History    Marital status:    Tobacco Use    Smoking status: Never     Passive exposure: Never    Smokeless tobacco: Never   Vaping Use    Vaping Use: Never used   Substance and Sexual Activity    Alcohol use: No    Drug use: Never    Sexual activity: Defer     Current Outpatient Medications on File Prior to Visit   Medication Sig Dispense Refill    amLODIPine (NORVASC) 2.5 MG tablet Take 1 tablet by mouth Daily. 30 tablet 11    apixaban (Eliquis) 5 MG tablet tablet TAKE ONE TABLET BY MOUTH TWICE A DAY 60 tablet 6    ascorbic acid (VITAMIN C) 1000 MG tablet Take 1 tablet by mouth Daily.      aspirin 81 MG EC tablet Take 1 tablet by mouth Daily. 30 tablet 3    atorvastatin (LIPITOR) 20 MG tablet TAKE ONE TABLET BY MOUTH ONCE NIGHTLY 90 tablet 1    B Complex Vitamins (Vitamin B Complex) tablet Take 1 tablet by mouth Daily.      BIOTIN PO Take 5,000 mcg by mouth Daily.      Calcium-Magnesium-Vitamin D (CALCIUM 1200+D3 PO) Take 1 tablet by mouth Daily. 1000 units d3      Cholecalciferol (Vitamin D3) 50 MCG (2000 UT) capsule Take 1 capsule by mouth Daily.      docusate sodium (COLACE) 100 MG capsule Take 1 capsule by mouth As Needed.      levothyroxine (SYNTHROID, LEVOTHROID) 88 MCG tablet TAKE ONE TABLET BY MOUTH DAILY 30 tablet 5    Magnesium 500 MG tablet Take 1 tablet by mouth Daily.      melatonin 5 MG tablet tablet Take 1 tablet by mouth At Night As Needed.      Multiple Vitamins-Minerals (MULTIVITAMIN ADULT PO) Take 1 tablet by mouth Daily.      nitroglycerin (NITROSTAT) 0.4 MG SL tablet 1 under the tongue as needed for angina, may repeat q5mins for up three doses 100 tablet 11    Omega-3 Fatty Acids (fish oil) 1200 MG capsule capsule Take 1 capsule by mouth Daily.       No current facility-administered medications on file prior to visit.         Vital Signs  /64 (BP Location: Left arm, Patient Position: Sitting, Cuff Size: Adult)   Pulse 65   Ht 157.5 cm (62\")  " " Wt 58.3 kg (128 lb 9.6 oz)   SpO2 97%   BMI 23.52 kg/m²   Estimated body mass index is 23.52 kg/m² as calculated from the following:    Height as of this encounter: 157.5 cm (62\").    Weight as of this encounter: 58.3 kg (128 lb 9.6 oz).    Vitals and nursing note reviewed.   Constitutional:       Appearance: Healthy appearance. Not in distress.   Eyes:      Conjunctiva/sclera: Conjunctivae normal.   HENT:    Mouth/Throat:      Dentition: Normal.      Pharynx: Oropharynx is clear.   Neck:      Vascular: JVD normal.   Pulmonary:      Effort: Pulmonary effort is normal.      Breath sounds: Normal breath sounds.   Cardiovascular:      PMI at left midclavicular line. Normal rate. Regular rhythm.      Murmurs: There is no murmur.      No gallop.  No click. No rub.   Pulses:     Intact distal pulses.   Edema:     Peripheral edema absent.   Musculoskeletal: Normal range of motion.      Cervical back: Normal range of motion and neck supple. Skin:     General: Skin is warm and dry.   Neurological:      Mental Status: Alert and oriented to person, place and time.     \    Results (if applicable):  Lab Results   Component Value Date    CHOL 142 08/31/2023    CHLPL 142 08/06/2014    TRIG 90 08/31/2023    HDL 60 08/31/2023    LDL 65 08/31/2023     Lab Results   Component Value Date    GLUCOSE 113 (H) 08/31/2023    CALCIUM 10.5 08/31/2023     08/31/2023    K 4.4 08/31/2023    CO2 25.0 08/31/2023     08/31/2023    BUN 19 08/31/2023    CREATININE 0.92 08/31/2023    EGFR 60.8 08/31/2023    BCR 20.7 08/31/2023    ANIONGAP 11.0 08/31/2023     Lab Results   Component Value Date    WBC 5.01 08/31/2023    HGB 15.1 08/31/2023    HCT 42.6 08/31/2023    MCV 93.0 08/31/2023     08/31/2023      Lab Results   Component Value Date    HGBA1C 6.80 (H) 08/31/2023     Per note from Arcelia Mcclain 10/2/2023    Echo completed at [place] [facility] 9/27/2023 showed mild concentric hypertrophy EF 60%, left atrium dilated, " RV normal size and function, right atrium dilated, mild AR, mild MR, mild TR, mild pulmonic regurgitation  Malaika scan 9/27/2023 at [place] [facility] showed a large sized severe intensity reversible apical ischemia, EF 68%  Did discuss these findings with patient's daughter Carlotta who who is her power of .  Begin aspirin 81 mg daily, begin amlodipine 2.5 mg daily and may use nitro as needed         ECG 12 Lead    Date/Time: 10/4/2023 3:34 PM  Performed by: Owen Wood III, MD  Authorized by: Owen Wood III, MD   Comparison: compared with previous ECG from 9/10/2023  Similar to previous ECG  Rhythm: paced  Rate: normal    Clinical impression: abnormal EKG        Assessment and Plan     Carli Carver is a 88 y.o. female who presents today for the aforementioned problems.     Diagnoses and all orders for this visit:    1. Dyspnea on exertion (Primary)    2. Chronic fatigue    3. Mixed hyperlipidemia    4. Chronic anticoagulation    5. History of peristent atrial fibrillation    Other orders  -     ECG 12 Lead      This is a pleasant 88-year-old female with a history of persistent A-fib, sick sinus syndrome, and AV gaudencio ablation status post pacemaker.  Recent evaluation of her pacemaker by Dr. Almodovar shows normal function with 99% V paced.  Recent echocardiogram, per report we are awaiting the actual report and imaging, shows normal left ventricular ejection fraction with normal RV function.  She also had a recent stress test, again per report, showed apical ischemia.  Following this she was started on amlodipine 2.5 mg daily the first day of which is today.  They are mainly concerned about the etiology of her symptoms and the potential need for a coronary angiogram.  We discussed that at this time I do not have the actual report for her echocardiogram or for her Lexiscan, but that either way she would need to be on medical therapy with persistent symptoms prior to proceeding with a  coronary angiogram.  I recommended that she continue on the amlodipine 2.5 mg daily for the next couple weeks and see how she feels with this.  Should she continue be symptomatic, we could initiate a second antianginal therapy for benefit.  Ultimately, if she continues to be symptomatic despite multiple antianginal therapies we could do a coronary angiogram to evaluate her coronary anatomy but for dyspnea this would be a last resort.    Advance Care Planning   ACP discussion was held with the patient during this visit. Patient has an advance directive in EMR which is still valid.         Tobacco Use Counseling:  Carli Carver  reports that she has never smoked. She has never been exposed to tobacco smoke. She has never used smokeless tobacco.       Summary  Medication changes: None  Diagnostic Studies ordered: None  RTC 6 weeks to assess symptoms

## 2023-10-04 NOTE — PATIENT INSTRUCTIONS
It was a pleasure seeing you in clinic today.     Medication changes: None    Blood work ordered: None    Diagnostic studies ordered: None

## 2024-01-05 RX ORDER — ATORVASTATIN CALCIUM 20 MG/1
TABLET, FILM COATED ORAL
Qty: 90 TABLET | Refills: 1 | Status: SHIPPED | OUTPATIENT
Start: 2024-01-05

## 2024-01-15 RX ORDER — LEVOTHYROXINE SODIUM 88 UG/1
88 TABLET ORAL DAILY
Qty: 90 TABLET | Refills: 1 | Status: SHIPPED | OUTPATIENT
Start: 2024-01-15

## 2024-02-12 RX ORDER — APIXABAN 5 MG/1
5 TABLET, FILM COATED ORAL 2 TIMES DAILY
Qty: 60 TABLET | Refills: 6 | Status: SHIPPED | OUTPATIENT
Start: 2024-02-12

## 2024-02-21 ENCOUNTER — TELEPHONE (OUTPATIENT)
Dept: CARDIOLOGY | Facility: CLINIC | Age: 89
End: 2024-02-21
Payer: MEDICARE

## 2024-02-21 NOTE — TELEPHONE ENCOUNTER
Was contacted by Dr Horowitz's office to release Mrs Carver's Merlin readings.  Called and verified with Mrs Carver and she said she is going to follow with Dr Horowitz now and it is ok to transfer readings.

## 2024-03-08 ENCOUNTER — LAB (OUTPATIENT)
Dept: LAB | Facility: HOSPITAL | Age: 89
End: 2024-03-08
Payer: MEDICARE

## 2024-03-08 ENCOUNTER — OFFICE VISIT (OUTPATIENT)
Dept: INTERNAL MEDICINE | Facility: CLINIC | Age: 89
End: 2024-03-08
Payer: MEDICARE

## 2024-03-08 VITALS
WEIGHT: 129.6 LBS | BODY MASS INDEX: 23.85 KG/M2 | DIASTOLIC BLOOD PRESSURE: 66 MMHG | SYSTOLIC BLOOD PRESSURE: 112 MMHG | TEMPERATURE: 96.6 F | HEART RATE: 64 BPM | HEIGHT: 62 IN

## 2024-03-08 DIAGNOSIS — E03.9 ACQUIRED HYPOTHYROIDISM: ICD-10-CM

## 2024-03-08 DIAGNOSIS — I10 ESSENTIAL HYPERTENSION: ICD-10-CM

## 2024-03-08 DIAGNOSIS — E11.9 TYPE 2 DIABETES MELLITUS WITHOUT COMPLICATION, WITHOUT LONG-TERM CURRENT USE OF INSULIN: ICD-10-CM

## 2024-03-08 DIAGNOSIS — E78.2 MIXED HYPERLIPIDEMIA: ICD-10-CM

## 2024-03-08 DIAGNOSIS — R73.03 PREDIABETES: ICD-10-CM

## 2024-03-08 DIAGNOSIS — I48.21 PERMANENT ATRIAL FIBRILLATION: Primary | ICD-10-CM

## 2024-03-08 DIAGNOSIS — R42 VERTIGO: ICD-10-CM

## 2024-03-08 DIAGNOSIS — I48.21 PERMANENT ATRIAL FIBRILLATION: ICD-10-CM

## 2024-03-08 LAB
ALBUMIN SERPL-MCNC: 4.6 G/DL (ref 3.5–5.2)
ALBUMIN UR-MCNC: 1.4 MG/DL
ALBUMIN/GLOB SERPL: 1.6 G/DL
ALP SERPL-CCNC: 116 U/L (ref 39–117)
ALT SERPL W P-5'-P-CCNC: 20 U/L (ref 1–33)
ANION GAP SERPL CALCULATED.3IONS-SCNC: 13.7 MMOL/L (ref 5–15)
AST SERPL-CCNC: 24 U/L (ref 1–32)
BASOPHILS # BLD AUTO: 0.01 10*3/MM3 (ref 0–0.2)
BASOPHILS NFR BLD AUTO: 0.2 % (ref 0–1.5)
BILIRUB SERPL-MCNC: 2.1 MG/DL (ref 0–1.2)
BUN SERPL-MCNC: 15 MG/DL (ref 8–23)
BUN/CREAT SERPL: 17.9 (ref 7–25)
CALCIUM SPEC-SCNC: 10.6 MG/DL (ref 8.6–10.5)
CHLORIDE SERPL-SCNC: 104 MMOL/L (ref 98–107)
CHOLEST SERPL-MCNC: 160 MG/DL (ref 0–200)
CO2 SERPL-SCNC: 24.3 MMOL/L (ref 22–29)
CREAT SERPL-MCNC: 0.84 MG/DL (ref 0.57–1)
DEPRECATED RDW RBC AUTO: 43.2 FL (ref 37–54)
EGFRCR SERPLBLD CKD-EPI 2021: 67.4 ML/MIN/1.73
EOSINOPHIL # BLD AUTO: 0.06 10*3/MM3 (ref 0–0.4)
EOSINOPHIL NFR BLD AUTO: 1 % (ref 0.3–6.2)
ERYTHROCYTE [DISTWIDTH] IN BLOOD BY AUTOMATED COUNT: 12.4 % (ref 12.3–15.4)
GLOBULIN UR ELPH-MCNC: 2.9 GM/DL
GLUCOSE BLDC GLUCOMTR-MCNC: 130 MG/DL (ref 70–130)
GLUCOSE SERPL-MCNC: 117 MG/DL (ref 65–99)
HBA1C MFR BLD: 6.4 % (ref 4.8–5.6)
HCT VFR BLD AUTO: 45.2 % (ref 34–46.6)
HDLC SERPL-MCNC: 58 MG/DL (ref 40–60)
HGB BLD-MCNC: 15.5 G/DL (ref 12–15.9)
IMM GRANULOCYTES # BLD AUTO: 0.02 10*3/MM3 (ref 0–0.05)
IMM GRANULOCYTES NFR BLD AUTO: 0.3 % (ref 0–0.5)
LDLC SERPL CALC-MCNC: 84 MG/DL (ref 0–100)
LDLC/HDLC SERPL: 1.42 {RATIO}
LYMPHOCYTES # BLD AUTO: 1.79 10*3/MM3 (ref 0.7–3.1)
LYMPHOCYTES NFR BLD AUTO: 29.5 % (ref 19.6–45.3)
MCH RBC QN AUTO: 32.4 PG (ref 26.6–33)
MCHC RBC AUTO-ENTMCNC: 34.3 G/DL (ref 31.5–35.7)
MCV RBC AUTO: 94.6 FL (ref 79–97)
MONOCYTES # BLD AUTO: 0.57 10*3/MM3 (ref 0.1–0.9)
MONOCYTES NFR BLD AUTO: 9.4 % (ref 5–12)
NEUTROPHILS NFR BLD AUTO: 3.62 10*3/MM3 (ref 1.7–7)
NEUTROPHILS NFR BLD AUTO: 59.6 % (ref 42.7–76)
NRBC BLD AUTO-RTO: 0 /100 WBC (ref 0–0.2)
PLATELET # BLD AUTO: 196 10*3/MM3 (ref 140–450)
PMV BLD AUTO: 10.4 FL (ref 6–12)
POTASSIUM SERPL-SCNC: 4.6 MMOL/L (ref 3.5–5.2)
PROT SERPL-MCNC: 7.5 G/DL (ref 6–8.5)
RBC # BLD AUTO: 4.78 10*6/MM3 (ref 3.77–5.28)
SODIUM SERPL-SCNC: 142 MMOL/L (ref 136–145)
T3FREE SERPL-MCNC: 3.25 PG/ML (ref 2–4.4)
T4 FREE SERPL-MCNC: 1.79 NG/DL (ref 0.93–1.7)
TRIGL SERPL-MCNC: 97 MG/DL (ref 0–150)
TSH SERPL DL<=0.05 MIU/L-ACNC: 0.53 UIU/ML (ref 0.27–4.2)
VIT B12 BLD-MCNC: 735 PG/ML (ref 211–946)
VLDLC SERPL-MCNC: 18 MG/DL (ref 5–40)
WBC NRBC COR # BLD AUTO: 6.07 10*3/MM3 (ref 3.4–10.8)

## 2024-03-08 PROCEDURE — 84443 ASSAY THYROID STIM HORMONE: CPT

## 2024-03-08 PROCEDURE — 80053 COMPREHEN METABOLIC PANEL: CPT

## 2024-03-08 PROCEDURE — 84481 FREE ASSAY (FT-3): CPT

## 2024-03-08 PROCEDURE — 84439 ASSAY OF FREE THYROXINE: CPT

## 2024-03-08 PROCEDURE — 83036 HEMOGLOBIN GLYCOSYLATED A1C: CPT

## 2024-03-08 PROCEDURE — 85025 COMPLETE CBC W/AUTO DIFF WBC: CPT

## 2024-03-08 PROCEDURE — 82607 VITAMIN B-12: CPT

## 2024-03-08 PROCEDURE — 80061 LIPID PANEL: CPT

## 2024-03-08 PROCEDURE — 82043 UR ALBUMIN QUANTITATIVE: CPT

## 2024-03-08 RX ORDER — MECLIZINE HYDROCHLORIDE 25 MG/1
25 TABLET ORAL
Qty: 30 TABLET | Refills: 1 | Status: SHIPPED | OUTPATIENT
Start: 2024-03-08

## 2024-03-08 NOTE — PROGRESS NOTES
"[facility], [place], operated by [facility]    Carli Carver  1935   [id number]      Patient Care Team:  Zamzam Amador PA-C as PCP - General (Physician Assistant)  Eric Almodovar DO as Consulting Physician (Cardiology)  SALBADOR Nicole Jr., MD as Consulting Physician (Ophthalmology)  Lida Lin APRN as Nurse Practitioner (Family Medicine)  Michael Jamison MD (Optometry)  Owen Wood III, MD as Cardiologist (Cardiology)    Chief Complaint::   Chief Complaint   Patient presents with    Type 2 Diabetes        HPI  Carli Carver is an 89-year-old female date of birth 1935 who presents today for follow-up.  Past medical history significant for atrial fibrillation, type 2 diabetes, hyperlipidemia, hypothyroidism.  She presents today with her daughter.  Carli lost her  of 38 years in January.  She has support with her daughters who live in town.  She reports walking, getting out of the house some.  Denies difficulty sleeping.  Has had some dizzy spells lately.  Reports \" room spinning\" at times.  She denies any falls in the home.  She follows with cardiology.  She denies chest pain, shortness of breath, palpitations, or headaches.      Patient Active Problem List   Diagnosis    Essential hypertension    Cardiac pacemaker in situ    Complete heart block    Permanent atrial fibrillation    Chronic anticoagulation    Mixed hyperlipidemia    Acquired hypothyroidism    Type II diabetes mellitus    Vertigo        Past Medical History:   Diagnosis Date    A-fib     Atrial flutter     Bradycardia     Cataract     Constipation     Heartburn     Hyperlipidemia     Hypothyroid     Wears glasses        Past Surgical History:   Procedure Laterality Date    ABLATION OF DYSRHYTHMIC FOCUS      CARDIAC ELECTROPHYSIOLOGY PROCEDURE N/A 7/15/2020    Procedure: AV node ablation- pt has SJM pacemaker;  Surgeon: Eric Almodovar DO;  Location: [facility] INVASIVE LOCATION;  Service: Cardiovascular;  " Laterality: N/A;    CARDIOVERSION      CATARACT EXTRACTION Right     CATARACT EXTRACTION      COLONOSCOPY      INSERT / REPLACE / REMOVE PACEMAKER  08/29/2014    [facility] Location: [facility]    TUBAL ABDOMINAL LIGATION         Family History   Problem Relation Age of Onset    Lung cancer Mother     Heart disease Brother        Social History     Socioeconomic History    Marital status:    Tobacco Use    Smoking status: Never     Passive exposure: Never    Smokeless tobacco: Never   Vaping Use    Vaping status: Never Used   Substance and Sexual Activity    Alcohol use: No    Drug use: Never    Sexual activity: Defer       No Known Allergies    Review of Systems   Constitutional:  Negative for activity change, appetite change, diaphoresis, fatigue, unexpected weight gain and unexpected weight loss.   HENT:  Negative for hearing loss.    Eyes:  Negative for blurred vision, double vision and visual disturbance.   Respiratory:  Negative for cough, choking, chest tightness and shortness of breath.    Cardiovascular:  Negative for chest pain, palpitations and leg swelling.   Gastrointestinal:  Negative for abdominal pain, blood in stool, GERD and indigestion.   Endocrine: Negative for cold intolerance and heat intolerance.   Genitourinary:  Negative for dysuria and hematuria.   Musculoskeletal:  Negative for arthralgias and myalgias.   Skin:  Negative for skin lesions.   Neurological:  Positive for dizziness and light-headedness. Negative for tremors, seizures, syncope, speech difficulty, weakness, headache, memory problem and confusion.   Hematological:  Does not bruise/bleed easily.   Psychiatric/Behavioral:  Negative for sleep disturbance and depressed mood. The patient is not nervous/anxious.         Vital Signs  Vitals:    03/08/24 0957   BP: 112/66   BP Location: Left arm   Patient Position: Sitting   Cuff Size: Adult   Pulse: 64   Temp: 96.6 °F (35.9 °C)   TempSrc: Infrared   Weight: 58.8 kg (129  "lb 9.6 oz)   Height: 157.5 cm (62.01\")   PainSc: 0-No pain     Body mass index is 23.7 kg/m².  BMI is within normal parameters. No other follow-up for BMI required.     Advance Care Planning   ACP discussion was held with the patient during this visit. Patient has an advance directive in EMR which is still valid.        Current Outpatient Medications:     ascorbic acid (VITAMIN C) 1000 MG tablet, Take 1 tablet by mouth Daily., Disp: , Rfl:     atorvastatin (LIPITOR) 20 MG tablet, TAKE ONE TABLET BY MOUTH ONCE NIGHTLY, Disp: 90 tablet, Rfl: 1    B Complex Vitamins (Vitamin B Complex) tablet, Take 1 tablet by mouth Daily., Disp: , Rfl:     BIOTIN PO, Take 5,000 mcg by mouth Daily., Disp: , Rfl:     Calcium-Magnesium-Vitamin D (CALCIUM 1200+D3 PO), Take 1 tablet by mouth Daily. 1000 units d3, Disp: , Rfl:     Cholecalciferol (Vitamin D3) 50 MCG (2000 UT) capsule, Take 1 capsule by mouth Daily., Disp: , Rfl:     docusate sodium (COLACE) 100 MG capsule, Take 1 capsule by mouth As Needed., Disp: , Rfl:     Eliquis 5 MG tablet tablet, TAKE 1 TABLET BY MOUTH TWICE A DAY, Disp: 60 tablet, Rfl: 6    levothyroxine (SYNTHROID, LEVOTHROID) 88 MCG tablet, TAKE 1 TABLET BY MOUTH DAILY, Disp: 90 tablet, Rfl: 1    Magnesium 500 MG tablet, Take 1 tablet by mouth Daily., Disp: , Rfl:     melatonin 5 MG tablet tablet, Take 1 tablet by mouth At Night As Needed., Disp: , Rfl:     Multiple Vitamins-Minerals (MULTIVITAMIN ADULT PO), Take 1 tablet by mouth Daily., Disp: , Rfl:     nitroglycerin (NITROSTAT) 0.4 MG SL tablet, 1 under the tongue as needed for angina, may repeat q5mins for up three doses, Disp: 100 tablet, Rfl: 11    Omega-3 Fatty Acids (fish oil) 1200 MG capsule capsule, Take 1 capsule by mouth Daily., Disp: , Rfl:     meclizine (ANTIVERT) 25 MG tablet, Take 1 tablet by mouth every night at bedtime., Disp: 30 tablet, Rfl: 1    Physical Exam  Vitals reviewed.   Constitutional:       Appearance: Normal appearance. She is " well-developed.   HENT:      Head: Normocephalic and atraumatic.      Right Ear: Hearing, tympanic membrane, ear canal and external ear normal.      Left Ear: Hearing, tympanic membrane, ear canal and external ear normal.      Nose: Nose normal.      Mouth/Throat:      Mouth: Mucous membranes are moist.      Pharynx: Oropharynx is clear. Uvula midline.   Eyes:      General: Lids are normal.      Conjunctiva/sclera: Conjunctivae normal.      Pupils: Pupils are equal, round, and reactive to light.   Cardiovascular:      Rate and Rhythm: Normal rate and regular rhythm.      Heart sounds: Normal heart sounds.   Pulmonary:      Effort: Pulmonary effort is normal.      Breath sounds: Normal breath sounds.   Abdominal:      General: Bowel sounds are normal.      Palpations: Abdomen is soft.   Musculoskeletal:         General: Normal range of motion.      Cervical back: Full passive range of motion without pain, normal range of motion and neck supple.   Skin:     General: Skin is warm and dry.   Neurological:      General: No focal deficit present.      Mental Status: She is alert and oriented to person, place, and time.      Deep Tendon Reflexes: Reflexes are normal and symmetric.   Psychiatric:         Speech: Speech normal.         Behavior: Behavior normal.         Thought Content: Thought content normal.         Judgment: Judgment normal.          ACE III MINI            Results Review:    Recent Results (from the past 672 hour(s))   POC Glucose    Collection Time: 03/08/24 10:42 AM    Specimen: Blood   Result Value Ref Range    Glucose 130 70 - 130 mg/dL   Comprehensive Metabolic Panel    Collection Time: 03/08/24 10:52 AM    Specimen: Blood   Result Value Ref Range    Glucose 117 (H) 65 - 99 mg/dL    BUN 15 8 - 23 mg/dL    Creatinine 0.84 0.57 - 1.00 mg/dL    Sodium 142 136 - 145 mmol/L    Potassium 4.6 3.5 - 5.2 mmol/L    Chloride 104 98 - 107 mmol/L    CO2 24.3 22.0 - 29.0 mmol/L    Calcium 10.6 (H) 8.6 - 10.5  mg/dL    Total Protein 7.5 6.0 - 8.5 g/dL    Albumin 4.6 3.5 - 5.2 g/dL    ALT (SGPT) 20 1 - 33 U/L    AST (SGOT) 24 1 - 32 U/L    Alkaline Phosphatase 116 39 - 117 U/L    Total Bilirubin 2.1 (H) 0.0 - 1.2 mg/dL    Globulin 2.9 gm/dL    A/G Ratio 1.6 g/dL    BUN/Creatinine Ratio 17.9 7.0 - 25.0    Anion Gap 13.7 5.0 - 15.0 mmol/L    eGFR 67.4 >60.0 mL/min/1.73   Hemoglobin A1c    Collection Time: 03/08/24 10:52 AM    Specimen: Blood   Result Value Ref Range    Hemoglobin A1C 6.40 (H) 4.80 - 5.60 %   Lipid Panel    Collection Time: 03/08/24 10:52 AM    Specimen: Blood   Result Value Ref Range    Total Cholesterol 160 0 - 200 mg/dL    Triglycerides 97 0 - 150 mg/dL    HDL Cholesterol 58 40 - 60 mg/dL    LDL Cholesterol  84 0 - 100 mg/dL    VLDL Cholesterol 18 5 - 40 mg/dL    LDL/HDL Ratio 1.42    MicroAlbumin, Urine, Random - Urine, Clean Catch    Collection Time: 03/08/24 10:52 AM    Specimen: Urine, Clean Catch   Result Value Ref Range    Microalbumin, Urine 1.4 mg/dL   TSH    Collection Time: 03/08/24 10:52 AM    Specimen: Blood   Result Value Ref Range    TSH 0.530 0.270 - 4.200 uIU/mL   T4, Free    Collection Time: 03/08/24 10:52 AM    Specimen: Blood   Result Value Ref Range    Free T4 1.79 (H) 0.93 - 1.70 ng/dL   T3, Free    Collection Time: 03/08/24 10:52 AM    Specimen: Blood   Result Value Ref Range    T3, Free 3.25 2.00 - 4.40 pg/mL   Vitamin B12    Collection Time: 03/08/24 10:52 AM    Specimen: Blood   Result Value Ref Range    Vitamin B-12 735 211 - 946 pg/mL   CBC Auto Differential    Collection Time: 03/08/24 10:52 AM    Specimen: Blood   Result Value Ref Range    WBC 6.07 3.40 - 10.80 10*3/mm3    RBC 4.78 3.77 - 5.28 10*6/mm3    Hemoglobin 15.5 12.0 - 15.9 g/dL    Hematocrit 45.2 34.0 - 46.6 %    MCV 94.6 79.0 - 97.0 fL    MCH 32.4 26.6 - 33.0 pg    MCHC 34.3 31.5 - 35.7 g/dL    RDW 12.4 12.3 - 15.4 %    RDW-SD 43.2 37.0 - 54.0 fl    MPV 10.4 6.0 - 12.0 fL    Platelets 196 140 - 450 10*3/mm3     Neutrophil % 59.6 42.7 - 76.0 %    Lymphocyte % 29.5 19.6 - 45.3 %    Monocyte % 9.4 5.0 - 12.0 %    Eosinophil % 1.0 0.3 - 6.2 %    Basophil % 0.2 0.0 - 1.5 %    Immature Grans % 0.3 0.0 - 0.5 %    Neutrophils, Absolute 3.62 1.70 - 7.00 10*3/mm3    Lymphocytes, Absolute 1.79 0.70 - 3.10 10*3/mm3    Monocytes, Absolute 0.57 0.10 - 0.90 10*3/mm3    Eosinophils, Absolute 0.06 0.00 - 0.40 10*3/mm3    Basophils, Absolute 0.01 0.00 - 0.20 10*3/mm3    Immature Grans, Absolute 0.02 0.00 - 0.05 10*3/mm3    nRBC 0.0 0.0 - 0.2 /100 WBC     Procedures    Medication Review: Medications reviewed and noted    Social History     Socioeconomic History    Marital status:    Tobacco Use    Smoking status: Never     Passive exposure: Never    Smokeless tobacco: Never   Vaping Use    Vaping status: Never Used   Substance and Sexual Activity    Alcohol use: No    Drug use: Never    Sexual activity: Defer        Assessment/Plan:    Diagnoses and all orders for this visit:    1. Permanent atrial fibrillation (Primary)  Overview:  AV node catheter ablation, 7/15/2020    Orders:  -     CBC & Differential; Future  -     Comprehensive Metabolic Panel; Future    2. Mixed hyperlipidemia  Overview:  Recheck today.  Patient currently taking atorvastatin 20 mg tablets daily.    Orders:  -     Lipid Panel; Future    3. Acquired hypothyroidism  -     TSH; Future  -     T4, Free; Future  -     T3, Free; Future    4. Type 2 diabetes mellitus without complication, without long-term current use of insulin  Overview:  A1C 6.4%. Discussed addition of metformin.     Orders:  -     Comprehensive Metabolic Panel; Future  -     Hemoglobin A1c; Future  -     MicroAlbumin, Urine, Random - Urine, Clean Catch; Future  -     Vitamin B12; Future  -     POC Glucose    5. Essential hypertension  Overview:  Slightly elevated in the office today.  This was noted after dizzy episode.  I have asked her to monitor blood pressures at home.      6.  Vertigo  Overview:  Trial of meclizine in the evenings.  Episode of vertigo in the office.  /70.  Glucose 130. Labs today.  She will check BP at home.  Discussed treatment for diabetes.      Other orders  -     meclizine (ANTIVERT) 25 MG tablet; Take 1 tablet by mouth every night at bedtime.  Dispense: 30 tablet; Refill: 1         Plan of care reviewed with patient at the conclusion of today's visit. Education was provided regarding diagnosis, management, and any prescribed or recommended OTC medications.Patient verbalizes understanding of and agreement with management plan.       I have seen Carli on this date of service. This time includes time spent by me in the following activities:preparing for the visit, reviewing tests, obtaining and/or reviewing a separately obtained history, performing a medically appropriate examination and/or evaluation , counseling and educating the patient/family/caregiver, ordering medications, tests, or procedures, referring and communicating with other health care professionals , and documenting information in the medical record    Zamzam Amador PA-C      Note: Part of this note may be an electronic transcription/translation of spoken language to printed text using the Dragon Dictation system.

## 2024-03-09 PROBLEM — R42 DIZZINESS: Status: ACTIVE | Noted: 2024-03-09

## 2024-04-15 ENCOUNTER — OFFICE VISIT (OUTPATIENT)
Dept: INTERNAL MEDICINE | Facility: CLINIC | Age: 89
End: 2024-04-15
Payer: MEDICARE

## 2024-04-15 VITALS
TEMPERATURE: 97.2 F | HEIGHT: 62 IN | SYSTOLIC BLOOD PRESSURE: 124 MMHG | HEART RATE: 64 BPM | BODY MASS INDEX: 24.62 KG/M2 | DIASTOLIC BLOOD PRESSURE: 62 MMHG | WEIGHT: 133.8 LBS

## 2024-04-15 DIAGNOSIS — E11.9 TYPE 2 DIABETES MELLITUS WITHOUT COMPLICATION, UNSPECIFIED WHETHER LONG TERM INSULIN USE: ICD-10-CM

## 2024-04-15 DIAGNOSIS — I10 ESSENTIAL HYPERTENSION: ICD-10-CM

## 2024-04-15 DIAGNOSIS — E03.9 ACQUIRED HYPOTHYROIDISM: ICD-10-CM

## 2024-04-15 DIAGNOSIS — I48.21 PERMANENT ATRIAL FIBRILLATION: Primary | ICD-10-CM

## 2024-04-15 DIAGNOSIS — E78.2 MIXED HYPERLIPIDEMIA: ICD-10-CM

## 2024-04-15 PROCEDURE — 99214 OFFICE O/P EST MOD 30 MIN: CPT | Performed by: PHYSICIAN ASSISTANT

## 2024-04-15 NOTE — PROGRESS NOTES
[facility] Internal Medicine    Carli Carver  1935   [id number]      Patient Care Team:  Zamzam Amador PA-C as PCP - General (Physician Assistant)  Eric Almodovar DO as Consulting Physician (Cardiology)  SALBADOR Nicole Jr., MD as Consulting Physician (Ophthalmology)  Lida Lin APRN as Nurse Practitioner (Family Medicine)  Michael Jamison MD (Optometry)  Owen Wood III, MD as Cardiologist (Cardiology)    Chief Complaint::   Chief Complaint   Patient presents with    Atrial Fibrillation     5 week follow-up    Abdominal Pain     Right side of abdomen (ache) comes and goes        HPI  Carli Carver is an 89-year-old female date of birth 1935 who presents today for follow-up.  Past medical history significant for atrial fibrillation, hypertension, hyperlipidemia, type 2 diabetes, and hypothyroidism.  Last labs were performed in March which showed improving A1c of 6.4%.  Walks outside every day, weather permitting, or at the mall.  She walks 5 days a week for 20-30 minutes.   No more dizzy episodes.  She reports this has completely resolved.  Some intermittent right lower quadrant pain, unsure if it is related to meals.  Otherwise, she denies constipation because she takes Metamucil daily.  Denies chest pain, shortness of breath, palpitations, or headaches.      Patient Active Problem List   Diagnosis    Essential hypertension    Cardiac pacemaker in situ    Complete heart block    Permanent atrial fibrillation    Chronic anticoagulation    Mixed hyperlipidemia    Acquired hypothyroidism    Type II diabetes mellitus    Vertigo        Past Medical History:   Diagnosis Date    A-fib     Atrial flutter     Bradycardia     Cataract     Constipation     Heartburn     Hyperlipidemia     Hypothyroid     Wears glasses        Past Surgical History:   Procedure Laterality Date    ABLATION OF DYSRHYTHMIC FOCUS      CARDIAC ELECTROPHYSIOLOGY PROCEDURE N/A 7/15/2020    Procedure: AV  node ablation- pt has SJM pacemaker;  Surgeon: Eric Almodovar DO;  Location: [facility] INVASIVE LOCATION;  Service: Cardiovascular;  Laterality: N/A;    CARDIOVERSION      CATARACT EXTRACTION Right     CATARACT EXTRACTION      COLONOSCOPY      INSERT / REPLACE / REMOVE PACEMAKER  08/29/2014    [facility], Location: [facility]    TUBAL ABDOMINAL LIGATION         Family History   Problem Relation Age of Onset    Lung cancer Mother     Heart disease Brother        Social History     Socioeconomic History    Marital status:    Tobacco Use    Smoking status: Never     Passive exposure: Never    Smokeless tobacco: Never   Vaping Use    Vaping status: Never Used   Substance and Sexual Activity    Alcohol use: No    Drug use: Never    Sexual activity: Defer       No Known Allergies    Review of Systems   Constitutional:  Negative for activity change, appetite change, diaphoresis, fatigue, unexpected weight gain and unexpected weight loss.   HENT:  Negative for hearing loss.    Eyes:  Negative for visual disturbance.   Respiratory:  Negative for chest tightness and shortness of breath.    Cardiovascular:  Negative for chest pain, palpitations and leg swelling.   Gastrointestinal:  Positive for abdominal pain. Negative for blood in stool, GERD and indigestion.   Endocrine: Negative for cold intolerance and heat intolerance.   Genitourinary:  Negative for dysuria and hematuria.   Musculoskeletal:  Negative for arthralgias and myalgias.   Skin:  Negative for skin lesions.   Neurological:  Negative for dizziness, tremors, seizures, syncope, speech difficulty, weakness, headache, memory problem and confusion.   Hematological:  Does not bruise/bleed easily.   Psychiatric/Behavioral:  Negative for sleep disturbance and depressed mood. The patient is not nervous/anxious.         Vital Signs  Vitals:    04/15/24 1200   BP: 124/62   BP Location: Left arm   Patient Position: Sitting   Cuff Size: Adult   Pulse: 64  "  Temp: 97.2 °F (36.2 °C)   TempSrc: Infrared   Weight: 60.7 kg (133 lb 12.8 oz)   Height: 157.5 cm (62.01\")   PainSc:   5   PainLoc: Abdomen     Body mass index is 24.47 kg/m².  BMI is within normal parameters. No other follow-up for BMI required.     Advance Care Planning   ACP discussion was held with the patient during this visit. Patient does not have an advance directive, information provided.       Current Outpatient Medications:     ascorbic acid (VITAMIN C) 1000 MG tablet, Take 1 tablet by mouth Daily., Disp: , Rfl:     atorvastatin (LIPITOR) 20 MG tablet, TAKE ONE TABLET BY MOUTH ONCE NIGHTLY, Disp: 90 tablet, Rfl: 1    B Complex Vitamins (Vitamin B Complex) tablet, Take 1 tablet by mouth Daily., Disp: , Rfl:     BIOTIN PO, Take 5,000 mcg by mouth Daily., Disp: , Rfl:     Calcium-Magnesium-Vitamin D (CALCIUM 1200+D3 PO), Take 1 tablet by mouth Daily. 1000 units d3, Disp: , Rfl:     Cholecalciferol (Vitamin D3) 50 MCG (2000 UT) capsule, Take 1 capsule by mouth Daily., Disp: , Rfl:     docusate sodium (COLACE) 100 MG capsule, Take 1 capsule by mouth As Needed., Disp: , Rfl:     Eliquis 5 MG tablet tablet, TAKE 1 TABLET BY MOUTH TWICE A DAY, Disp: 60 tablet, Rfl: 6    levothyroxine (SYNTHROID, LEVOTHROID) 88 MCG tablet, TAKE 1 TABLET BY MOUTH DAILY, Disp: 90 tablet, Rfl: 1    Magnesium 500 MG tablet, Take 1 tablet by mouth Daily., Disp: , Rfl:     melatonin 5 MG tablet tablet, Take 1 tablet by mouth At Night As Needed., Disp: , Rfl:     Multiple Vitamins-Minerals (MULTIVITAMIN ADULT PO), Take 1 tablet by mouth Daily., Disp: , Rfl:     nitroglycerin (NITROSTAT) 0.4 MG SL tablet, 1 under the tongue as needed for angina, may repeat q5mins for up three doses, Disp: 100 tablet, Rfl: 11    Omega-3 Fatty Acids (fish oil) 1200 MG capsule capsule, Take 1 capsule by mouth Daily., Disp: , Rfl:     Physical Exam  Vitals reviewed.   Constitutional:       Appearance: Normal appearance. She is well-developed.   HENT:      " Head: Normocephalic and atraumatic.      Right Ear: Hearing, tympanic membrane, ear canal and external ear normal.      Left Ear: Hearing, tympanic membrane, ear canal and external ear normal.      Nose: Nose normal.      Mouth/Throat:      Pharynx: Uvula midline.   Eyes:      General: Lids are normal.      Conjunctiva/sclera: Conjunctivae normal.      Pupils: Pupils are equal, round, and reactive to light.   Cardiovascular:      Rate and Rhythm: Normal rate and regular rhythm.      Heart sounds: Normal heart sounds.   Pulmonary:      Effort: Pulmonary effort is normal.      Breath sounds: Normal breath sounds.   Abdominal:      General: Bowel sounds are normal.      Palpations: Abdomen is soft.   Musculoskeletal:         General: Normal range of motion.      Cervical back: Full passive range of motion without pain, normal range of motion and neck supple.   Skin:     General: Skin is warm and dry.   Neurological:      General: No focal deficit present.      Mental Status: She is alert and oriented to person, place, and time. Mental status is at baseline.      Deep Tendon Reflexes: Reflexes are normal and symmetric.   Psychiatric:         Speech: Speech normal.         Behavior: Behavior normal.         Thought Content: Thought content normal.         Judgment: Judgment normal.          ACE III MINI            Results Review:    No results found for this or any previous visit (from the past 672 hour(s)).  Procedures    Medication Review: Medications reviewed and noted    Social History     Socioeconomic History    Marital status:    Tobacco Use    Smoking status: Never     Passive exposure: Never    Smokeless tobacco: Never   Vaping Use    Vaping status: Never Used   Substance and Sexual Activity    Alcohol use: No    Drug use: Never    Sexual activity: Defer        Assessment/Plan:    Diagnoses and all orders for this visit:    1. Permanent atrial fibrillation (Primary)  Overview:  AV node catheter ablation,  7/15/2020  Continue Eliquis 5 mg twice daily.      2. Essential hypertension  Overview:  Much improved.  She will continue to monitor at home.    Orders:  -     CBC & Differential; Future  -     Comprehensive Metabolic Panel; Future    3. Type 2 diabetes mellitus without complication, unspecified whether long term insulin use  Overview:  A1C 6.4% March.  Reviewed diet and exercise.  Discussed importance of daily walking.  Continue to cut back on processed foods and sugars in the diet.    Orders:  -     Comprehensive Metabolic Panel; Future  -     Hemoglobin A1c; Future  -     MicroAlbumin, Urine, Random - Urine, Clean Catch; Future  -     Vitamin B12; Future    4. Acquired hypothyroidism  Overview:  Overall, panel was within range.  Will recheck in 3 months.  No change in medication at this point.    Orders:  -     TSH; Future  -     T4, Free; Future  -     T3, Free; Future    5. Mixed hyperlipidemia  Overview:  Recheck today.  Patient currently taking atorvastatin 20 mg tablets daily.    Orders:  -     Lipid Panel; Future         Plan of care reviewed with patient at the conclusion of today's visit. Education was provided regarding diagnosis, management, and any prescribed or recommended OTC medications.Patient verbalizes understanding of and agreement with management plan.         I have seen Carli on this date of service:preparing for the visit, reviewing tests, obtaining and/or reviewing a separately obtained history, performing a medically appropriate examination and/or evaluation , counseling and educating the patient/family/caregiver, ordering medications, tests, or procedures, referring and communicating with other health care professionals , and documenting information in the medical record    Zamzam Amador PA-C      Note: Part of this note may be an electronic transcription/translation of spoken language to printed text using the Dragon Dictation system.

## 2024-07-09 RX ORDER — ATORVASTATIN CALCIUM 20 MG/1
TABLET, FILM COATED ORAL
Qty: 90 TABLET | Refills: 1 | Status: SHIPPED | OUTPATIENT
Start: 2024-07-09

## 2024-07-11 RX ORDER — LEVOTHYROXINE SODIUM 88 UG/1
88 TABLET ORAL DAILY
Qty: 90 TABLET | Refills: 1 | Status: SHIPPED | OUTPATIENT
Start: 2024-07-11

## 2024-07-29 ENCOUNTER — LAB (OUTPATIENT)
Dept: LAB | Facility: HOSPITAL | Age: 89
End: 2024-07-29
Payer: MEDICARE

## 2024-07-29 DIAGNOSIS — E03.9 ACQUIRED HYPOTHYROIDISM: ICD-10-CM

## 2024-07-29 DIAGNOSIS — E11.9 TYPE 2 DIABETES MELLITUS WITHOUT COMPLICATION, UNSPECIFIED WHETHER LONG TERM INSULIN USE: ICD-10-CM

## 2024-07-29 DIAGNOSIS — I10 ESSENTIAL HYPERTENSION: ICD-10-CM

## 2024-07-29 DIAGNOSIS — E78.2 MIXED HYPERLIPIDEMIA: ICD-10-CM

## 2024-07-29 LAB
ALBUMIN SERPL-MCNC: 4.5 G/DL (ref 3.5–5.2)
ALBUMIN UR-MCNC: 1.5 MG/DL
ALBUMIN/GLOB SERPL: 1.5 G/DL
ALP SERPL-CCNC: 104 U/L (ref 39–117)
ALT SERPL W P-5'-P-CCNC: 17 U/L (ref 1–33)
ANION GAP SERPL CALCULATED.3IONS-SCNC: 11 MMOL/L (ref 5–15)
AST SERPL-CCNC: 23 U/L (ref 1–32)
BASOPHILS # BLD AUTO: 0.02 10*3/MM3 (ref 0–0.2)
BASOPHILS NFR BLD AUTO: 0.4 % (ref 0–1.5)
BILIRUB SERPL-MCNC: 1.7 MG/DL (ref 0–1.2)
BUN SERPL-MCNC: 19 MG/DL (ref 8–23)
BUN/CREAT SERPL: 23.5 (ref 7–25)
CALCIUM SPEC-SCNC: 9.9 MG/DL (ref 8.6–10.5)
CHLORIDE SERPL-SCNC: 104 MMOL/L (ref 98–107)
CHOLEST SERPL-MCNC: 161 MG/DL (ref 0–200)
CO2 SERPL-SCNC: 25 MMOL/L (ref 22–29)
CREAT SERPL-MCNC: 0.81 MG/DL (ref 0.57–1)
DEPRECATED RDW RBC AUTO: 41.8 FL (ref 37–54)
EGFRCR SERPLBLD CKD-EPI 2021: 70.4 ML/MIN/1.73
EOSINOPHIL # BLD AUTO: 0.08 10*3/MM3 (ref 0–0.4)
EOSINOPHIL NFR BLD AUTO: 1.6 % (ref 0.3–6.2)
ERYTHROCYTE [DISTWIDTH] IN BLOOD BY AUTOMATED COUNT: 12.1 % (ref 12.3–15.4)
GLOBULIN UR ELPH-MCNC: 3 GM/DL
GLUCOSE SERPL-MCNC: 132 MG/DL (ref 65–99)
HBA1C MFR BLD: 6.9 % (ref 4.8–5.6)
HCT VFR BLD AUTO: 44.8 % (ref 34–46.6)
HDLC SERPL-MCNC: 58 MG/DL (ref 40–60)
HGB BLD-MCNC: 15.2 G/DL (ref 12–15.9)
IMM GRANULOCYTES # BLD AUTO: 0.01 10*3/MM3 (ref 0–0.05)
IMM GRANULOCYTES NFR BLD AUTO: 0.2 % (ref 0–0.5)
LDLC SERPL CALC-MCNC: 83 MG/DL (ref 0–100)
LDLC/HDLC SERPL: 1.38 {RATIO}
LYMPHOCYTES # BLD AUTO: 1.93 10*3/MM3 (ref 0.7–3.1)
LYMPHOCYTES NFR BLD AUTO: 37.5 % (ref 19.6–45.3)
MCH RBC QN AUTO: 32.2 PG (ref 26.6–33)
MCHC RBC AUTO-ENTMCNC: 33.9 G/DL (ref 31.5–35.7)
MCV RBC AUTO: 94.9 FL (ref 79–97)
MONOCYTES # BLD AUTO: 0.49 10*3/MM3 (ref 0.1–0.9)
MONOCYTES NFR BLD AUTO: 9.5 % (ref 5–12)
NEUTROPHILS NFR BLD AUTO: 2.62 10*3/MM3 (ref 1.7–7)
NEUTROPHILS NFR BLD AUTO: 50.8 % (ref 42.7–76)
NRBC BLD AUTO-RTO: 0 /100 WBC (ref 0–0.2)
PLATELET # BLD AUTO: 208 10*3/MM3 (ref 140–450)
PMV BLD AUTO: 9.9 FL (ref 6–12)
POTASSIUM SERPL-SCNC: 4 MMOL/L (ref 3.5–5.2)
PROT SERPL-MCNC: 7.5 G/DL (ref 6–8.5)
RBC # BLD AUTO: 4.72 10*6/MM3 (ref 3.77–5.28)
SODIUM SERPL-SCNC: 140 MMOL/L (ref 136–145)
T3FREE SERPL-MCNC: 2.62 PG/ML (ref 2–4.4)
T4 FREE SERPL-MCNC: 1.19 NG/DL (ref 0.92–1.68)
TRIGL SERPL-MCNC: 115 MG/DL (ref 0–150)
TSH SERPL DL<=0.05 MIU/L-ACNC: 0.98 UIU/ML (ref 0.27–4.2)
VIT B12 BLD-MCNC: 633 PG/ML (ref 211–946)
VLDLC SERPL-MCNC: 20 MG/DL (ref 5–40)
WBC NRBC COR # BLD AUTO: 5.15 10*3/MM3 (ref 3.4–10.8)

## 2024-07-29 PROCEDURE — 82043 UR ALBUMIN QUANTITATIVE: CPT

## 2024-07-29 PROCEDURE — 83036 HEMOGLOBIN GLYCOSYLATED A1C: CPT

## 2024-07-29 PROCEDURE — 80061 LIPID PANEL: CPT

## 2024-07-29 PROCEDURE — 84481 FREE ASSAY (FT-3): CPT

## 2024-07-29 PROCEDURE — 85025 COMPLETE CBC W/AUTO DIFF WBC: CPT

## 2024-07-29 PROCEDURE — 84443 ASSAY THYROID STIM HORMONE: CPT

## 2024-07-29 PROCEDURE — 82607 VITAMIN B-12: CPT

## 2024-07-29 PROCEDURE — 84439 ASSAY OF FREE THYROXINE: CPT

## 2024-07-29 PROCEDURE — 80053 COMPREHEN METABOLIC PANEL: CPT

## 2024-07-30 ENCOUNTER — OFFICE VISIT (OUTPATIENT)
Dept: INTERNAL MEDICINE | Facility: CLINIC | Age: 89
End: 2024-07-30
Payer: MEDICARE

## 2024-07-30 VITALS
WEIGHT: 136.8 LBS | BODY MASS INDEX: 25.17 KG/M2 | HEIGHT: 62 IN | DIASTOLIC BLOOD PRESSURE: 76 MMHG | OXYGEN SATURATION: 98 % | TEMPERATURE: 97.7 F | SYSTOLIC BLOOD PRESSURE: 130 MMHG | HEART RATE: 64 BPM

## 2024-07-30 DIAGNOSIS — Z95.0 CARDIAC PACEMAKER IN SITU: ICD-10-CM

## 2024-07-30 DIAGNOSIS — E11.9 TYPE 2 DIABETES MELLITUS WITHOUT COMPLICATION, UNSPECIFIED WHETHER LONG TERM INSULIN USE: Primary | ICD-10-CM

## 2024-07-30 DIAGNOSIS — I10 ESSENTIAL HYPERTENSION: ICD-10-CM

## 2024-07-30 DIAGNOSIS — E78.2 MIXED HYPERLIPIDEMIA: ICD-10-CM

## 2024-07-30 DIAGNOSIS — I48.21 PERMANENT ATRIAL FIBRILLATION: ICD-10-CM

## 2024-07-30 DIAGNOSIS — E03.9 ACQUIRED HYPOTHYROIDISM: ICD-10-CM

## 2024-07-30 PROCEDURE — 1126F AMNT PAIN NOTED NONE PRSNT: CPT | Performed by: PHYSICIAN ASSISTANT

## 2024-07-30 PROCEDURE — 99214 OFFICE O/P EST MOD 30 MIN: CPT | Performed by: PHYSICIAN ASSISTANT

## 2024-07-30 RX ORDER — METFORMIN HYDROCHLORIDE 500 MG/1
500 TABLET, EXTENDED RELEASE ORAL
Qty: 90 TABLET | Refills: 1 | Status: SHIPPED | OUTPATIENT
Start: 2024-07-30

## 2024-07-30 NOTE — PROGRESS NOTES
[facility] Internal Medicine    Carli Carver  1935   [id number]      Patient Care Team:  Zamzam Amador PA-C as PCP - General (Physician Assistant)  Eric Almodovar DO as Consulting Physician (Cardiology)  SALBADOR Nicole Jr., MD as Consulting Physician (Ophthalmology)  Lida Lin APRN as Nurse Practitioner (Family Medicine)  Michael Jamison MD (Optometry)  Owen Wood III, MD as Cardiologist (Cardiology)    Chief Complaint::   Chief Complaint   Patient presents with    Atrial Fibrillation     3 month follow-up          HPI    Carli Carver is an 89-year-old female date of birth 1935 who presents today for follow-up.  Past medical history significant for atrial fibrillation, hypertension, hyperlipidemia, type 2 diabetes, and hypothyroidism.  Last labs were performed yesterday and showed increased A1c is 6.9%.  Patient does try to walk outside most days of the week, weather permitting.  Or, she walks inside the mall.  She usually tries to walk for 30 minutes to 1 hour.  She denies any balance issues while walking and has not had any falls at home.  No more dizzy episodes.  She reports this has completely resolved.  Denies chest pain, shortness of breath, palpitations, or headaches.      Patient Active Problem List   Diagnosis    Essential hypertension    Cardiac pacemaker in situ    Complete heart block    Permanent atrial fibrillation    Chronic anticoagulation    Mixed hyperlipidemia    Acquired hypothyroidism    Type II diabetes mellitus    Vertigo        Past Medical History:   Diagnosis Date    A-fib     Atrial flutter     Bradycardia     Cataract     Constipation     Heartburn     Hyperlipidemia     Hypothyroid     Wears glasses        Past Surgical History:   Procedure Laterality Date    ABLATION OF DYSRHYTHMIC FOCUS      CARDIAC ELECTROPHYSIOLOGY PROCEDURE N/A 7/15/2020    Procedure: AV node ablation- pt has SJM pacemaker;  Surgeon: Eric Almodovar DO;  Location:  [facility] INVASIVE LOCATION;  Service: Cardiovascular;  Laterality: N/A;    CARDIOVERSION      CATARACT EXTRACTION Right     CATARACT EXTRACTION      COLONOSCOPY      INSERT / REPLACE / REMOVE PACEMAKER  08/29/2014    [facility] Location: [facility]    TUBAL ABDOMINAL LIGATION         Family History   Problem Relation Age of Onset    Lung cancer Mother     Heart disease Brother        Social History     Socioeconomic History    Marital status:    Tobacco Use    Smoking status: Never     Passive exposure: Never    Smokeless tobacco: Never   Vaping Use    Vaping status: Never Used   Substance and Sexual Activity    Alcohol use: No    Drug use: Never    Sexual activity: Defer       No Known Allergies    Review of Systems   Constitutional:  Positive for fatigue. Negative for activity change, appetite change, diaphoresis, unexpected weight gain and unexpected weight loss.   HENT:  Negative for hearing loss.    Eyes:  Negative for visual disturbance.   Respiratory:  Negative for chest tightness and shortness of breath.    Cardiovascular:  Negative for chest pain, palpitations and leg swelling.   Gastrointestinal:  Negative for abdominal pain, blood in stool, GERD and indigestion.   Endocrine: Negative for cold intolerance and heat intolerance.   Genitourinary:  Negative for dysuria and hematuria.   Musculoskeletal:  Negative for arthralgias and myalgias.   Skin:  Negative for skin lesions.   Neurological:  Negative for tremors, seizures, syncope, speech difficulty, weakness, headache, memory problem and confusion.   Hematological:  Does not bruise/bleed easily.   Psychiatric/Behavioral:  Positive for sleep disturbance. Negative for depressed mood. The patient is not nervous/anxious.         Vital Signs  Vitals:    07/30/24 1021   BP: 130/76   BP Location: Right arm   Patient Position: Sitting   Cuff Size: Adult   Pulse: 64   Temp: 97.7 °F (36.5 °C)   TempSrc: Infrared   SpO2: 98%   Weight: 62.1 kg (136 lb  "12.8 oz)   Height: 157.5 cm (62.01\")   PainSc: 0-No pain     Body mass index is 25.01 kg/m².  BMI is >= 25 and <30. (Overweight) The following options were offered after discussion;: weight loss educational material (shared in after visit summary), exercise counseling/recommendations, and nutrition counseling/recommendations     Advance Care Planning   ACP discussion was held with the patient during this visit. Patient has an advance directive in EMR which is still valid.        Current Outpatient Medications:     ascorbic acid (VITAMIN C) 1000 MG tablet, Take 1 tablet by mouth Daily., Disp: , Rfl:     atorvastatin (LIPITOR) 20 MG tablet, TAKE ONE TABLET BY MOUTH ONCE NIGHTLY, Disp: 90 tablet, Rfl: 1    B Complex Vitamins (Vitamin B Complex) tablet, Take 1 tablet by mouth Daily., Disp: , Rfl:     BIOTIN PO, Take 5,000 mcg by mouth Daily., Disp: , Rfl:     Calcium-Magnesium-Vitamin D (CALCIUM 1200+D3 PO), Take 1 tablet by mouth Daily. 1000 units d3, Disp: , Rfl:     Cholecalciferol (Vitamin D3) 50 MCG (2000 UT) capsule, Take 1 capsule by mouth Daily., Disp: , Rfl:     docusate sodium (COLACE) 100 MG capsule, Take 1 capsule by mouth As Needed., Disp: , Rfl:     Eliquis 5 MG tablet tablet, TAKE 1 TABLET BY MOUTH TWICE A DAY, Disp: 60 tablet, Rfl: 6    levothyroxine (SYNTHROID, LEVOTHROID) 88 MCG tablet, TAKE 1 TABLET BY MOUTH DAILY, Disp: 90 tablet, Rfl: 1    Magnesium 500 MG tablet, Take 1 tablet by mouth Daily., Disp: , Rfl:     melatonin 5 MG tablet tablet, Take 1 tablet by mouth At Night As Needed., Disp: , Rfl:     Multiple Vitamins-Minerals (MULTIVITAMIN ADULT PO), Take 1 tablet by mouth Daily., Disp: , Rfl:     nitroglycerin (NITROSTAT) 0.4 MG SL tablet, 1 under the tongue as needed for angina, may repeat q5mins for up three doses, Disp: 100 tablet, Rfl: 11    Omega-3 Fatty Acids (fish oil) 1200 MG capsule capsule, Take 1 capsule by mouth Daily., Disp: , Rfl:     metFORMIN ER (GLUCOPHAGE-XR) 500 MG 24 hr tablet, " Take 1 tablet by mouth Daily With Breakfast., Disp: 90 tablet, Rfl: 1    Physical Exam  Vitals reviewed.   Constitutional:       Appearance: Normal appearance. She is well-developed.   HENT:      Head: Normocephalic and atraumatic.      Right Ear: Hearing, tympanic membrane, ear canal and external ear normal.      Left Ear: Hearing, tympanic membrane, ear canal and external ear normal.      Nose: Nose normal.      Mouth/Throat:      Mouth: Mucous membranes are moist.      Pharynx: Oropharynx is clear. Uvula midline.   Eyes:      General: Lids are normal.      Conjunctiva/sclera: Conjunctivae normal.      Pupils: Pupils are equal, round, and reactive to light.   Cardiovascular:      Rate and Rhythm: Normal rate and regular rhythm.      Heart sounds: Normal heart sounds.   Pulmonary:      Effort: Pulmonary effort is normal.      Breath sounds: Normal breath sounds.   Abdominal:      General: Bowel sounds are normal.      Palpations: Abdomen is soft.   Musculoskeletal:         General: Normal range of motion.      Cervical back: Full passive range of motion without pain, normal range of motion and neck supple.   Skin:     General: Skin is warm and dry.   Neurological:      Mental Status: She is alert and oriented to person, place, and time.      Deep Tendon Reflexes: Reflexes are normal and symmetric.   Psychiatric:         Speech: Speech normal.         Behavior: Behavior normal.         Thought Content: Thought content normal.         Judgment: Judgment normal.          ACE III MINI            Results Review:    Recent Results (from the past 672 hour(s))   Comprehensive Metabolic Panel    Collection Time: 07/29/24  8:01 AM    Specimen: Blood   Result Value Ref Range    Glucose 132 (H) 65 - 99 mg/dL    BUN 19 8 - 23 mg/dL    Creatinine 0.81 0.57 - 1.00 mg/dL    Sodium 140 136 - 145 mmol/L    Potassium 4.0 3.5 - 5.2 mmol/L    Chloride 104 98 - 107 mmol/L    CO2 25.0 22.0 - 29.0 mmol/L    Calcium 9.9 8.6 - 10.5 mg/dL     Total Protein 7.5 6.0 - 8.5 g/dL    Albumin 4.5 3.5 - 5.2 g/dL    ALT (SGPT) 17 1 - 33 U/L    AST (SGOT) 23 1 - 32 U/L    Alkaline Phosphatase 104 39 - 117 U/L    Total Bilirubin 1.7 (H) 0.0 - 1.2 mg/dL    Globulin 3.0 gm/dL    A/G Ratio 1.5 g/dL    BUN/Creatinine Ratio 23.5 7.0 - 25.0    Anion Gap 11.0 5.0 - 15.0 mmol/L    eGFR 70.4 >60.0 mL/min/1.73   Hemoglobin A1c    Collection Time: 07/29/24  8:01 AM    Specimen: Blood   Result Value Ref Range    Hemoglobin A1C 6.90 (H) 4.80 - 5.60 %   Lipid Panel    Collection Time: 07/29/24  8:01 AM    Specimen: Blood   Result Value Ref Range    Total Cholesterol 161 0 - 200 mg/dL    Triglycerides 115 0 - 150 mg/dL    HDL Cholesterol 58 40 - 60 mg/dL    LDL Cholesterol  83 0 - 100 mg/dL    VLDL Cholesterol 20 5 - 40 mg/dL    LDL/HDL Ratio 1.38    MicroAlbumin, Urine, Random - Urine, Clean Catch    Collection Time: 07/29/24  8:01 AM    Specimen: Urine, Clean Catch   Result Value Ref Range    Microalbumin, Urine 1.5 mg/dL   TSH    Collection Time: 07/29/24  8:01 AM    Specimen: Blood   Result Value Ref Range    TSH 0.981 0.270 - 4.200 uIU/mL   T4, Free    Collection Time: 07/29/24  8:01 AM    Specimen: Blood   Result Value Ref Range    Free T4 1.19 0.92 - 1.68 ng/dL   T3, Free    Collection Time: 07/29/24  8:01 AM    Specimen: Blood   Result Value Ref Range    T3, Free 2.62 2.00 - 4.40 pg/mL   Vitamin B12    Collection Time: 07/29/24  8:01 AM    Specimen: Blood   Result Value Ref Range    Vitamin B-12 633 211 - 946 pg/mL   CBC Auto Differential    Collection Time: 07/29/24  8:01 AM    Specimen: Blood   Result Value Ref Range    WBC 5.15 3.40 - 10.80 10*3/mm3    RBC 4.72 3.77 - 5.28 10*6/mm3    Hemoglobin 15.2 12.0 - 15.9 g/dL    Hematocrit 44.8 34.0 - 46.6 %    MCV 94.9 79.0 - 97.0 fL    MCH 32.2 26.6 - 33.0 pg    MCHC 33.9 31.5 - 35.7 g/dL    RDW 12.1 (L) 12.3 - 15.4 %    RDW-SD 41.8 37.0 - 54.0 fl    MPV 9.9 6.0 - 12.0 fL    Platelets 208 140 - 450 10*3/mm3    Neutrophil %  50.8 42.7 - 76.0 %    Lymphocyte % 37.5 19.6 - 45.3 %    Monocyte % 9.5 5.0 - 12.0 %    Eosinophil % 1.6 0.3 - 6.2 %    Basophil % 0.4 0.0 - 1.5 %    Immature Grans % 0.2 0.0 - 0.5 %    Neutrophils, Absolute 2.62 1.70 - 7.00 10*3/mm3    Lymphocytes, Absolute 1.93 0.70 - 3.10 10*3/mm3    Monocytes, Absolute 0.49 0.10 - 0.90 10*3/mm3    Eosinophils, Absolute 0.08 0.00 - 0.40 10*3/mm3    Basophils, Absolute 0.02 0.00 - 0.20 10*3/mm3    Immature Grans, Absolute 0.01 0.00 - 0.05 10*3/mm3    nRBC 0.0 0.0 - 0.2 /100 WBC     Procedures    Medication Review: Medications reviewed and noted    Social History     Socioeconomic History    Marital status:    Tobacco Use    Smoking status: Never     Passive exposure: Never    Smokeless tobacco: Never   Vaping Use    Vaping status: Never Used   Substance and Sexual Activity    Alcohol use: No    Drug use: Never    Sexual activity: Defer        Assessment/Plan:    Diagnoses and all orders for this visit:    1. Type 2 diabetes mellitus without complication, unspecified whether long term insulin use (Primary)  Overview:  A1C 6.9%.  Patient will start metformin  mg daily.  Reviewed diet and exercise.  Discussed importance of daily walking.  Continue to cut back on processed foods and sugars in the diet.    Orders:  -     metFORMIN ER (GLUCOPHAGE-XR) 500 MG 24 hr tablet; Take 1 tablet by mouth Daily With Breakfast.  Dispense: 90 tablet; Refill: 1    2. Essential hypertension  Overview:  Much improved.  She will continue to monitor at home.      3. Cardiac pacemaker in situ  Overview:  Upcoming appointment with Dr. Horowitz.      4. Mixed hyperlipidemia  Overview:  Excellent lipid profile.  Patient currently taking atorvastatin 20 mg tablets daily.      5. Permanent atrial fibrillation  Overview:  AV node catheter ablation, 7/15/2020  Continue Eliquis 5 mg twice daily.      6. Acquired hypothyroidism  Overview:  Overall, panel was within range.  Will recheck in 3 months.  No  change in medication at this point.        Plan of care reviewed with patient at the conclusion of today's visit. Education was provided regarding diagnosis, management, and any prescribed or recommended OTC medications.Patient verbalizes understanding of and agreement with management plan.         I have seen Carli on this date of service:preparing for the visit, reviewing tests, obtaining and/or reviewing a separately obtained history, performing a medically appropriate examination and/or evaluation , counseling and educating the patient/family/caregiver, ordering medications, tests, or procedures, referring and communicating with other health care professionals , and documenting information in the medical record    Zamzam Amador PA-C      Note: Part of this note may be an electronic transcription/translation of spoken language to printed text using the Dragon Dictation system.

## 2024-09-03 ENCOUNTER — TELEPHONE (OUTPATIENT)
Dept: INTERNAL MEDICINE | Facility: CLINIC | Age: 89
End: 2024-09-03
Payer: MEDICARE

## 2024-09-03 NOTE — TELEPHONE ENCOUNTER
Caller: Carli Carver    Relationship: Self    Best call back number: [phone]     What orders are you requesting (i.e. lab or imaging): BLOOD WORK ORDERS    In what timeframe would the patient need to come in: BEFORE 09/12/2024 FOR WELLNESS VISIT    Where will you receive your lab/imaging services: IN OFFICE    Additional notes: PLEASE CONTACT PATIENT WHEN ORDERS ARE ACTIVE.

## 2024-09-13 ENCOUNTER — OFFICE VISIT (OUTPATIENT)
Dept: INTERNAL MEDICINE | Facility: CLINIC | Age: 89
End: 2024-09-13
Payer: MEDICARE

## 2024-09-13 VITALS
DIASTOLIC BLOOD PRESSURE: 72 MMHG | HEART RATE: 60 BPM | WEIGHT: 135.6 LBS | TEMPERATURE: 97.7 F | SYSTOLIC BLOOD PRESSURE: 118 MMHG | HEIGHT: 62 IN | BODY MASS INDEX: 24.95 KG/M2 | OXYGEN SATURATION: 100 %

## 2024-09-13 DIAGNOSIS — Z00.00 MEDICARE ANNUAL WELLNESS VISIT, SUBSEQUENT: Primary | ICD-10-CM

## 2024-09-13 DIAGNOSIS — E03.9 ACQUIRED HYPOTHYROIDISM: ICD-10-CM

## 2024-09-13 DIAGNOSIS — E11.9 TYPE 2 DIABETES MELLITUS WITHOUT COMPLICATION, UNSPECIFIED WHETHER LONG TERM INSULIN USE: ICD-10-CM

## 2024-09-13 DIAGNOSIS — I10 ESSENTIAL HYPERTENSION: ICD-10-CM

## 2024-09-13 DIAGNOSIS — E78.2 MIXED HYPERLIPIDEMIA: ICD-10-CM

## 2024-09-13 NOTE — PROGRESS NOTES
Subjective   The ABCs of the Annual Wellness Visit  Medicare Wellness Visit      Carli Carver is a 89 y.o. patient who presents for a Medicare Wellness Visit.  Past medical history significant for hypertension, cardiac pacemaker, chronic anticoagulation, hyperlipidemia, hypothyroidism, type 2 diabetes.  Carli reports she is living alone.  Her son has recently moved in due to his recent cancer diagnosis.  She reports this is stressful, but has family support with her daughters visiting and assisting often.  She continues to walk most days of the week.  She denies chest pain, shortness of breath, palpitations, or headaches.  The following portions of the patient's history were reviewed and   updated as appropriate: allergies, current medications, past family history, past medical history, past social history, past surgical history, and problem list.    Compared to one year ago, the patient's physical   health is the same.  Compared to one year ago, the patient's mental   health is the same.    Recent Hospitalizations:  She was not admitted to the hospital during the last year.     Current Medical Providers:  Patient Care Team:  Zamzam Amador PA-C as PCP - General (Physician Assistant)  Eric Almodovar DO as Consulting Physician (Cardiology)  SALBADOR Nicole Jr., MD as Consulting Physician (Ophthalmology)  Lida Lin APRN as Nurse Practitioner (Family Medicine)  Michael Jamison MD (Optometry)  Owen Wood III, MD as Cardiologist (Cardiology)    Outpatient Medications Prior to Visit   Medication Sig Dispense Refill    ascorbic acid (VITAMIN C) 1000 MG tablet Take 1 tablet by mouth Daily.      atorvastatin (LIPITOR) 20 MG tablet TAKE ONE TABLET BY MOUTH ONCE NIGHTLY 90 tablet 1    B Complex Vitamins (Vitamin B Complex) tablet Take 1 tablet by mouth Daily.      BIOTIN PO Take 5,000 mcg by mouth Daily.      Calcium-Magnesium-Vitamin D (CALCIUM 1200+D3 PO) Take 1 tablet by mouth  Daily. 1000 units d3      Cholecalciferol (Vitamin D3) 50 MCG (2000 UT) capsule Take 1 capsule by mouth Daily.      docusate sodium (COLACE) 100 MG capsule Take 1 capsule by mouth As Needed.      Eliquis 5 MG tablet tablet TAKE 1 TABLET BY MOUTH TWICE A DAY 60 tablet 6    levothyroxine (SYNTHROID, LEVOTHROID) 88 MCG tablet TAKE 1 TABLET BY MOUTH DAILY 90 tablet 1    Magnesium 500 MG tablet Take 1 tablet by mouth Daily.      melatonin 5 MG tablet tablet Take 1 tablet by mouth At Night As Needed.      metFORMIN ER (GLUCOPHAGE-XR) 500 MG 24 hr tablet Take 1 tablet by mouth Daily With Breakfast. 90 tablet 1    Multiple Vitamins-Minerals (MULTIVITAMIN ADULT PO) Take 1 tablet by mouth Daily.      nitroglycerin (NITROSTAT) 0.4 MG SL tablet 1 under the tongue as needed for angina, may repeat q5mins for up three doses 100 tablet 11    Omega-3 Fatty Acids (fish oil) 1200 MG capsule capsule Take 1 capsule by mouth Daily.       No facility-administered medications prior to visit.     No opioid medication identified on active medication list. I have reviewed chart for other potential  high risk medication/s and harmful drug interactions in the elderly.      Aspirin is not on active medication list.  Aspirin use is contraindicated for this patient due to: current use of Eliquis.  .    Patient Active Problem List   Diagnosis    Essential hypertension    Cardiac pacemaker in situ    Complete heart block    Permanent atrial fibrillation    Chronic anticoagulation    Mixed hyperlipidemia    Acquired hypothyroidism    Type II diabetes mellitus    Vertigo    Medicare annual wellness visit, subsequent     Advance Care Planning Advance Directive is on file.  ACP discussion was held with the patient during this visit. Patient has an advance directive in EMR which is still valid.             Objective   Vitals:    09/13/24 0959   BP: 118/72   BP Location: Left arm   Patient Position: Sitting   Cuff Size: Adult   Pulse: 60   Temp: 97.7 °F  "(36.5 °C)   TempSrc: Infrared   SpO2: 100%   Weight: 61.5 kg (135 lb 9.6 oz)   Height: 157.5 cm (62.01\")   PainSc: 0-No pain       Estimated body mass index is 24.8 kg/m² as calculated from the following:    Height as of this encounter: 157.5 cm (62.01\").    Weight as of this encounter: 61.5 kg (135 lb 9.6 oz).    BMI is within normal parameters. No other follow-up for BMI required.       Does the patient have evidence of cognitive impairment? No  Lab Results   Component Value Date    TRIG 115 2024    HDL 58 2024    LDL 83 2024    VLDL 20 2024    HGBA1C 6.90 (H) 2024       ECG 12 Lead    Date/Time: 2024 11:05 AM  Performed by: Zamzam Amador PA-C    Authorized by: Zamzam Amador PA-C  Comparison: compared with previous ECG   Similar to previous ECG  BPM: 63    Clinical impression: abnormal EKG  Comments: Electronic ventricular pacemaker with ventricular rate of 63 bpm                                                                                                Health  Risk Assessment    Smoking Status:  Social History     Tobacco Use   Smoking Status Never    Passive exposure: Never   Smokeless Tobacco Never     Alcohol Consumption:  Social History     Substance and Sexual Activity   Alcohol Use No       Fall Risk Screen  STEADI Fall Risk Assessment was completed, and patient is at LOW risk for falls.Assessment completed on:2024    Depression Screenin/13/2024    10:06 AM   PHQ-2/PHQ-9 Depression Screening   Little Interest or Pleasure in Doing Things 0-->not at all   Feeling Down, Depressed or Hopeless 0-->not at all   PHQ-9: Brief Depression Severity Measure Score 0     Health Habits and Functional and Cognitive Screenin/13/2024    10:05 AM   Functional & Cognitive Status   Do you have difficulty preparing food and eating? No   Do you have difficulty bathing yourself, getting dressed or grooming yourself? No   Do you have difficulty using " the toilet? No   Do you have difficulty moving around from place to place? No   Do you have trouble with steps or getting out of a bed or a chair? No   Current Diet Well Balanced Diet   Dental Exam Up to date   Eye Exam Up to date   Exercise (times per week) 5 times per week   Current Exercises Include Walking   Do you need help using the phone?  No   Are you deaf or do you have serious difficulty hearing?  No   Do you need help to go to places out of walking distance? No   Do you need help shopping? No   Do you need help preparing meals?  No   Do you need help with housework?  No   Do you need help with laundry? No   Do you need help taking your medications? No   Do you need help managing money? No   Do you ever drive or ride in a car without wearing a seat belt? No   Have you felt unusual stress, anger or loneliness in the last month? No   Who do you live with? Child   If you need help, do you have trouble finding someone available to you? No   Have you been bothered in the last four weeks by sexual problems? No   Do you have difficulty concentrating, remembering or making decisions? No           Age-appropriate Screening Schedule:  Refer to the list below for future screening recommendations based on patient's age, sex and/or medical conditions. Orders for these recommended tests are listed in the plan section. The patient has been provided with a written plan.    Health Maintenance List  Health Maintenance   Topic Date Due    INFLUENZA VACCINE  08/01/2024    COVID-19 Vaccine (1 - 2023-24 season) 09/15/2024 (Originally 9/1/2024)    DXA SCAN  09/15/2024 (Originally 3/30/2020)    ZOSTER VACCINE (1 of 2) 09/15/2024 (Originally 1/12/1987)    RSV Vaccine - Adults (1 - 1-dose 60+ series) 09/13/2025 (Originally 1/12/1997)    Pneumococcal Vaccine 65+ (1 of 2 - PCV) 09/13/2025 (Originally 1/12/1943)    TDAP/TD VACCINES (1 - Tdap) 09/13/2025 (Originally 1/12/1956)    HEMOGLOBIN A1C  01/29/2025    DIABETIC EYE EXAM   "03/29/2025    LIPID PANEL  07/29/2025    URINE MICROALBUMIN  07/29/2025    ANNUAL WELLNESS VISIT  09/13/2025                                                                                                                                                CMS Preventative Services Quick Reference  Risk Factors Identified During Encounter  NONE    The above risks/problems have been discussed with the patient.  Pertinent information has been shared with the patient in the After Visit Summary.  An After Visit Summary and PPPS were made available to the patient.    Follow Up:   Next Medicare Wellness visit to be scheduled in 1 year.         Additional E&M Note during same encounter follows:  Patient has additional, significant, and separately identifiable condition(s)/problem(s) that require work above and beyond the Medicare Wellness Visit     Chief Complaint  Medicare Wellness-subsequent    Subjective   HPI  Carli is also being seen today for an annual adult preventative physical exam.     Review of Systems   Constitutional:  Negative for activity change and appetite change.   HENT:  Negative for congestion, ear pain, nosebleeds and postnasal drip.    Eyes:  Negative for discharge and itching.   Respiratory:  Negative for shortness of breath.    Cardiovascular:  Negative for chest pain, palpitations and leg swelling.   Endocrine: Negative for cold intolerance and heat intolerance.   Musculoskeletal:  Negative for arthralgias and back pain.   Neurological:  Negative for light-headedness and numbness.   Psychiatric/Behavioral:  Negative for agitation and behavioral problems.               Objective   Vital Signs:  /72 (BP Location: Left arm, Patient Position: Sitting, Cuff Size: Adult)   Pulse 60   Temp 97.7 °F (36.5 °C) (Infrared)   Ht 157.5 cm (62.01\")   Wt 61.5 kg (135 lb 9.6 oz)   SpO2 100%   BMI 24.80 kg/m²   Physical Exam  Vitals reviewed.   Constitutional:       Appearance: Normal appearance. She is " well-developed and normal weight.   HENT:      Head: Normocephalic and atraumatic.      Right Ear: Hearing, tympanic membrane, ear canal and external ear normal.      Left Ear: Hearing, tympanic membrane, ear canal and external ear normal.      Nose: Nose normal.      Mouth/Throat:      Mouth: Mucous membranes are moist.      Pharynx: Oropharynx is clear. Uvula midline.   Eyes:      General: Lids are normal.      Conjunctiva/sclera: Conjunctivae normal.      Pupils: Pupils are equal, round, and reactive to light.   Cardiovascular:      Rate and Rhythm: Normal rate and regular rhythm.      Heart sounds: Normal heart sounds.   Pulmonary:      Effort: Pulmonary effort is normal.      Breath sounds: Normal breath sounds.   Abdominal:      General: Bowel sounds are normal.      Palpations: Abdomen is soft.   Musculoskeletal:         General: Normal range of motion.      Cervical back: Full passive range of motion without pain, normal range of motion and neck supple.   Skin:     General: Skin is warm and dry.   Neurological:      General: No focal deficit present.      Mental Status: She is alert and oriented to person, place, and time. Mental status is at baseline.      Deep Tendon Reflexes: Reflexes are normal and symmetric.   Psychiatric:         Mood and Affect: Mood normal.         Speech: Speech normal.         Behavior: Behavior normal.         Thought Content: Thought content normal.         Judgment: Judgment normal.         The following data was reviewed by: Zamzam Amador PA-C on 09/13/2024:        Assessment and Plan Additional age appropriate preventative wellness advice topics were discussed during today's preventative wellness exam(some topics already addressed during AWV portion of the note above):    Physical Activity: Advised cardiovascular activity 150 minutes per week as tolerated. (example brisk walk for 30 minutes, 5 days a week).       Medicare annual wellness visit, subsequent    Essential  hypertension  Hypertension is stable and controlled  Continue current treatment regimen.  Regular aerobic exercise.  Blood pressure will be reassessed in 6 months.  Acquired hypothyroidism    Type 2 diabetes mellitus without complication, unspecified whether long term insulin use  Diabetes is improving with treatment.   Continue current treatment regimen.  Diabetes will be reassessed in 6 months  Mixed hyperlipidemia   Lipid abnormalities are improving with treatment    Plan:  Continue same medication/s without change.      Discussed medication dosage, use, side effects, and goals of treatment in detail.    Counseled patient on lifestyle modifications to help control hyperlipidemia.     Patient Treatment Goals:   LDL goal is less than 70    Followup in 6 months.    Orders Placed This Encounter   Procedures    ECG 12 Lead     This order was created via procedure documentation     Order Specific Question:   Release to patient     Answer:   Routine Release [[id number]]           I have seen Carli on this date of service:preparing for the visit, reviewing tests, obtaining and/or reviewing a separately obtained history, performing a medically appropriate examination and/or evaluation , counseling and educating the patient/family/caregiver, ordering medications, tests, or procedures, referring and communicating with other health care professionals , and documenting information in the medical record  Follow Up   No follow-ups on file.  Patient was given instructions and counseling regarding her condition or for health maintenance advice. Please see specific information pulled into the AVS if appropriate.

## 2024-09-15 NOTE — PATIENT INSTRUCTIONS
Medicare Wellness  Personal Prevention Plan of Service     Date of Office Visit:    Encounter Provider:  Zamzam Amador PA-C  Place of Service:  [facility]  Patient Name: Carli Carver  :  1935    As part of the Medicare Wellness portion of your visit today, we are providing you with this personalized preventive plan of services (PPPS). This plan is based upon recommendations of the [place] Preventive Services Task Force (USPSTF) and the Advisory Committee on Immunization Practices (ACIP).    This lists the preventive care services that should be considered, and provides dates of when you are due. Items listed as completed are up-to-date and do not require any further intervention.    Health Maintenance   Topic Date Due    INFLUENZA VACCINE  2024    COVID-19 Vaccine ( - -24 season) 09/15/2024 (Originally 2024)    DXA SCAN  09/15/2024 (Originally 3/30/2020)    ZOSTER VACCINE (1 of 2) 09/15/2024 (Originally 1987)    RSV Vaccine - Adults (1 - 1-dose 60+ series) 2025 (Originally 1997)    Pneumococcal Vaccine 65+ (1 of 2 - PCV) 2025 (Originally 1943)    TDAP/TD VACCINES (1 - Tdap) 2025 (Originally 1956)    HEMOGLOBIN A1C  2025    DIABETIC EYE EXAM  2025    LIPID PANEL  2025    URINE MICROALBUMIN  2025    ANNUAL WELLNESS VISIT  2025       Orders Placed This Encounter   Procedures    ECG 12 Lead     This order was created via procedure documentation     Order Specific Question:   Release to patient     Answer:   Routine Release [[id number]]       No follow-ups on file.        BMI for Adults  Body mass index (BMI) is a number found using a person's weight and height. BMI can help tell how much of a person's weight is made up of fat. BMI does not measure body fat directly. It is used instead of tests that directly measure body fat, which can be difficult and expensive.  What are  "BMI measurements used for?  BMI is useful to:  Find out if your weight puts you at higher risk for medical problems.  Help recommend changes, such as in diet and exercise. This can help you reach a healthy weight. BMI screening can be done again to see if these changes are working.  How is BMI calculated?  Your height and weight are measured. The BMI is found from those numbers. This can be done with [place] or metric measurements. Note that charts and online BMI calculators are available to help you find your BMI quickly and easily without doing these calculations.  To calculate your BMI in [place] measurements:  Measure your weight in pounds (lb).  Multiply the number of pounds by 703.  So, for an adult who weighs 150 lb, multiply that number by 703: 150 x 703, which equals 105,450.  Measure your height in inches. Then multiply that number by itself to get a measurement called \"inches squared.\"  So, for an adult who is 70 inches tall, the \"inches squared\" measurement is 70 inches x 70 inches, which equals 4,900 inches squared.  Divide the total from step 2 (number of lb x 703) by the total from step 3 (inches squared): 105,450 ÷ 4,900 = 21.5. This is your BMI.  To calculate your BMI in metric measurements:    Measure your weight in kilograms (kg).  For this example, the weight is 70 kg.  Measure your height in meters (m). Then multiply that number by itself to get a measurement called \"meters squared.\"  So, for an adult who is 1.75 m tall, the \"meters squared\" measurement is 1.75 m x 1.75 m, which equals 3.1 meters squared.  Divide the number of kilograms (your weight) by the meters squared number. In this example: 70 ÷ 3.1 = 22.6. This is your BMI.  What do the results mean?  BMI charts are used to see if you are underweight, normal weight, overweight, or obese. The following guidelines will be used:  Underweight: BMI less than 18.5.  Normal weight: BMI between 18.5 and 24.9.  Overweight: BMI between 25 and " 29.9.  Obese: BMI of 30 or above.  BMI is a tool and cannot diagnose a condition. Talk with your health care provider about what your BMI means for you. Keep these notes in mind:  Weight includes fat and muscle. Someone with a muscular build, such as an athlete, may have a BMI that is higher than 24.9. In cases like these, BMI is not a correct measure of body fat.  If you have a BMI of 25 or higher, your provider may need to do more testing to find out if excess body fat is the cause.  BMI is measured the same way for males and females. Females usually have more body fat than males of the same height and weight.  Where to find more information  For more information about BMI, including tools to quickly find your BMI, go to:  Centers for Disease Control and Prevention: cdc.gov  American Heart Association: heart.org  National Heart, Lung, and Blood [place]: nhlbi.nih.gov  This information is not intended to replace advice given to you by your health care provider. Make sure you discuss any questions you have with your health care provider.  Document Revised: 09/07/2023 Document Reviewed: 08/31/2023  [employer] Patient Education © 2024 [employer]  Advance Directive    Advance directives are legal documents that allow you to make decisions about your health care and medical treatment in case you become unable to communicate for yourself. Advance directives let your wishes be known to family, friends, and health care providers.  Discussing and writing advance directives should happen over time rather than all at once. Advance directives can be changed and updated at any time. There are different types of advance directives, such as:  Medical power of .  Living will.  Do not resuscitate (DNR) order or do not attempt resuscitation (DNAR) order.  Health care proxy and medical power of   A health care proxy is also called a health care agent. This person is appointed to make medical decisions for you when  you are unable to make decisions for yourself. Generally, people ask a trusted friend or family member to act as their proxy and represent their preferences. Make sure you have an agreement with your trusted person to act as your proxy. A proxy may have to make a medical decision on your behalf if your wishes are not known.  A medical power of , also called a durable power of  for health care, is a legal document that names your health care proxy. Depending on the laws in your state, the document may need to be:  Signed.  Notarized.  Dated.  Copied.  Witnessed.  Incorporated into your medical record.  You may also want to appoint a trusted person to manage your money in the event you are unable to do so. This is called a durable power of  for finances. It is a separate legal document from the durable power of  for health care. You may choose your health care proxy or someone different to act as your agent in money matters.  If you do not appoint a proxy, or there is a concern that the proxy is not acting in your best interest, a court may appoint a guardian to act on your behalf.  Living will  A living will is a set of instructions that state your wishes about medical care when you cannot express them yourself. Health care providers should keep a copy of your living will in your medical record. You may want to give a copy to family members or friends. To alert caregivers in case of an emergency, you can place a card in your wallet to let them know that you have a living will and where they can find it. A living will is used if you become:  Terminally ill.  Disabled.  Unable to communicate or make decisions.  The following decisions should be included in your living will:  To use or not to use life support equipment, such as dialysis machines and breathing machines (ventilators).  Whether you want a DNR or DNAR order. This tells health care providers not to use cardiopulmonary  resuscitation (CPR) if breathing or heartbeat stops.  To use or not to use tube feeding.  To be given or not to be given food and fluids.  Whether you want comfort (palliative) care when the goal becomes comfort rather than a cure.  Whether you want to donate your organs and tissues.  A living will does not give instructions for distributing your money and property if you should pass away.  DNR or DNAR  A DNR or DNAR order is a request not to have CPR in the event that your heart stops beating or you stop breathing. If a DNR or DNAR order has not been made and shared, a health care provider will try to help any patient whose heart has stopped or who has stopped breathing. If you plan to have surgery, talk with your health care provider about how your DNR or DNAR order will be followed if problems occur.  What if I do not have an advance directive?  Some states assign family decision makers to act on your behalf if you do not have an advance directive. Each state has its own laws about advance directives. You may want to check with your health care provider, , or state representative about the laws in your state.  Summary  Advance directives are legal documents that allow you to make decisions about your health care and medical treatment in case you become unable to communicate for yourself.  The process of discussing and writing advance directives should happen over time. You can change and update advance directives at any time.  Advance directives may include a medical power of , a living will, and a DNR or DNAR order.  This information is not intended to replace advice given to you by your health care provider. Make sure you discuss any questions you have with your health care provider.  Document Revised: 09/21/2021 Document Reviewed: 09/21/2021  [employer] Patient Education © 2024 [employer]     no No

## 2024-10-31 ENCOUNTER — OFFICE VISIT (OUTPATIENT)
Dept: INTERNAL MEDICINE | Facility: CLINIC | Age: 89
End: 2024-10-31
Payer: MEDICARE

## 2024-10-31 ENCOUNTER — LAB (OUTPATIENT)
Dept: LAB | Facility: HOSPITAL | Age: 89
End: 2024-10-31
Payer: MEDICARE

## 2024-10-31 VITALS
BODY MASS INDEX: 25.1 KG/M2 | DIASTOLIC BLOOD PRESSURE: 74 MMHG | TEMPERATURE: 98 F | OXYGEN SATURATION: 99 % | WEIGHT: 136.4 LBS | HEIGHT: 62 IN | SYSTOLIC BLOOD PRESSURE: 130 MMHG | HEART RATE: 61 BPM

## 2024-10-31 DIAGNOSIS — I48.21 PERMANENT ATRIAL FIBRILLATION: ICD-10-CM

## 2024-10-31 DIAGNOSIS — E78.2 MIXED HYPERLIPIDEMIA: ICD-10-CM

## 2024-10-31 DIAGNOSIS — I10 ESSENTIAL HYPERTENSION: ICD-10-CM

## 2024-10-31 DIAGNOSIS — E11.9 TYPE 2 DIABETES MELLITUS WITHOUT COMPLICATION, UNSPECIFIED WHETHER LONG TERM INSULIN USE: Primary | ICD-10-CM

## 2024-10-31 DIAGNOSIS — E03.9 ACQUIRED HYPOTHYROIDISM: ICD-10-CM

## 2024-10-31 LAB
ALBUMIN SERPL-MCNC: 4.5 G/DL (ref 3.5–5.2)
ALBUMIN UR-MCNC: <1.2 MG/DL
ALBUMIN/GLOB SERPL: 1.6 G/DL
ALP SERPL-CCNC: 94 U/L (ref 39–117)
ALT SERPL W P-5'-P-CCNC: 20 U/L (ref 1–33)
ANION GAP SERPL CALCULATED.3IONS-SCNC: 10 MMOL/L (ref 5–15)
AST SERPL-CCNC: 20 U/L (ref 1–32)
BASOPHILS # BLD AUTO: 0.02 10*3/MM3 (ref 0–0.2)
BASOPHILS NFR BLD AUTO: 0.2 % (ref 0–1.5)
BILIRUB SERPL-MCNC: 1.4 MG/DL (ref 0–1.2)
BUN SERPL-MCNC: 13 MG/DL (ref 8–23)
BUN/CREAT SERPL: 14.9 (ref 7–25)
CALCIUM SPEC-SCNC: 9.6 MG/DL (ref 8.6–10.5)
CHLORIDE SERPL-SCNC: 103 MMOL/L (ref 98–107)
CHOLEST SERPL-MCNC: 148 MG/DL (ref 0–200)
CO2 SERPL-SCNC: 25 MMOL/L (ref 22–29)
CREAT SERPL-MCNC: 0.87 MG/DL (ref 0.57–1)
DEPRECATED RDW RBC AUTO: 43.3 FL (ref 37–54)
EGFRCR SERPLBLD CKD-EPI 2021: 64.6 ML/MIN/1.73
EOSINOPHIL # BLD AUTO: 0.08 10*3/MM3 (ref 0–0.4)
EOSINOPHIL NFR BLD AUTO: 0.9 % (ref 0.3–6.2)
ERYTHROCYTE [DISTWIDTH] IN BLOOD BY AUTOMATED COUNT: 11.9 % (ref 12.3–15.4)
GLOBULIN UR ELPH-MCNC: 2.9 GM/DL
GLUCOSE SERPL-MCNC: 110 MG/DL (ref 65–99)
HBA1C MFR BLD: 6 % (ref 4.8–5.6)
HCT VFR BLD AUTO: 42.1 % (ref 34–46.6)
HDLC SERPL-MCNC: 54 MG/DL (ref 40–60)
HGB BLD-MCNC: 14.9 G/DL (ref 12–15.9)
IMM GRANULOCYTES # BLD AUTO: 0.03 10*3/MM3 (ref 0–0.05)
IMM GRANULOCYTES NFR BLD AUTO: 0.3 % (ref 0–0.5)
LDLC SERPL CALC-MCNC: 74 MG/DL (ref 0–100)
LDLC/HDLC SERPL: 1.33 {RATIO}
LYMPHOCYTES # BLD AUTO: 2.1 10*3/MM3 (ref 0.7–3.1)
LYMPHOCYTES NFR BLD AUTO: 24.2 % (ref 19.6–45.3)
MCH RBC QN AUTO: 34.4 PG (ref 26.6–33)
MCHC RBC AUTO-ENTMCNC: 35.4 G/DL (ref 31.5–35.7)
MCV RBC AUTO: 97.2 FL (ref 79–97)
MONOCYTES # BLD AUTO: 0.67 10*3/MM3 (ref 0.1–0.9)
MONOCYTES NFR BLD AUTO: 7.7 % (ref 5–12)
NEUTROPHILS NFR BLD AUTO: 5.76 10*3/MM3 (ref 1.7–7)
NEUTROPHILS NFR BLD AUTO: 66.7 % (ref 42.7–76)
NRBC BLD AUTO-RTO: 0 /100 WBC (ref 0–0.2)
PLATELET # BLD AUTO: 218 10*3/MM3 (ref 140–450)
PMV BLD AUTO: 9.9 FL (ref 6–12)
POTASSIUM SERPL-SCNC: 4.3 MMOL/L (ref 3.5–5.2)
PROT SERPL-MCNC: 7.4 G/DL (ref 6–8.5)
RBC # BLD AUTO: 4.33 10*6/MM3 (ref 3.77–5.28)
SODIUM SERPL-SCNC: 138 MMOL/L (ref 136–145)
T3FREE SERPL-MCNC: 3.31 PG/ML (ref 2–4.4)
T4 FREE SERPL-MCNC: 1.7 NG/DL (ref 0.92–1.68)
TRIGL SERPL-MCNC: 112 MG/DL (ref 0–150)
TSH SERPL DL<=0.05 MIU/L-ACNC: 0.45 UIU/ML (ref 0.27–4.2)
VLDLC SERPL-MCNC: 20 MG/DL (ref 5–40)
WBC NRBC COR # BLD AUTO: 8.66 10*3/MM3 (ref 3.4–10.8)

## 2024-10-31 PROCEDURE — 1126F AMNT PAIN NOTED NONE PRSNT: CPT | Performed by: PHYSICIAN ASSISTANT

## 2024-10-31 PROCEDURE — 99214 OFFICE O/P EST MOD 30 MIN: CPT | Performed by: PHYSICIAN ASSISTANT

## 2024-10-31 PROCEDURE — G2211 COMPLEX E/M VISIT ADD ON: HCPCS | Performed by: PHYSICIAN ASSISTANT

## 2024-10-31 PROCEDURE — 84443 ASSAY THYROID STIM HORMONE: CPT | Performed by: PHYSICIAN ASSISTANT

## 2024-10-31 PROCEDURE — 83036 HEMOGLOBIN GLYCOSYLATED A1C: CPT | Performed by: PHYSICIAN ASSISTANT

## 2024-10-31 PROCEDURE — 80061 LIPID PANEL: CPT | Performed by: PHYSICIAN ASSISTANT

## 2024-10-31 PROCEDURE — 85025 COMPLETE CBC W/AUTO DIFF WBC: CPT | Performed by: PHYSICIAN ASSISTANT

## 2024-10-31 PROCEDURE — 84439 ASSAY OF FREE THYROXINE: CPT | Performed by: PHYSICIAN ASSISTANT

## 2024-10-31 PROCEDURE — 82043 UR ALBUMIN QUANTITATIVE: CPT | Performed by: PHYSICIAN ASSISTANT

## 2024-10-31 PROCEDURE — 80053 COMPREHEN METABOLIC PANEL: CPT | Performed by: PHYSICIAN ASSISTANT

## 2024-10-31 PROCEDURE — 84481 FREE ASSAY (FT-3): CPT | Performed by: PHYSICIAN ASSISTANT

## 2024-10-31 NOTE — PROGRESS NOTES
[facility] Internal Medicine    Carli Carver  1935   [id number]      Patient Care Team:  Zamzam Amador PA-C as PCP - General (Physician Assistant)  Eric Almodovar DO as Consulting Physician (Cardiology)  SALBADOR Nicole Jr., MD as Consulting Physician (Ophthalmology)  Lida Lin APRN as Nurse Practitioner (Family Medicine)  Michael Jamison MD (Optometry)  Owen Wood III, MD as Cardiologist (Cardiology)    Chief Complaint::   Chief Complaint   Patient presents with    Type 2 Diabetes     3 month follow-up        HPI  Carli Carver is an 89-year-old female date of birth 1935 who presents today for 3-month follow-up.  Past medical history significant for  hypertension, cardiac pacemaker, atrial fibrillation with chronic anticoagulation, hyperlipidemia, hypothyroidism, type 2 diabetes.    Currently takes metformin  mg daily due to A1c of 6.9%.  She remains active, walking outside or at the mall.  She denies chest pain, shortness of breath, palpitations, or headaches.      Patient Active Problem List   Diagnosis    Essential hypertension    Cardiac pacemaker in situ    Complete heart block    Permanent atrial fibrillation    Chronic anticoagulation    Mixed hyperlipidemia    Acquired hypothyroidism    Type II diabetes mellitus    Vertigo    Medicare annual wellness visit, subsequent        Past Medical History:   Diagnosis Date    A-fib     Atrial flutter     Bradycardia     Cataract     Constipation     Heartburn     Hyperlipidemia     Hypothyroid     Wears glasses        Past Surgical History:   Procedure Laterality Date    ABLATION OF DYSRHYTHMIC FOCUS      CARDIAC ELECTROPHYSIOLOGY PROCEDURE N/A 7/15/2020    Procedure: AV node ablation- pt has SJM pacemaker;  Surgeon: Eric Almodovar DO;  Location: [facility] INVASIVE LOCATION;  Service: Cardiovascular;  Laterality: N/A;    CARDIOVERSION      CATARACT EXTRACTION Right     CATARACT EXTRACTION      COLONOSCOPY       "INSERT / REPLACE / REMOVE PACEMAKER  08/29/2014    [facility], Location: [facility]    TUBAL ABDOMINAL LIGATION         Family History   Problem Relation Age of Onset    Lung cancer Mother     Heart disease Brother        Social History     Socioeconomic History    Marital status:    Tobacco Use    Smoking status: Never     Passive exposure: Never    Smokeless tobacco: Never   Vaping Use    Vaping status: Never Used   Substance and Sexual Activity    Alcohol use: No    Drug use: Never    Sexual activity: Defer       No Known Allergies    Review of Systems   Constitutional:  Negative for activity change, appetite change, diaphoresis, fatigue, unexpected weight gain and unexpected weight loss.   HENT:  Negative for hearing loss.    Eyes:  Negative for visual disturbance.   Respiratory:  Negative for chest tightness and shortness of breath.    Cardiovascular:  Negative for chest pain, palpitations and leg swelling.   Gastrointestinal:  Negative for abdominal pain, blood in stool, GERD and indigestion.   Endocrine: Negative for cold intolerance and heat intolerance.   Genitourinary:  Negative for dysuria and hematuria.   Musculoskeletal:  Negative for arthralgias and myalgias.   Skin:  Negative for skin lesions.   Neurological:  Negative for tremors, seizures, syncope, speech difficulty, weakness, headache, memory problem and confusion.   Hematological:  Does not bruise/bleed easily.   Psychiatric/Behavioral:  Negative for sleep disturbance and depressed mood. The patient is not nervous/anxious.         Vital Signs  Vitals:    10/31/24 1020   BP: 130/74   BP Location: Left arm   Patient Position: Sitting   Cuff Size: Adult   Pulse: 61   Temp: 98 °F (36.7 °C)   TempSrc: Infrared   SpO2: 99%   Weight: 61.9 kg (136 lb 6.4 oz)   Height: 157.5 cm (62.01\")   PainSc: 0-No pain     Body mass index is 24.94 kg/m².  BMI is within normal parameters. No other follow-up for BMI required.     Advance Care Planning "   ACP discussion was held with the patient during this visit. Patient does not have an advance directive, information provided.       Current Outpatient Medications:     ascorbic acid (VITAMIN C) 1000 MG tablet, Take 1 tablet by mouth Daily., Disp: , Rfl:     atorvastatin (LIPITOR) 20 MG tablet, TAKE ONE TABLET BY MOUTH ONCE NIGHTLY, Disp: 90 tablet, Rfl: 1    B Complex Vitamins (Vitamin B Complex) tablet, Take 1 tablet by mouth Daily., Disp: , Rfl:     BIOTIN PO, Take 5,000 mcg by mouth Daily., Disp: , Rfl:     Calcium-Magnesium-Vitamin D (CALCIUM 1200+D3 PO), Take 1 tablet by mouth Daily. 1000 units d3, Disp: , Rfl:     Cholecalciferol (Vitamin D3) 50 MCG (2000 UT) capsule, Take 1 capsule by mouth Daily., Disp: , Rfl:     docusate sodium (COLACE) 100 MG capsule, Take 1 capsule by mouth As Needed., Disp: , Rfl:     Eliquis 5 MG tablet tablet, TAKE 1 TABLET BY MOUTH TWICE A DAY, Disp: 60 tablet, Rfl: 6    levothyroxine (SYNTHROID, LEVOTHROID) 88 MCG tablet, TAKE 1 TABLET BY MOUTH DAILY, Disp: 90 tablet, Rfl: 1    Magnesium 500 MG tablet, Take 1 tablet by mouth Daily., Disp: , Rfl:     melatonin 5 MG tablet tablet, Take 1 tablet by mouth At Night As Needed., Disp: , Rfl:     metFORMIN ER (GLUCOPHAGE-XR) 500 MG 24 hr tablet, Take 1 tablet by mouth Daily With Breakfast., Disp: 90 tablet, Rfl: 1    Multiple Vitamins-Minerals (MULTIVITAMIN ADULT PO), Take 1 tablet by mouth Daily., Disp: , Rfl:     nitroglycerin (NITROSTAT) 0.4 MG SL tablet, 1 under the tongue as needed for angina, may repeat q5mins for up three doses, Disp: 100 tablet, Rfl: 11    Omega-3 Fatty Acids (fish oil) 1200 MG capsule capsule, Take 1 capsule by mouth Daily., Disp: , Rfl:     Physical Exam  Vitals reviewed.   Constitutional:       Appearance: Normal appearance. She is well-developed.   HENT:      Head: Normocephalic and atraumatic.      Right Ear: Hearing, tympanic membrane, ear canal and external ear normal.      Left Ear: Hearing, tympanic  membrane, ear canal and external ear normal.      Nose: Nose normal.      Mouth/Throat:      Pharynx: Uvula midline.   Eyes:      General: Lids are normal.      Conjunctiva/sclera: Conjunctivae normal.      Pupils: Pupils are equal, round, and reactive to light.   Cardiovascular:      Rate and Rhythm: Normal rate and regular rhythm.      Heart sounds: Normal heart sounds.   Pulmonary:      Effort: Pulmonary effort is normal.      Breath sounds: Normal breath sounds.   Abdominal:      General: Bowel sounds are normal.      Palpations: Abdomen is soft.   Musculoskeletal:         General: Normal range of motion.      Cervical back: Full passive range of motion without pain, normal range of motion and neck supple.   Skin:     General: Skin is warm and dry.   Neurological:      Mental Status: She is alert and oriented to person, place, and time.      Deep Tendon Reflexes: Reflexes are normal and symmetric.   Psychiatric:         Speech: Speech normal.         Behavior: Behavior normal.         Thought Content: Thought content normal.         Judgment: Judgment normal.          ACE III MINI            Results Review:    No results found for this or any previous visit (from the past 4 weeks).  Procedures    Medication Review: Medications reviewed and noted    Social History     Socioeconomic History    Marital status:    Tobacco Use    Smoking status: Never     Passive exposure: Never    Smokeless tobacco: Never   Vaping Use    Vaping status: Never Used   Substance and Sexual Activity    Alcohol use: No    Drug use: Never    Sexual activity: Defer        Assessment/Plan:    Diagnoses and all orders for this visit:    1. Type 2 diabetes mellitus without complication, unspecified whether long term insulin use (Primary)  Overview:  7/2024 A1C 6.9%.  Continue metformin  mg daily. She denies any stomach upset or diarrhea.  Reviewed diet and exercise.  Discussed importance of daily walking.  Continue to cut back on  processed foods and sugars in the diet.    Orders:  -     Hemoglobin A1c; Future    2. Acquired hypothyroidism  Overview:  Continue levothyroxine 88 mcg daily.    Orders:  -     TSH; Future  -     T4, Free; Future  -     T3, Free; Future    3. Essential hypertension  Overview:  Much improved.  She will continue to monitor at home.      4. Mixed hyperlipidemia  Overview:  Excellent lipid profile.  Patient currently taking atorvastatin 20 mg tablets daily.    Orders:  -     CBC & Differential; Future  -     Comprehensive Metabolic Panel; Future  -     Lipid Panel; Future  -     MicroAlbumin, Urine, Random - Urine, Clean Catch; Future    5. Permanent atrial fibrillation  Overview:  AV node catheter ablation, 7/15/2020  Continue Eliquis 5 mg twice daily.           Plan of care reviewed with patient at the conclusion of today's visit. Education was provided regarding diagnosis, management, and any prescribed or recommended OTC medications.Patient verbalizes understanding of and agreement with management plan.         I have seen Carli on this date of service:preparing for the visit, reviewing tests, obtaining and/or reviewing a separately obtained history, performing a medically appropriate examination and/or evaluation , counseling and educating the patient/family/caregiver, ordering medications, tests, or procedures, referring and communicating with other health care professionals , and documenting information in the medical record    Zamzam Amador PA-C      Note: Part of this note may be an electronic transcription/translation of spoken language to printed text using the Dragon Dictation system.

## 2025-01-03 RX ORDER — ATORVASTATIN CALCIUM 20 MG/1
TABLET, FILM COATED ORAL
Qty: 90 TABLET | Refills: 1 | Status: SHIPPED | OUTPATIENT
Start: 2025-01-03

## 2025-01-07 RX ORDER — LEVOTHYROXINE SODIUM 88 UG/1
88 TABLET ORAL DAILY
Qty: 90 TABLET | Refills: 1 | Status: SHIPPED | OUTPATIENT
Start: 2025-01-07

## 2025-01-09 DIAGNOSIS — E11.9 TYPE 2 DIABETES MELLITUS WITHOUT COMPLICATION, UNSPECIFIED WHETHER LONG TERM INSULIN USE: ICD-10-CM

## 2025-01-09 RX ORDER — METFORMIN HYDROCHLORIDE 500 MG/1
500 TABLET, EXTENDED RELEASE ORAL
Qty: 90 TABLET | Refills: 1 | Status: SHIPPED | OUTPATIENT
Start: 2025-01-09

## 2025-01-09 NOTE — TELEPHONE ENCOUNTER
Rx Refill Note  Requested Prescriptions     Pending Prescriptions Disp Refills    metFORMIN ER (GLUCOPHAGE-XR) 500 MG 24 hr tablet 90 tablet 1     Sig: Take 1 tablet by mouth Daily With Breakfast.      Last office visit with prescribing clinician: 10/31/2024   Last telemedicine visit with prescribing clinician: Visit date not found   Next office visit with prescribing clinician: 1/30/2025                         Would you like a call back once the refill request has been completed: [] Yes [] No    If the office needs to give you a call back, can they leave a voicemail: [] Yes [] No    Janis Bundy MA  01/09/25, 08:48 EST

## 2025-01-09 NOTE — TELEPHONE ENCOUNTER
Caller: Mehul Carli Clement    Relationship: Self    Best call back number: [phone]     Requested Prescriptions:   Requested Prescriptions     Pending Prescriptions Disp Refills    metFORMIN ER (GLUCOPHAGE-XR) 500 MG 24 hr tablet 90 tablet 1     Sig: Take 1 tablet by mouth Daily With Breakfast.        Pharmacy where request should be sent: [facility] [id number] [phone] [facility] [phone] FX     Last office visit with prescribing clinician: 10/31/2024   Last telemedicine visit with prescribing clinician: Visit date not found   Next office visit with prescribing clinician: 1/30/2025     Additional details provided by patient: PATIENT HAS 3 DAYS LEFT OF MEDICATION     Does the patient have less than a 3 day supply:  [x] Yes     Would you like a call back once the refill request has been completed: [] Yes [x] No    If the office needs to give you a call back, can they leave a voicemail: [] Yes [x] No    Mari Lyles MA   01/09/25 08:40 EST

## 2025-01-29 ENCOUNTER — TELEPHONE (OUTPATIENT)
Dept: INTERNAL MEDICINE | Facility: CLINIC | Age: 89
End: 2025-01-29

## 2025-01-29 NOTE — TELEPHONE ENCOUNTER
LM advising pt that you are out of the office on Wednesdays and advised that we could just order labs at visit tomorrow.

## 2025-01-29 NOTE — TELEPHONE ENCOUNTER
Caller: Carli Carver    Relationship: Self    Best call back number: [phone]     What orders are you requesting (i.e. lab or imaging): LAB    In what timeframe would the patient need to come in: TODAY    Where will you receive your lab/imaging services: OFFICE    Additional notes: PATIENT WOULD LIKE TO KNOW IF SHE NEEDS TO HAVE LABS DONE TODAY BEFORE HER APPOINTMENT TOMORROW

## 2025-01-30 ENCOUNTER — LAB (OUTPATIENT)
Dept: LAB | Facility: HOSPITAL | Age: 89
End: 2025-01-30
Payer: MEDICARE

## 2025-01-30 ENCOUNTER — OFFICE VISIT (OUTPATIENT)
Dept: INTERNAL MEDICINE | Facility: CLINIC | Age: 89
End: 2025-01-30
Payer: MEDICARE

## 2025-01-30 VITALS
WEIGHT: 135.4 LBS | DIASTOLIC BLOOD PRESSURE: 78 MMHG | TEMPERATURE: 97.7 F | SYSTOLIC BLOOD PRESSURE: 138 MMHG | BODY MASS INDEX: 24.92 KG/M2 | OXYGEN SATURATION: 98 % | HEIGHT: 62 IN | HEART RATE: 70 BPM

## 2025-01-30 DIAGNOSIS — E03.9 ACQUIRED HYPOTHYROIDISM: ICD-10-CM

## 2025-01-30 DIAGNOSIS — I44.2 COMPLETE HEART BLOCK: ICD-10-CM

## 2025-01-30 DIAGNOSIS — H91.8X2 OTHER SPECIFIED HEARING LOSS OF LEFT EAR, UNSPECIFIED HEARING STATUS ON CONTRALATERAL SIDE: ICD-10-CM

## 2025-01-30 DIAGNOSIS — E11.9 TYPE 2 DIABETES MELLITUS WITHOUT COMPLICATION, UNSPECIFIED WHETHER LONG TERM INSULIN USE: Primary | ICD-10-CM

## 2025-01-30 DIAGNOSIS — E11.9 TYPE 2 DIABETES MELLITUS WITHOUT COMPLICATION, UNSPECIFIED WHETHER LONG TERM INSULIN USE: ICD-10-CM

## 2025-01-30 DIAGNOSIS — Z95.0 CARDIAC PACEMAKER IN SITU: ICD-10-CM

## 2025-01-30 DIAGNOSIS — E78.2 MIXED HYPERLIPIDEMIA: ICD-10-CM

## 2025-01-30 DIAGNOSIS — I10 ESSENTIAL HYPERTENSION: ICD-10-CM

## 2025-01-30 DIAGNOSIS — R41.3 MEMORY IMPAIRMENT: ICD-10-CM

## 2025-01-30 LAB
ALBUMIN SERPL-MCNC: 4.4 G/DL (ref 3.5–5.2)
ALBUMIN/GLOB SERPL: 1.5 G/DL
ALP SERPL-CCNC: 102 U/L (ref 39–117)
ALT SERPL W P-5'-P-CCNC: 19 U/L (ref 1–33)
ANION GAP SERPL CALCULATED.3IONS-SCNC: 13 MMOL/L (ref 5–15)
AST SERPL-CCNC: 24 U/L (ref 1–32)
BASOPHILS # BLD AUTO: 0.01 10*3/MM3 (ref 0–0.2)
BASOPHILS NFR BLD AUTO: 0.2 % (ref 0–1.5)
BILIRUB SERPL-MCNC: 1.5 MG/DL (ref 0–1.2)
BUN SERPL-MCNC: 13 MG/DL (ref 8–23)
BUN/CREAT SERPL: 14.9 (ref 7–25)
CALCIUM SPEC-SCNC: 10 MG/DL (ref 8.6–10.5)
CHLORIDE SERPL-SCNC: 104 MMOL/L (ref 98–107)
CHOLEST SERPL-MCNC: 147 MG/DL (ref 0–200)
CO2 SERPL-SCNC: 25 MMOL/L (ref 22–29)
CREAT SERPL-MCNC: 0.87 MG/DL (ref 0.57–1)
DEPRECATED RDW RBC AUTO: 40.5 FL (ref 37–54)
EGFRCR SERPLBLD CKD-EPI 2021: 64.2 ML/MIN/1.73
EOSINOPHIL # BLD AUTO: 0.09 10*3/MM3 (ref 0–0.4)
EOSINOPHIL NFR BLD AUTO: 1.5 % (ref 0.3–6.2)
ERYTHROCYTE [DISTWIDTH] IN BLOOD BY AUTOMATED COUNT: 11.8 % (ref 12.3–15.4)
EXPIRATION DATE: ABNORMAL
GLOBULIN UR ELPH-MCNC: 3 GM/DL
GLUCOSE SERPL-MCNC: 112 MG/DL (ref 65–99)
HBA1C MFR BLD: 6.7 % (ref 4.5–5.7)
HCT VFR BLD AUTO: 42.5 % (ref 34–46.6)
HDLC SERPL-MCNC: 56 MG/DL (ref 40–60)
HGB BLD-MCNC: 14.6 G/DL (ref 12–15.9)
IMM GRANULOCYTES # BLD AUTO: 0.02 10*3/MM3 (ref 0–0.05)
IMM GRANULOCYTES NFR BLD AUTO: 0.3 % (ref 0–0.5)
LDLC SERPL CALC-MCNC: 71 MG/DL (ref 0–100)
LDLC/HDLC SERPL: 1.23 {RATIO}
LYMPHOCYTES # BLD AUTO: 2.12 10*3/MM3 (ref 0.7–3.1)
LYMPHOCYTES NFR BLD AUTO: 36.2 % (ref 19.6–45.3)
Lab: ABNORMAL
MCH RBC QN AUTO: 32.4 PG (ref 26.6–33)
MCHC RBC AUTO-ENTMCNC: 34.4 G/DL (ref 31.5–35.7)
MCV RBC AUTO: 94.2 FL (ref 79–97)
MONOCYTES # BLD AUTO: 0.58 10*3/MM3 (ref 0.1–0.9)
MONOCYTES NFR BLD AUTO: 9.9 % (ref 5–12)
NEUTROPHILS NFR BLD AUTO: 3.03 10*3/MM3 (ref 1.7–7)
NEUTROPHILS NFR BLD AUTO: 51.9 % (ref 42.7–76)
NRBC BLD AUTO-RTO: 0 /100 WBC (ref 0–0.2)
PLATELET # BLD AUTO: 201 10*3/MM3 (ref 140–450)
PMV BLD AUTO: 10.1 FL (ref 6–12)
POTASSIUM SERPL-SCNC: 4.3 MMOL/L (ref 3.5–5.2)
PROT SERPL-MCNC: 7.4 G/DL (ref 6–8.5)
RBC # BLD AUTO: 4.51 10*6/MM3 (ref 3.77–5.28)
SODIUM SERPL-SCNC: 142 MMOL/L (ref 136–145)
T3FREE SERPL-MCNC: 3.46 PG/ML (ref 2–4.4)
T4 FREE SERPL-MCNC: 1.48 NG/DL (ref 0.92–1.68)
TRIGL SERPL-MCNC: 110 MG/DL (ref 0–150)
TSH SERPL DL<=0.05 MIU/L-ACNC: 0.49 UIU/ML (ref 0.27–4.2)
VIT B12 BLD-MCNC: 642 PG/ML (ref 211–946)
VLDLC SERPL-MCNC: 20 MG/DL (ref 5–40)
WBC NRBC COR # BLD AUTO: 5.85 10*3/MM3 (ref 3.4–10.8)

## 2025-01-30 PROCEDURE — 83036 HEMOGLOBIN GLYCOSYLATED A1C: CPT

## 2025-01-30 PROCEDURE — 1159F MED LIST DOCD IN RCRD: CPT | Performed by: PHYSICIAN ASSISTANT

## 2025-01-30 PROCEDURE — 84443 ASSAY THYROID STIM HORMONE: CPT

## 2025-01-30 PROCEDURE — 83036 HEMOGLOBIN GLYCOSYLATED A1C: CPT | Performed by: PHYSICIAN ASSISTANT

## 2025-01-30 PROCEDURE — 1160F RVW MEDS BY RX/DR IN RCRD: CPT | Performed by: PHYSICIAN ASSISTANT

## 2025-01-30 PROCEDURE — 1126F AMNT PAIN NOTED NONE PRSNT: CPT | Performed by: PHYSICIAN ASSISTANT

## 2025-01-30 PROCEDURE — 82043 UR ALBUMIN QUANTITATIVE: CPT

## 2025-01-30 PROCEDURE — 85025 COMPLETE CBC W/AUTO DIFF WBC: CPT

## 2025-01-30 PROCEDURE — 3044F HG A1C LEVEL LT 7.0%: CPT | Performed by: PHYSICIAN ASSISTANT

## 2025-01-30 PROCEDURE — 99214 OFFICE O/P EST MOD 30 MIN: CPT | Performed by: PHYSICIAN ASSISTANT

## 2025-01-30 PROCEDURE — 82607 VITAMIN B-12: CPT

## 2025-01-30 PROCEDURE — 80053 COMPREHEN METABOLIC PANEL: CPT

## 2025-01-30 PROCEDURE — 84439 ASSAY OF FREE THYROXINE: CPT

## 2025-01-30 PROCEDURE — 84481 FREE ASSAY (FT-3): CPT

## 2025-01-30 PROCEDURE — 80061 LIPID PANEL: CPT

## 2025-01-30 NOTE — PROGRESS NOTES
Office Note     Name: Carli Carver    : 1935     MRN: [id number]     Chief Complaint  Diabetes (3 months follow-up)    Subjective     History of Present Illness:  Carli Carver is a 89 y.o. female who presents today for 3-month follow-up.  Past medical history significant for type 2 diabetes, hyperlipidemia, hypothyroidism, and atrial fibrillation with cardiac pacemaker.    History of Present Illness  The patient is an 89-year-old female who presents for evaluation of diabetes, memory decline, and left ear hearing loss. She is accompanied by her daughter.     She reports no chest pain or shortness of breath. She is due for a pacemaker battery replacement, which was initially scheduled but has been postponed to March. She has a monitor at home that records her heart activity and sends it to her cardiologist. She has not received any communication from her cardiologist regarding any abnormalities. Her appetite remains robust. She experiences intermittent sleep disturbances, with some nights being restful and others not. She engages in mental exercises such as crossword puzzles and word searches daily. She has not started any new supplements or medications prescribed by other providers. She reports no difficulty swallowing, choking, or coughing while taking medications. She is currently fasting.    She believes her memory is generally good, although her daughter has observed minor changes. She attributes these changes to the stress she has been under for the past 5 years. She reports no visual changes and uses reading glasses for close work.    She has noticed a change in her left ear hearing, but her right ear appears to be functioning well. She recalls a previous visit where earwax was removed from her left ear.    FAMILY HISTORY  Her brother was diagnosed with esophageal cancer and has been going through treatments. OCD runs in her family.    Review of Systems:   Review of Systems    Constitutional:  Positive for unexpected weight loss. Negative for activity change and appetite change.   HENT:  Positive for hearing loss.    Eyes:  Negative for blurred vision and double vision.   Respiratory:  Negative for shortness of breath, wheezing and stridor.    Cardiovascular:  Negative for chest pain, palpitations and leg swelling.   Genitourinary:  Negative for decreased urine volume and urinary incontinence.   Musculoskeletal:  Negative for arthralgias and back pain.   Psychiatric/Behavioral:  Positive for stress.        Past Medical History:   Past Medical History:   Diagnosis Date    A-fib     Atrial flutter     Bradycardia     Cataract     Constipation     Heartburn     Hyperlipidemia     Hypothyroid     Wears glasses        Past Surgical History:   Past Surgical History:   Procedure Laterality Date    ABLATION OF DYSRHYTHMIC FOCUS      CARDIAC ELECTROPHYSIOLOGY PROCEDURE N/A 7/15/2020    Procedure: AV node ablation- pt has SJM pacemaker;  Surgeon: Eric Almodovar DO;  Location: [facility] LOCATION;  Service: Cardiovascular;  Laterality: N/A;    CARDIOVERSION      CATARACT EXTRACTION Right     CATARACT EXTRACTION      COLONOSCOPY      INSERT / REPLACE / REMOVE PACEMAKER  08/29/2014    [facility] Location: [facility]    TUBAL ABDOMINAL LIGATION         Family History:   Family History   Problem Relation Age of Onset    Lung cancer Mother     Heart disease Brother        Social History:   Social History     Socioeconomic History    Marital status:    Tobacco Use    Smoking status: Never     Passive exposure: Never    Smokeless tobacco: Never   Vaping Use    Vaping status: Never Used   Substance and Sexual Activity    Alcohol use: No    Drug use: Never    Sexual activity: Defer       Immunizations:   Immunization History   Administered Date(s) Administered    Pneumococcal, Unspecified 09/15/2008        Medications:     Current Outpatient Medications:     ascorbic acid (VITAMIN  "C) 1000 MG tablet, Take 1 tablet by mouth Daily., Disp: , Rfl:     atorvastatin (LIPITOR) 20 MG tablet, TAKE ONE TABLET BY MOUTH ONCE NIGHTLY, Disp: 90 tablet, Rfl: 1    B Complex Vitamins (Vitamin B Complex) tablet, Take 1 tablet by mouth Daily., Disp: , Rfl:     BIOTIN PO, Take 5,000 mcg by mouth Daily., Disp: , Rfl:     Calcium-Magnesium-Vitamin D (CALCIUM 1200+D3 PO), Take 1 tablet by mouth Daily. 1000 units d3, Disp: , Rfl:     Cholecalciferol (Vitamin D3) 50 MCG (2000 UT) capsule, Take 1 capsule by mouth Daily., Disp: , Rfl:     docusate sodium (COLACE) 100 MG capsule, Take 1 capsule by mouth As Needed., Disp: , Rfl:     Eliquis 5 MG tablet tablet, TAKE 1 TABLET BY MOUTH TWICE A DAY, Disp: 60 tablet, Rfl: 6    levothyroxine (SYNTHROID, LEVOTHROID) 88 MCG tablet, TAKE 1 TABLET BY MOUTH DAILY, Disp: 90 tablet, Rfl: 1    Magnesium 500 MG tablet, Take 1 tablet by mouth Daily., Disp: , Rfl:     melatonin 5 MG tablet tablet, Take 1 tablet by mouth At Night As Needed., Disp: , Rfl:     metFORMIN ER (GLUCOPHAGE-XR) 500 MG 24 hr tablet, Take 1 tablet by mouth Daily With Breakfast., Disp: 90 tablet, Rfl: 1    Multiple Vitamins-Minerals (MULTIVITAMIN ADULT PO), Take 1 tablet by mouth Daily., Disp: , Rfl:     nitroglycerin (NITROSTAT) 0.4 MG SL tablet, 1 under the tongue as needed for angina, may repeat q5mins for up three doses, Disp: 100 tablet, Rfl: 11    Omega-3 Fatty Acids (fish oil) 1200 MG capsule capsule, Take 1 capsule by mouth Daily., Disp: , Rfl:     Allergies:   No Known Allergies    Objective     Vital Signs  /78 (BP Location: Left arm, Patient Position: Sitting, Cuff Size: Adult)   Pulse 70   Temp 97.7 °F (36.5 °C) (Temporal)   Ht 157.5 cm (62.01\")   Wt 61.4 kg (135 lb 6.4 oz)   SpO2 98%   BMI 24.76 kg/m²   Body mass index is 24.76 kg/m².     BMI is within normal parameters. No other follow-up for BMI required.       Physical Exam  Vitals reviewed.   Constitutional:       Appearance: Normal " appearance.   HENT:      Head: Normocephalic and atraumatic.      Right Ear: Tympanic membrane normal.      Left Ear: Tympanic membrane normal.      Nose: Nose normal.      Mouth/Throat:      Mouth: Mucous membranes are moist.      Pharynx: Oropharynx is clear.   Cardiovascular:      Rate and Rhythm: Normal rate and regular rhythm.      Pulses: Normal pulses.      Heart sounds: Normal heart sounds.   Pulmonary:      Effort: Pulmonary effort is normal.      Breath sounds: Normal breath sounds. No wheezing.   Abdominal:      General: Abdomen is flat. Bowel sounds are normal.   Skin:     General: Skin is warm and dry.   Neurological:      General: No focal deficit present.      Mental Status: She is alert and oriented to person, place, and time.   Psychiatric:         Cognition and Memory: She exhibits impaired recent memory.          Procedures     Results:  Recent Results (from the past 24 hours)   POC Glycosylated Hemoglobin (Hb A1C)    Collection Time: 01/30/25  9:47 AM    Specimen: Blood   Result Value Ref Range    Hemoglobin A1C 6.7 (A) 4.5 - 5.7 %    Lot Number [id number]     Expiration Date 10/11/2026         Assessment and Plan     Assessment/Plan:  Diagnoses and all orders for this visit:    1. Type 2 diabetes mellitus without complication, unspecified whether long term insulin use (Primary)  -     POC Glycosylated Hemoglobin (Hb A1C)  -     Comprehensive Metabolic Panel; Future  -     Hemoglobin A1c; Future  -     Vitamin B12; Future    2. Other specified hearing loss of left ear, unspecified hearing status on contralateral side  -     Ambulatory Referral to Audiology    3. Acquired hypothyroidism  -     TSH; Future  -     T4, Free; Future  -     T3, Free; Future    4. Essential hypertension  -     CBC & Differential; Future  -     Comprehensive Metabolic Panel; Future  -     MicroAlbumin, Urine, Random - Urine, Clean Catch; Future    5. Mixed hyperlipidemia  -     Lipid Panel; Future    6. Memory  impairment    7. Complete heart block  Overview:  [employer] dual-chamber permanent pacemaker, 8/29/2014  VVIR for permanent A-fib      8. Cardiac pacemaker in situ        Assessment & Plan  1. Diabetes Mellitus.  Her A1c level has shown a slight increase, currently at 6.7%, up from 6% during the last visit. Despite this, it remains within the acceptable range of under 7%. Her weight has also decreased slightly. She is advised to continue monitoring her blood sugar levels and maintain a balanced diet. Increasing physical activity, such as walking more, is recommended to help manage her blood sugar levels.    2. Memory Impairment.  A minor decline in memory function has been observed. She was able to correctly identify the current year, month, day of the week, and date. She was also able to recall a name and address after repetition, name several animals, and draw a clock showing a specific time. She is advised to continue engaging in activities that stimulate her brain, such as puzzles and word games. A re-evaluation of her memory function will be conducted during her next physical examination.    3. Left Ear Hearing Loss.  She reports a change in hearing in her left ear. A hearing test for the left ear will be ordered to ensure there are no underlying issues that need to be addressed.    4. Complete heart block.  She is due for a pacemaker battery replacement, which is scheduled for March. She has a monitor at home that records her heart activity and sends it to her cardiologist. She has not received any communication from her cardiologist regarding any abnormalities.       Follow Up  Return in about 3 months (around 4/30/2025).    Patient or patient representative verbalized consent for the use of Ambient Listening during the visit with  Zamzam Amador PA-C for chart documentation. 1/30/2025  12:33 EST      Zamzam Amador PA-C   [facility] [place] [id number]

## 2025-01-31 LAB
ALBUMIN UR-MCNC: <1.2 MG/DL
HBA1C MFR BLD: 6.7 % (ref 4.8–5.6)

## 2025-02-23 PROCEDURE — 87086 URINE CULTURE/COLONY COUNT: CPT

## 2025-02-23 PROCEDURE — 87077 CULTURE AEROBIC IDENTIFY: CPT

## 2025-02-23 PROCEDURE — 87186 SC STD MICRODIL/AGAR DIL: CPT

## 2025-02-24 ENCOUNTER — PATIENT ROUNDING (BHMG ONLY) (OUTPATIENT)
Dept: URGENT CARE | Facility: CLINIC | Age: 89
End: 2025-02-24
Payer: MEDICARE

## 2025-05-01 ENCOUNTER — OFFICE VISIT (OUTPATIENT)
Dept: INTERNAL MEDICINE | Facility: CLINIC | Age: 89
End: 2025-05-01
Payer: MEDICARE

## 2025-05-01 ENCOUNTER — LAB (OUTPATIENT)
Dept: LAB | Facility: HOSPITAL | Age: 89
End: 2025-05-01
Payer: MEDICARE

## 2025-05-01 VITALS
HEIGHT: 62 IN | BODY MASS INDEX: 24.62 KG/M2 | WEIGHT: 133.8 LBS | TEMPERATURE: 98.4 F | SYSTOLIC BLOOD PRESSURE: 130 MMHG | HEART RATE: 83 BPM | OXYGEN SATURATION: 97 % | DIASTOLIC BLOOD PRESSURE: 90 MMHG

## 2025-05-01 DIAGNOSIS — Z13.21 ENCOUNTER FOR VITAMIN DEFICIENCY SCREENING: ICD-10-CM

## 2025-05-01 DIAGNOSIS — E11.9 TYPE 2 DIABETES MELLITUS WITHOUT COMPLICATION, UNSPECIFIED WHETHER LONG TERM INSULIN USE: ICD-10-CM

## 2025-05-01 DIAGNOSIS — E11.9 TYPE 2 DIABETES MELLITUS WITHOUT COMPLICATION, UNSPECIFIED WHETHER LONG TERM INSULIN USE: Primary | ICD-10-CM

## 2025-05-01 DIAGNOSIS — Z87.440 HISTORY OF UTI: ICD-10-CM

## 2025-05-01 DIAGNOSIS — E55.9 VITAMIN D DEFICIENCY: ICD-10-CM

## 2025-05-01 DIAGNOSIS — E78.2 MIXED HYPERLIPIDEMIA: ICD-10-CM

## 2025-05-01 DIAGNOSIS — E03.9 ACQUIRED HYPOTHYROIDISM: ICD-10-CM

## 2025-05-01 DIAGNOSIS — I10 ESSENTIAL HYPERTENSION: ICD-10-CM

## 2025-05-01 DIAGNOSIS — R41.3 MEMORY IMPAIRMENT: ICD-10-CM

## 2025-05-01 LAB
25(OH)D3 SERPL-MCNC: 56.6 NG/ML (ref 30–100)
ALBUMIN SERPL-MCNC: 4.7 G/DL (ref 3.5–5.2)
ALBUMIN/GLOB SERPL: 1.6 G/DL
ALP SERPL-CCNC: 111 U/L (ref 39–117)
ALT SERPL W P-5'-P-CCNC: 22 U/L (ref 1–33)
ANION GAP SERPL CALCULATED.3IONS-SCNC: 11.3 MMOL/L (ref 5–15)
AST SERPL-CCNC: 28 U/L (ref 1–32)
BACTERIA UR QL AUTO: ABNORMAL /HPF
BASOPHILS # BLD AUTO: 0.02 10*3/MM3 (ref 0–0.2)
BASOPHILS NFR BLD AUTO: 0.3 % (ref 0–1.5)
BILIRUB SERPL-MCNC: 1.7 MG/DL (ref 0–1.2)
BUN SERPL-MCNC: 16 MG/DL (ref 8–23)
BUN/CREAT SERPL: 18.4 (ref 7–25)
CALCIUM SPEC-SCNC: 10.5 MG/DL (ref 8.6–10.5)
CHLORIDE SERPL-SCNC: 105 MMOL/L (ref 98–107)
CHOLEST SERPL-MCNC: 162 MG/DL (ref 0–200)
CO2 SERPL-SCNC: 26.7 MMOL/L (ref 22–29)
CREAT SERPL-MCNC: 0.87 MG/DL (ref 0.57–1)
DEPRECATED RDW RBC AUTO: 41.3 FL (ref 37–54)
EGFRCR SERPLBLD CKD-EPI 2021: 64.2 ML/MIN/1.73
EOSINOPHIL # BLD AUTO: 0.06 10*3/MM3 (ref 0–0.4)
EOSINOPHIL NFR BLD AUTO: 1 % (ref 0.3–6.2)
ERYTHROCYTE [DISTWIDTH] IN BLOOD BY AUTOMATED COUNT: 11.9 % (ref 12.3–15.4)
GLOBULIN UR ELPH-MCNC: 3 GM/DL
GLUCOSE SERPL-MCNC: 117 MG/DL (ref 65–99)
HBA1C MFR BLD: 6.7 % (ref 4.8–5.6)
HCT VFR BLD AUTO: 42.9 % (ref 34–46.6)
HDLC SERPL-MCNC: 63 MG/DL (ref 40–60)
HGB BLD-MCNC: 14.6 G/DL (ref 12–15.9)
HYALINE CASTS UR QL AUTO: ABNORMAL /LPF
IMM GRANULOCYTES # BLD AUTO: 0.02 10*3/MM3 (ref 0–0.05)
IMM GRANULOCYTES NFR BLD AUTO: 0.3 % (ref 0–0.5)
LDLC SERPL CALC-MCNC: 80 MG/DL (ref 0–100)
LDLC/HDLC SERPL: 1.23 {RATIO}
LYMPHOCYTES # BLD AUTO: 1.92 10*3/MM3 (ref 0.7–3.1)
LYMPHOCYTES NFR BLD AUTO: 30.9 % (ref 19.6–45.3)
MCH RBC QN AUTO: 32.2 PG (ref 26.6–33)
MCHC RBC AUTO-ENTMCNC: 34 G/DL (ref 31.5–35.7)
MCV RBC AUTO: 94.5 FL (ref 79–97)
MONOCYTES # BLD AUTO: 0.55 10*3/MM3 (ref 0.1–0.9)
MONOCYTES NFR BLD AUTO: 8.9 % (ref 5–12)
NEUTROPHILS NFR BLD AUTO: 3.64 10*3/MM3 (ref 1.7–7)
NEUTROPHILS NFR BLD AUTO: 58.6 % (ref 42.7–76)
NRBC BLD AUTO-RTO: 0 /100 WBC (ref 0–0.2)
PLATELET # BLD AUTO: 227 10*3/MM3 (ref 140–450)
PMV BLD AUTO: 10.1 FL (ref 6–12)
POTASSIUM SERPL-SCNC: 4.4 MMOL/L (ref 3.5–5.2)
PROT SERPL-MCNC: 7.7 G/DL (ref 6–8.5)
RBC # BLD AUTO: 4.54 10*6/MM3 (ref 3.77–5.28)
RBC # UR STRIP: ABNORMAL /HPF
REF LAB TEST METHOD: ABNORMAL
SODIUM SERPL-SCNC: 143 MMOL/L (ref 136–145)
SQUAMOUS #/AREA URNS HPF: ABNORMAL /HPF
T3FREE SERPL-MCNC: 3.24 PG/ML (ref 2–4.4)
T4 FREE SERPL-MCNC: 1.51 NG/DL (ref 0.92–1.68)
TRIGL SERPL-MCNC: 108 MG/DL (ref 0–150)
TSH SERPL DL<=0.05 MIU/L-ACNC: 0.61 UIU/ML (ref 0.27–4.2)
VIT B12 BLD-MCNC: 661 PG/ML (ref 211–946)
VLDLC SERPL-MCNC: 19 MG/DL (ref 5–40)
WBC # UR STRIP: ABNORMAL /HPF
WBC NRBC COR # BLD AUTO: 6.21 10*3/MM3 (ref 3.4–10.8)

## 2025-05-01 PROCEDURE — 82306 VITAMIN D 25 HYDROXY: CPT

## 2025-05-01 PROCEDURE — 84439 ASSAY OF FREE THYROXINE: CPT

## 2025-05-01 PROCEDURE — 84443 ASSAY THYROID STIM HORMONE: CPT

## 2025-05-01 PROCEDURE — 83036 HEMOGLOBIN GLYCOSYLATED A1C: CPT

## 2025-05-01 PROCEDURE — 80053 COMPREHEN METABOLIC PANEL: CPT

## 2025-05-01 PROCEDURE — 80061 LIPID PANEL: CPT

## 2025-05-01 PROCEDURE — 82570 ASSAY OF URINE CREATININE: CPT

## 2025-05-01 PROCEDURE — 81015 MICROSCOPIC EXAM OF URINE: CPT

## 2025-05-01 PROCEDURE — 84481 FREE ASSAY (FT-3): CPT

## 2025-05-01 PROCEDURE — 82607 VITAMIN B-12: CPT

## 2025-05-01 PROCEDURE — 82043 UR ALBUMIN QUANTITATIVE: CPT

## 2025-05-01 PROCEDURE — 85025 COMPLETE CBC W/AUTO DIFF WBC: CPT

## 2025-05-01 NOTE — PROGRESS NOTES
Office Note     Name: Carli Carver    : 1935     MRN: [id number]     Chief Complaint  Diabetes    Subjective     History of Present Illness:  Carli Carver is a 89 y.o. female who presents today for follow up.  Past medical history significant for type 2 diabetes, atrial fibrillation with pacemaker, hyperlipidemia, hypothyroidism.    History of Present Illness  The patient presents for evaluation of pacemaker battery replacement, off-balance sensation, UTI, and emotional distress. She is accompanied by her daughter.    A consultation with  occurred 2 to 3 weeks ago, during which the pacemaker battery was found to be nearing depletion. A follow-up appointment is scheduled post her daughter's wedding in 2025, at which time the battery replacement procedure will be arranged.    Overall good health is reported, with no new onset of fatigue, shortness of breath, or palpitations. Sleep patterns remain undisturbed. Occasional balance disturbances are noted, resulting in minor collisions with walls, but no associated dizziness, lightheadedness, weakness, or instability in the legs. No falls have been experienced.    Blood glucose levels have not been monitored due to lack of equipment. Medication adherence is maintained using a pill box for organization. Physical activity includes walking for approximately 30 minutes daily, either outdoors or in a mall setting. No changes in bowel movements or urinary frequency are reported.    A urinary tract infection (UTI) occurred in 2025, initially treated with antibiotics. Symptoms reappeared after a week, necessitating a change in antibiotic therapy. The second course of antibiotics appears to have resolved the infection. Water intake is admitted to be inadequate, with consumption of 24 to 30 ounces twice daily.    Emotional distress is reported following the loss of her  and ongoing family issues. Tearfulness and feelings of being  overwhelmed are noted. Her daughter has requested a vitamin D level check due to concerns about potential deficiency contributing to anxiety or depression.    PAST SURGICAL HISTORY:  - Tubal ligation  - Partial hysterectomy    Review of Systems:   Review of Systems   Constitutional:  Negative for activity change, appetite change, diaphoresis, fatigue, unexpected weight gain and unexpected weight loss.   HENT:  Negative for hearing loss.    Eyes:  Negative for visual disturbance.   Respiratory:  Negative for chest tightness and shortness of breath.    Cardiovascular:  Negative for chest pain, palpitations and leg swelling.   Gastrointestinal:  Negative for abdominal pain, blood in stool, GERD and indigestion.   Endocrine: Negative for cold intolerance and heat intolerance.   Genitourinary:  Negative for dysuria and hematuria.   Musculoskeletal:  Negative for arthralgias and myalgias.   Skin:  Negative for skin lesions.   Neurological:  Negative for dizziness, tremors, seizures, syncope, speech difficulty, weakness, headache, memory problem and confusion.        Off balance   Hematological:  Does not bruise/bleed easily.   Psychiatric/Behavioral:  Negative for sleep disturbance and depressed mood. The patient is not nervous/anxious.        Past Medical History:   Past Medical History:   Diagnosis Date    A-fib     Atrial flutter     Bradycardia     Cataract     Constipation     Heartburn     Hyperlipidemia     Hypothyroid     Wears glasses        Past Surgical History:   Past Surgical History:   Procedure Laterality Date    ABLATION OF DYSRHYTHMIC FOCUS      CARDIAC ELECTROPHYSIOLOGY PROCEDURE N/A 7/15/2020    Procedure: AV node ablation- pt has SJM pacemaker;  Surgeon: Eric Almodovar DO;  Location: [facility] INVASIVE LOCATION;  Service: Cardiovascular;  Laterality: N/A;    CARDIOVERSION      CATARACT EXTRACTION Right     CATARACT EXTRACTION      COLONOSCOPY      INSERT / REPLACE / REMOVE PACEMAKER  08/29/2014     [facility] Location: [facility]    TUBAL ABDOMINAL LIGATION         Family History:   Family History   Problem Relation Age of Onset    Lung cancer Mother     Heart disease Brother        Social History:   Social History     Socioeconomic History    Marital status:    Tobacco Use    Smoking status: Never     Passive exposure: Never    Smokeless tobacco: Never   Vaping Use    Vaping status: Never Used   Substance and Sexual Activity    Alcohol use: No    Drug use: Never    Sexual activity: Defer       Immunizations:   Immunization History   Administered Date(s) Administered    Pneumococcal, Unspecified 09/15/2008        Medications:     Current Outpatient Medications:     ascorbic acid (VITAMIN C) 1000 MG tablet, Take 1 tablet by mouth Daily., Disp: , Rfl:     atorvastatin (LIPITOR) 20 MG tablet, TAKE ONE TABLET BY MOUTH ONCE NIGHTLY, Disp: 90 tablet, Rfl: 1    B Complex Vitamins (Vitamin B Complex) tablet, Take 1 tablet by mouth Daily., Disp: , Rfl:     BIOTIN PO, Take 5,000 mcg by mouth Daily., Disp: , Rfl:     Calcium-Magnesium-Vitamin D (CALCIUM 1200+D3 PO), Take 1 tablet by mouth Daily. 1000 units d3, Disp: , Rfl:     Cholecalciferol (Vitamin D3) 50 MCG (2000 UT) capsule, Take 1 capsule by mouth Daily., Disp: , Rfl:     docusate sodium (COLACE) 100 MG capsule, Take 1 capsule by mouth As Needed., Disp: , Rfl:     Eliquis 5 MG tablet tablet, TAKE 1 TABLET BY MOUTH TWICE A DAY, Disp: 60 tablet, Rfl: 6    levothyroxine (SYNTHROID, LEVOTHROID) 88 MCG tablet, TAKE 1 TABLET BY MOUTH DAILY, Disp: 90 tablet, Rfl: 1    Magnesium 500 MG tablet, Take 1 tablet by mouth Daily., Disp: , Rfl:     melatonin 5 MG tablet tablet, Take 1 tablet by mouth At Night As Needed., Disp: , Rfl:     metFORMIN ER (GLUCOPHAGE-XR) 500 MG 24 hr tablet, Take 1 tablet by mouth Daily With Breakfast., Disp: 90 tablet, Rfl: 1    Multiple Vitamins-Minerals (MULTIVITAMIN ADULT PO), Take 1 tablet by mouth Daily., Disp: , Rfl:      "nitroglycerin (NITROSTAT) 0.4 MG SL tablet, 1 under the tongue as needed for angina, may repeat q5mins for up three doses, Disp: 100 tablet, Rfl: 11    Omega-3 Fatty Acids (fish oil) 1200 MG capsule capsule, Take 1 capsule by mouth Daily., Disp: , Rfl:     Omega-3 Fatty Acids (Fish Oil) 300 MG capsule, , Disp: , Rfl:     Allergies:   No Known Allergies    Objective     Vital Signs  /90 (BP Location: Left arm, Patient Position: Sitting, Cuff Size: Adult)   Pulse 83   Temp 98.4 °F (36.9 °C) (Infrared)   Ht 157.5 cm (62\")   Wt 60.7 kg (133 lb 12.8 oz)   SpO2 97%   BMI 24.47 kg/m²   Body mass index is 24.47 kg/m².     BMI is within normal parameters. No other follow-up for BMI required.       Physical Exam  Vitals reviewed.   Constitutional:       Appearance: Normal appearance. She is well-developed.   HENT:      Head: Normocephalic and atraumatic.      Right Ear: Hearing, tympanic membrane, ear canal and external ear normal.      Left Ear: Hearing, tympanic membrane, ear canal and external ear normal.      Nose: Nose normal.      Mouth/Throat:      Pharynx: Uvula midline.   Eyes:      General: Lids are normal.      Conjunctiva/sclera: Conjunctivae normal.      Pupils: Pupils are equal, round, and reactive to light.   Cardiovascular:      Rate and Rhythm: Normal rate and regular rhythm.      Heart sounds: Normal heart sounds.   Pulmonary:      Effort: Pulmonary effort is normal.      Breath sounds: Normal breath sounds.   Abdominal:      General: Bowel sounds are normal.      Palpations: Abdomen is soft.   Musculoskeletal:         General: Normal range of motion.      Cervical back: Full passive range of motion without pain, normal range of motion and neck supple.   Skin:     General: Skin is warm and dry.   Neurological:      General: No focal deficit present.      Mental Status: She is alert and oriented to person, place, and time. Mental status is at baseline.      Deep Tendon Reflexes: Reflexes are " normal and symmetric.   Psychiatric:         Speech: Speech normal.         Behavior: Behavior normal.         Thought Content: Thought content normal.         Judgment: Judgment normal.          Procedures     Results:  No results found for this or any previous visit (from the past 24 hours).     Assessment and Plan     Assessment/Plan:  Diagnoses and all orders for this visit:    1. Type 2 diabetes mellitus without complication, unspecified whether long term insulin use (Primary)  -     CBC & Differential; Future  -     Comprehensive Metabolic Panel; Future  -     Hemoglobin A1c; Future  -     Microalbumin / Creatinine Urine Ratio - Urine, Clean Catch; Future    2. Acquired hypothyroidism  -     TSH; Future  -     T4, Free; Future  -     T3, Free; Future    3. Essential hypertension    4. Mixed hyperlipidemia  -     Lipid Panel; Future    5. Memory impairment    6. Vitamin D deficiency  -     Vitamin D,25-Hydroxy; Future    7. Encounter for vitamin deficiency screening  -     Vitamin B12; Future    8. History of UTI  -     Urinalysis, Microscopic Only - Urine, Clean Catch; Future        Assessment & Plan  1. Pacemaker battery replacement.  - The patient is nearing the end of her pacemaker battery life. A follow-up appointment is scheduled for 06/19/2025 to plan the battery replacement procedure.  - No new fatigue, shortness of breath, or palpitations reported.  - Physical exam reveals clear lung sounds and no pain upon palpation.  - Patient reassured about the gradual decrease in pacemaker function and the plan for timely replacement.    2. Off-balance sensation.  - Reports feeling off-balance but does not experience dizziness, lightheadedness, or weakness.  - Blood pressure recorded as 130/78.   - Labs will be checked today, including B12, vitamin D, and magnesium, to rule out any underlying causes.  - Continues to walk for 30 minutes daily; advised to monitor for any changes in balance.    3. Urinary tract  infection (UTI).  - Had a UTI in 02/2025, treated with antibiotics. Symptoms recurred briefly but resolved with a second antibiotic course.  - Urine test will be conducted today to ensure the infection has cleared.  - Advised to increase water intake to help prevent future infections, aiming for at least 48-60 ounces daily.    4. Depression.  - Experiencing symptoms of depression, including feeling weepy and overwhelmed.  - Vitamin D levels will be checked today to determine if a deficiency is contributing to her symptoms.  - Discussed the impact of recent family stressors and grief; counseling options may be considered based on lab results.    5. Medication management.  - Taking atorvastatin in the evenings, Eliquis twice a day, levothyroxine first thing in the morning, and metformin with breakfast.  - Uses a pill box to manage medications and supplements.  - No new medications needed at this time; all prescriptions are being taken correctly.    Follow-up  The patient will follow up in 3 months.      Follow Up  Return in about 3 months (around 8/1/2025).    Patient or patient representative verbalized consent for the use of Ambient Listening during the visit with  Zamzam Amador PA-C for chart documentation. 5/1/2025  12:39 EDT      Zamzam Amador PA-C   [facility] [address]

## 2025-05-02 LAB
ALBUMIN UR-MCNC: <1.2 MG/DL
CREAT UR-MCNC: 73 MG/DL
MICROALBUMIN/CREAT UR: NORMAL MG/G{CREAT}

## 2025-05-05 ENCOUNTER — RESULTS FOLLOW-UP (OUTPATIENT)
Dept: LAB | Facility: HOSPITAL | Age: 89
End: 2025-05-05
Payer: MEDICARE

## 2025-06-19 PROBLEM — R73.03 PREDIABETES: Status: ACTIVE | Noted: 2020-08-06

## 2025-06-19 PROBLEM — E78.2 MIXED HYPERLIPIDEMIA: Status: RESOLVED | Noted: 2022-05-27 | Resolved: 2025-06-19

## 2025-06-19 PROCEDURE — 87086 URINE CULTURE/COLONY COUNT: CPT | Performed by: NURSE PRACTITIONER

## 2025-06-20 ENCOUNTER — PATIENT ROUNDING (BHMG ONLY) (OUTPATIENT)
Dept: URGENT CARE | Facility: CLINIC | Age: 89
End: 2025-06-20
Payer: MEDICARE

## 2025-06-20 ENCOUNTER — RESULTS FOLLOW-UP (OUTPATIENT)
Dept: URGENT CARE | Facility: CLINIC | Age: 89
End: 2025-06-20
Payer: MEDICARE

## 2025-06-23 DIAGNOSIS — E11.9 TYPE 2 DIABETES MELLITUS WITHOUT COMPLICATION, UNSPECIFIED WHETHER LONG TERM INSULIN USE: ICD-10-CM

## 2025-06-23 RX ORDER — METFORMIN HYDROCHLORIDE 500 MG/1
500 TABLET, EXTENDED RELEASE ORAL
Qty: 90 TABLET | Refills: 1 | Status: SHIPPED | OUTPATIENT
Start: 2025-06-23

## 2025-06-26 ENCOUNTER — OFFICE VISIT (OUTPATIENT)
Dept: INTERNAL MEDICINE | Facility: CLINIC | Age: 89
End: 2025-06-26
Payer: MEDICARE

## 2025-06-26 ENCOUNTER — TELEPHONE (OUTPATIENT)
Dept: INTERNAL MEDICINE | Facility: CLINIC | Age: 89
End: 2025-06-26

## 2025-06-26 VITALS
OXYGEN SATURATION: 96 % | DIASTOLIC BLOOD PRESSURE: 80 MMHG | WEIGHT: 134 LBS | HEART RATE: 64 BPM | BODY MASS INDEX: 24.51 KG/M2 | SYSTOLIC BLOOD PRESSURE: 133 MMHG

## 2025-06-26 DIAGNOSIS — I10 ESSENTIAL HYPERTENSION: ICD-10-CM

## 2025-06-26 DIAGNOSIS — E11.9 TYPE 2 DIABETES MELLITUS WITHOUT COMPLICATION, UNSPECIFIED WHETHER LONG TERM INSULIN USE: ICD-10-CM

## 2025-06-26 DIAGNOSIS — I48.21 PERMANENT ATRIAL FIBRILLATION: ICD-10-CM

## 2025-06-26 DIAGNOSIS — N30.00 ACUTE CYSTITIS WITHOUT HEMATURIA: Primary | ICD-10-CM

## 2025-06-26 LAB
BILIRUB BLD-MCNC: NEGATIVE MG/DL
CLARITY, POC: CLEAR
COLOR UR: YELLOW
EXPIRATION DATE: NORMAL
GLUCOSE UR STRIP-MCNC: NEGATIVE MG/DL
KETONES UR QL: NEGATIVE
LEUKOCYTE EST, POC: NEGATIVE
Lab: NORMAL
NITRITE UR-MCNC: NEGATIVE MG/ML
PH UR: 6.5 [PH] (ref 5–8)
PROT UR STRIP-MCNC: NEGATIVE MG/DL
RBC # UR STRIP: NEGATIVE /UL
SP GR UR: 1.02 (ref 1–1.03)
UROBILINOGEN UR QL: NORMAL

## 2025-06-26 RX ORDER — BLOOD-GLUCOSE METER
1 KIT MISCELLANEOUS DAILY
Qty: 1 EACH | Refills: 1 | Status: SHIPPED | OUTPATIENT
Start: 2025-06-26

## 2025-06-26 NOTE — PROGRESS NOTES
Office Note     Name: Carli Carver    : 1935     MRN: [id number]     Chief Complaint  Blood in Urine (3rd uti in 2 months )    Subjective     History of Present Illness:  Carli Carver is a 89 y.o. female who presents today for hematuria.  Past medical history significant for recurrent UTI, hypertension, chronic anticoagulation, hypothyroidism, type 2 diabetes.    History of Present Illness  The patient presents for evaluation of urinary tract infection. She is accompanied by her daughter.    The patient's daughter reports that her mother has been experiencing symptoms consistent with a urinary tract infection (UTI), similar to previous episodes. However, a recent urine culture returned negative results. Despite this, she exhibited hematuria and elevated leukocytes in her urine. The first culture conducted in 2025 identified E. coli. A subsequent culture on 2025 showed no red blood cells but did show white blood cells without bacteria. The culture on 2025 was negative, but there were red blood cells and a moderate amount of leukocytes present. She was prescribed antibiotics during her last visit to the urgent treatment center. The daughter expresses concern about the potential impact of three consecutive rounds of antibiotics on her mother's health.     She does not report any pelvic pressure, increased frequency of urination, body aches, fevers, or low back pain. She also does not report any history of kidney stones, blood in her underwear, or vaginal discharge. She has noticed an orange discoloration in her stools over the past week. The patient has been taking Florastor once daily as prescribed by the nurse practitioner.    The daughter queries whether her mother qualifies for a glucometer to monitor her blood sugar levels. She maintains a high water intake, consuming approximately 32 ounces daily.    Review of Systems:   Review of Systems   Constitutional:  Negative for  activity change, appetite change, diaphoresis, fatigue, unexpected weight gain and unexpected weight loss.   HENT:  Negative for hearing loss.    Eyes:  Negative for visual disturbance.   Respiratory:  Negative for chest tightness and shortness of breath.    Cardiovascular:  Negative for chest pain, palpitations and leg swelling.   Gastrointestinal:  Negative for abdominal pain, blood in stool, GERD and indigestion.   Endocrine: Negative for cold intolerance and heat intolerance.   Genitourinary:  Positive for frequency and hematuria. Negative for dysuria.   Musculoskeletal:  Negative for arthralgias and myalgias.   Skin:  Negative for skin lesions.   Neurological:  Negative for tremors, seizures, syncope, speech difficulty, weakness, headache, memory problem and confusion.   Hematological:  Does not bruise/bleed easily.   Psychiatric/Behavioral:  Negative for sleep disturbance and depressed mood. The patient is not nervous/anxious.        Past Medical History:   Past Medical History:   Diagnosis Date    A-fib     Atrial flutter     Bradycardia     Cataract     Constipation     Heartburn     Hyperlipidemia     Hypothyroid     Wears glasses        Past Surgical History:   Past Surgical History:   Procedure Laterality Date    ABLATION OF DYSRHYTHMIC FOCUS      CARDIAC ELECTROPHYSIOLOGY PROCEDURE N/A 7/15/2020    Procedure: AV node ablation- pt has SJM pacemaker;  Surgeon: Eric Almodovar DO;  [facility];  Service: Cardiovascular;  Laterality: N/A;    CARDIOVERSION      CATARACT EXTRACTION Right     CATARACT EXTRACTION      COLONOSCOPY      INSERT / REPLACE / REMOVE PACEMAKER  08/29/2014    [facility] Location: [facility]    TUBAL ABDOMINAL LIGATION         Family History:   Family History   Problem Relation Age of Onset    Lung cancer Mother     Heart disease Brother        Social History:   Social History     Socioeconomic History    Marital status:    Tobacco Use    Smoking  status: Never     Passive exposure: Never    Smokeless tobacco: Never   Vaping Use    Vaping status: Never Used   Substance and Sexual Activity    Alcohol use: No    Drug use: Never    Sexual activity: Defer       Immunizations:   Immunization History   Administered Date(s) Administered    Pneumococcal, Unspecified 09/15/2008        Medications:     Current Outpatient Medications:     ascorbic acid (VITAMIN C) 1000 MG tablet, Take 1 tablet by mouth Daily., Disp: , Rfl:     atorvastatin (LIPITOR) 20 MG tablet, TAKE ONE TABLET BY MOUTH ONCE NIGHTLY, Disp: 90 tablet, Rfl: 1    B Complex Vitamins (Vitamin B Complex) tablet, Take 1 tablet by mouth Daily., Disp: , Rfl:     BIOTIN PO, Take 5,000 mcg by mouth Daily., Disp: , Rfl:     Calcium-Magnesium-Vitamin D (CALCIUM 1200+D3 PO), Take 1 tablet by mouth Daily. 1000 units d3, Disp: , Rfl:     Cholecalciferol (Vitamin D3) 50 MCG (2000 UT) capsule, Take 1 capsule by mouth Daily., Disp: , Rfl:     docusate sodium (COLACE) 100 MG capsule, Take 1 capsule by mouth As Needed., Disp: , Rfl:     Eliquis 5 MG tablet tablet, TAKE 1 TABLET BY MOUTH TWICE A DAY, Disp: 60 tablet, Rfl: 6    levothyroxine (SYNTHROID, LEVOTHROID) 88 MCG tablet, TAKE 1 TABLET BY MOUTH DAILY, Disp: 90 tablet, Rfl: 1    Magnesium 500 MG tablet, Take 1 tablet by mouth Daily., Disp: , Rfl:     melatonin 5 MG tablet tablet, Take 1 tablet by mouth At Night As Needed., Disp: , Rfl:     metFORMIN ER (GLUCOPHAGE-XR) 500 MG 24 hr tablet, TAKE 1 TABLET BY MOUTH DAILY WITH BREAKFAST, Disp: 90 tablet, Rfl: 1    Multiple Vitamins-Minerals (MULTIVITAMIN ADULT PO), Take 1 tablet by mouth Daily., Disp: , Rfl:     nitroglycerin (NITROSTAT) 0.4 MG SL tablet, 1 under the tongue as needed for angina, may repeat q5mins for up three doses, Disp: 100 tablet, Rfl: 11    Omega-3 Fatty Acids (fish oil) 1200 MG capsule capsule, Take 1 capsule by mouth Daily., Disp: , Rfl:     Omega-3 Fatty Acids (Fish Oil) 300 MG capsule, , Disp: ,  Rfl:     saccharomyces boulardii (FLORASTOR) 250 MG capsule, Take 1 capsule by mouth 2 (Two) Times a Day., Disp: 60 capsule, Rfl: 0    glucose monitor monitoring kit, Use 1 each Daily., Disp: 1 each, Rfl: 1    Allergies:   No Known Allergies    Objective     Vital Signs  /80   Pulse 64   Wt 60.8 kg (134 lb)   SpO2 96%   BMI 24.51 kg/m²   Body mass index is 24.51 kg/m².     BMI is within normal parameters. No other follow-up for BMI required.       Physical Exam  Vitals reviewed.   Constitutional:       Appearance: Normal appearance. She is well-developed.   HENT:      Head: Normocephalic and atraumatic.      Right Ear: Hearing, tympanic membrane, ear canal and external ear normal.      Left Ear: Hearing, tympanic membrane, ear canal and external ear normal.      Nose: Nose normal.      Mouth/Throat:      Pharynx: Uvula midline.   Eyes:      General: Lids are normal.      Conjunctiva/sclera: Conjunctivae normal.      Pupils: Pupils are equal, round, and reactive to light.   Cardiovascular:      Rate and Rhythm: Normal rate and regular rhythm.      Heart sounds: Normal heart sounds.   Pulmonary:      Effort: Pulmonary effort is normal.      Breath sounds: Normal breath sounds.   Abdominal:      General: Bowel sounds are normal.      Palpations: Abdomen is soft.   Musculoskeletal:         General: Normal range of motion.      Cervical back: Full passive range of motion without pain, normal range of motion and neck supple.   Skin:     General: Skin is warm and dry.   Neurological:      General: No focal deficit present.      Mental Status: She is alert and oriented to person, place, and time. Mental status is at baseline.      Deep Tendon Reflexes: Reflexes are normal and symmetric.   Psychiatric:         Speech: Speech normal.         Behavior: Behavior normal.         Thought Content: Thought content normal.         Judgment: Judgment normal.        Procedures     Results:  Recent Results (from the past 24  hours)   POC Urinalysis Dipstick, Automated    Collection Time: 06/26/25  1:15 PM    Specimen: Urine   Result Value Ref Range    Color Yellow Yellow, Straw, Dark Yellow, [place]    Clarity, UA Clear Clear    Specific Gravity  1.020 1.005 - 1.030    pH, Urine 6.5 5.0 - 8.0    Leukocytes Negative Negative    Nitrite, UA Negative Negative    Protein, POC Negative Negative mg/dL    Glucose, UA Negative Negative mg/dL    Ketones, UA Negative Negative    Urobilinogen, UA 0.2 E.U./dL Normal, 0.2 E.U./dL    Bilirubin Negative Negative    Blood, UA Negative Negative    Lot Number [id number]     Expiration Date 22,826         Assessment and Plan     Assessment/Plan:  Diagnoses and all orders for this visit:    1. Acute cystitis without hematuria (Primary)  -     POC Urinalysis Dipstick, Automated    2. Type 2 diabetes mellitus without complication, unspecified whether long term insulin use  -     glucose monitor monitoring kit; Use 1 each Daily.  Dispense: 1 each; Refill: 1    3. Essential hypertension    4. Permanent atrial fibrillation  Overview:  AV node catheter ablation, 7/15/2020  Continue Eliquis 5 mg twice daily.          Assessment & Plan  1. Urinary Tract Infection (UTI).  - Recurrent UTIs with symptoms including blood in urine and elevated leukocytes.  - Previous cultures were negative, but E. coli was identified in February 2025.  - A urinalysis will be conducted today to check for any abnormalities.  Urinalysis today unremarkable.  For now, we will continue to monitor symptoms.  She is encouraged to increase water   intake throughout the day.  If symptoms continue, will refer to urology.  - Advised to increase water intake and take Florastor once daily instead of twice daily as previously prescribed.    2. Diabetes Mellitus.  - Blood sugar levels have been elevated, with an A1c of 6.7 for the past four months. This may contribute to recurrent UTIs.  - Advised to reduce sugar and processed food intake and maintain  adequate hydration.  - A glucometer will be provided to monitor blood sugar levels throughout the day. If blood sugar levels remain high, tighter control measures will be considered.    Follow-up  The patient will follow up in August 2025.   Follow Up  Return if symptoms worsen or fail to improve.    Patient or patient representative verbalized consent for the use of Ambient Listening during the visit with  Zamzam Amador PA-C for chart documentation. 6/27/2025  11:19 EDT      Zamzam Amador PA-C   [facility] [place] [facility] [place] [id number]

## 2025-06-26 NOTE — TELEPHONE ENCOUNTER
Pharmacy Name: [facility] [id number] - [facility] [address] - [phone]  - [phone]          Pharmacy representative phone number: [phone]     What medication are you calling in regards to: GLUCOSE MONITORING KIT    What question does the pharmacy have: PHARMACY NEEDS TO HAVE SEPARATE PRESCRIPTION FOR KIT,TEST STRIPS AND LANCETS AND NEEDS TO KNOW HOW MANY TIMES A DAY PATIENT IS TESTING    Who is the provider that prescribed the medication: RONALD EDGAR    Additional notes:

## 2025-06-27 DIAGNOSIS — E11.9 TYPE 2 DIABETES MELLITUS WITHOUT COMPLICATION, UNSPECIFIED WHETHER LONG TERM INSULIN USE: ICD-10-CM

## 2025-06-27 NOTE — PATIENT INSTRUCTIONS

## 2025-07-07 ENCOUNTER — TELEPHONE (OUTPATIENT)
Dept: INTERNAL MEDICINE | Facility: CLINIC | Age: 89
End: 2025-07-07
Payer: MEDICARE

## 2025-07-07 RX ORDER — ATORVASTATIN CALCIUM 20 MG/1
20 TABLET, FILM COATED ORAL NIGHTLY
Qty: 90 TABLET | Refills: 1 | Status: SHIPPED | OUTPATIENT
Start: 2025-07-07

## 2025-07-07 RX ORDER — LEVOTHYROXINE SODIUM 88 UG/1
88 TABLET ORAL DAILY
Qty: 90 TABLET | Refills: 1 | Status: SHIPPED | OUTPATIENT
Start: 2025-07-07

## 2025-07-07 RX ORDER — BLOOD-GLUCOSE METER
KIT MISCELLANEOUS
Qty: 1 EACH | Refills: 1 | Status: SHIPPED | OUTPATIENT
Start: 2025-07-07

## 2025-07-07 RX ORDER — LANCETS
EACH MISCELLANEOUS
Qty: 100 EACH | Refills: 1 | Status: SHIPPED | OUTPATIENT
Start: 2025-07-07

## 2025-07-07 NOTE — TELEPHONE ENCOUNTER
[id number] - [facility] [address] - [phone]  - [phone]      Pharmacy representative phone number: [phone]     What medication are you calling in regards to: GLUCOSE MONITOR TEST KIT     What question does the pharmacy have: THE PHARMACY SAYS THAT THEY CANNOT FILL THIS AS A KIT. THEY SAY THAT THEY NEED TO HAVE THE MONITOR AND THE STRIPS AND THE LANCETS AS SEPARATE PRESCRIPTIONS. THEY ARE ASKING TO HAVE THIS BE SENT OVER WITH THE DIAGNOSIS CODE AND THE AMOUNT OF TIMES THE PATIENT IS TO TEST PER DAY. THEY SAY THAT THIS IS REQUIRED WITH MEDICARE PART B     Who is the provider that prescribed the medication: RONALD EDGAR     Additional notes:

## 2025-07-09 DIAGNOSIS — Z45.018 ELECTIVE REPLACEMENT INDICATED FOR PACEMAKER: Primary | ICD-10-CM

## 2025-07-09 NOTE — PROGRESS NOTES
[facility] records received on pt for ppm gen chg.  She is less than 3 months from [facility] and is dependent (AVN RFA).  Last seen 2023 in EP clinic.  Will go ahead and proceed with gen chg.

## 2025-07-15 DIAGNOSIS — I49.5 SSS (SICK SINUS SYNDROME): Primary | ICD-10-CM

## 2025-07-15 RX ORDER — SODIUM CHLORIDE 0.9 % (FLUSH) 0.9 %
10 SYRINGE (ML) INJECTION AS NEEDED
OUTPATIENT
Start: 2025-07-15

## 2025-07-15 RX ORDER — NITROGLYCERIN 0.4 MG/1
0.4 TABLET SUBLINGUAL
OUTPATIENT
Start: 2025-07-15

## 2025-07-15 RX ORDER — ONDANSETRON 2 MG/ML
4 INJECTION INTRAMUSCULAR; INTRAVENOUS EVERY 6 HOURS PRN
OUTPATIENT
Start: 2025-07-15

## 2025-07-15 RX ORDER — SODIUM CHLORIDE 9 MG/ML
40 INJECTION, SOLUTION INTRAVENOUS AS NEEDED
OUTPATIENT
Start: 2025-07-15

## 2025-07-15 RX ORDER — ACETAMINOPHEN 325 MG/1
650 TABLET ORAL EVERY 4 HOURS PRN
OUTPATIENT
Start: 2025-07-15

## 2025-07-15 RX ORDER — SODIUM CHLORIDE 0.9 % (FLUSH) 0.9 %
3 SYRINGE (ML) INJECTION EVERY 12 HOURS SCHEDULED
OUTPATIENT
Start: 2025-07-15

## 2025-08-04 ENCOUNTER — LAB (OUTPATIENT)
Dept: LAB | Facility: HOSPITAL | Age: 89
End: 2025-08-04
Payer: MEDICARE

## 2025-08-04 ENCOUNTER — OFFICE VISIT (OUTPATIENT)
Dept: INTERNAL MEDICINE | Facility: CLINIC | Age: 89
End: 2025-08-04
Payer: MEDICARE

## 2025-08-04 VITALS
DIASTOLIC BLOOD PRESSURE: 78 MMHG | TEMPERATURE: 98.4 F | SYSTOLIC BLOOD PRESSURE: 140 MMHG | HEART RATE: 60 BPM | BODY MASS INDEX: 24.55 KG/M2 | HEIGHT: 62 IN | WEIGHT: 133.4 LBS | OXYGEN SATURATION: 99 %

## 2025-08-04 DIAGNOSIS — I48.21 PERMANENT ATRIAL FIBRILLATION: ICD-10-CM

## 2025-08-04 DIAGNOSIS — E03.9 ACQUIRED HYPOTHYROIDISM: ICD-10-CM

## 2025-08-04 DIAGNOSIS — E11.9 TYPE 2 DIABETES MELLITUS WITHOUT COMPLICATION, UNSPECIFIED WHETHER LONG TERM INSULIN USE: ICD-10-CM

## 2025-08-04 DIAGNOSIS — I10 ESSENTIAL HYPERTENSION: ICD-10-CM

## 2025-08-04 DIAGNOSIS — Z79.01 CHRONIC ANTICOAGULATION: ICD-10-CM

## 2025-08-04 DIAGNOSIS — R42 DIZZINESS: ICD-10-CM

## 2025-08-04 DIAGNOSIS — R42 DIZZINESS: Primary | ICD-10-CM

## 2025-08-04 LAB
ALBUMIN SERPL-MCNC: 4.5 G/DL (ref 3.5–5.2)
ALBUMIN/GLOB SERPL: 1.4 G/DL
ALP SERPL-CCNC: 98 U/L (ref 39–117)
ALT SERPL W P-5'-P-CCNC: 20 U/L (ref 1–33)
ANION GAP SERPL CALCULATED.3IONS-SCNC: 13.3 MMOL/L (ref 5–15)
AST SERPL-CCNC: 26 U/L (ref 1–32)
BACTERIA UR QL AUTO: NORMAL /HPF
BASOPHILS # BLD AUTO: 0.02 10*3/MM3 (ref 0–0.2)
BASOPHILS NFR BLD AUTO: 0.3 % (ref 0–1.5)
BILIRUB SERPL-MCNC: 1.8 MG/DL (ref 0–1.2)
BUN SERPL-MCNC: 18 MG/DL (ref 8–23)
BUN/CREAT SERPL: 22.2 (ref 7–25)
CALCIUM SPEC-SCNC: 9.7 MG/DL (ref 8.6–10.5)
CHLORIDE SERPL-SCNC: 101 MMOL/L (ref 98–107)
CO2 SERPL-SCNC: 23.7 MMOL/L (ref 22–29)
CREAT SERPL-MCNC: 0.81 MG/DL (ref 0.57–1)
DEPRECATED RDW RBC AUTO: 44.2 FL (ref 37–54)
EGFRCR SERPLBLD CKD-EPI 2021: 69.9 ML/MIN/1.73
EOSINOPHIL # BLD AUTO: 0.05 10*3/MM3 (ref 0–0.4)
EOSINOPHIL NFR BLD AUTO: 0.8 % (ref 0.3–6.2)
ERYTHROCYTE [DISTWIDTH] IN BLOOD BY AUTOMATED COUNT: 12.4 % (ref 12.3–15.4)
GLOBULIN UR ELPH-MCNC: 3.2 GM/DL
GLUCOSE SERPL-MCNC: 125 MG/DL (ref 65–99)
HBA1C MFR BLD: 6.3 % (ref 4.8–5.6)
HCT VFR BLD AUTO: 46 % (ref 34–46.6)
HGB BLD-MCNC: 15.3 G/DL (ref 12–15.9)
HYALINE CASTS UR QL AUTO: NORMAL /LPF
IMM GRANULOCYTES # BLD AUTO: 0.02 10*3/MM3 (ref 0–0.05)
IMM GRANULOCYTES NFR BLD AUTO: 0.3 % (ref 0–0.5)
LYMPHOCYTES # BLD AUTO: 2.04 10*3/MM3 (ref 0.7–3.1)
LYMPHOCYTES NFR BLD AUTO: 34 % (ref 19.6–45.3)
MCH RBC QN AUTO: 32.3 PG (ref 26.6–33)
MCHC RBC AUTO-ENTMCNC: 33.3 G/DL (ref 31.5–35.7)
MCV RBC AUTO: 97.3 FL (ref 79–97)
MONOCYTES # BLD AUTO: 0.51 10*3/MM3 (ref 0.1–0.9)
MONOCYTES NFR BLD AUTO: 8.5 % (ref 5–12)
NEUTROPHILS NFR BLD AUTO: 3.36 10*3/MM3 (ref 1.7–7)
NEUTROPHILS NFR BLD AUTO: 56.1 % (ref 42.7–76)
NRBC BLD AUTO-RTO: 0 /100 WBC (ref 0–0.2)
PLATELET # BLD AUTO: 208 10*3/MM3 (ref 140–450)
PMV BLD AUTO: 10.2 FL (ref 6–12)
POTASSIUM SERPL-SCNC: 4.5 MMOL/L (ref 3.5–5.2)
PROT SERPL-MCNC: 7.7 G/DL (ref 6–8.5)
RBC # BLD AUTO: 4.73 10*6/MM3 (ref 3.77–5.28)
RBC # UR STRIP: NORMAL /HPF
REF LAB TEST METHOD: NORMAL
SODIUM SERPL-SCNC: 138 MMOL/L (ref 136–145)
SQUAMOUS #/AREA URNS HPF: NORMAL /HPF
T3FREE SERPL-MCNC: 3.17 PG/ML (ref 2–4.4)
T4 FREE SERPL-MCNC: 1.27 NG/DL (ref 0.92–1.68)
TSH SERPL DL<=0.05 MIU/L-ACNC: 0.53 UIU/ML (ref 0.27–4.2)
WBC # UR STRIP: NORMAL /HPF
WBC NRBC COR # BLD AUTO: 6 10*3/MM3 (ref 3.4–10.8)

## 2025-08-04 PROCEDURE — 84481 FREE ASSAY (FT-3): CPT

## 2025-08-04 PROCEDURE — 1126F AMNT PAIN NOTED NONE PRSNT: CPT | Performed by: PHYSICIAN ASSISTANT

## 2025-08-04 PROCEDURE — 85025 COMPLETE CBC W/AUTO DIFF WBC: CPT

## 2025-08-04 PROCEDURE — 83036 HEMOGLOBIN GLYCOSYLATED A1C: CPT

## 2025-08-04 PROCEDURE — 1160F RVW MEDS BY RX/DR IN RCRD: CPT | Performed by: PHYSICIAN ASSISTANT

## 2025-08-04 PROCEDURE — 84443 ASSAY THYROID STIM HORMONE: CPT

## 2025-08-04 PROCEDURE — 99214 OFFICE O/P EST MOD 30 MIN: CPT | Performed by: PHYSICIAN ASSISTANT

## 2025-08-04 PROCEDURE — 84439 ASSAY OF FREE THYROXINE: CPT

## 2025-08-04 PROCEDURE — 80053 COMPREHEN METABOLIC PANEL: CPT

## 2025-08-04 PROCEDURE — G2211 COMPLEX E/M VISIT ADD ON: HCPCS | Performed by: PHYSICIAN ASSISTANT

## 2025-08-04 PROCEDURE — 81015 MICROSCOPIC EXAM OF URINE: CPT

## 2025-08-04 PROCEDURE — 1159F MED LIST DOCD IN RCRD: CPT | Performed by: PHYSICIAN ASSISTANT

## 2025-08-04 RX ORDER — MECLIZINE HYDROCHLORIDE 25 MG/1
25 TABLET ORAL 2 TIMES DAILY PRN
Qty: 30 TABLET | Refills: 0 | Status: SHIPPED | OUTPATIENT
Start: 2025-08-04 | End: 2025-08-08

## 2025-08-04 RX ORDER — FLUTICASONE PROPIONATE 50 MCG
2 SPRAY, SUSPENSION (ML) NASAL DAILY
Qty: 16 G | Refills: 1 | Status: SHIPPED | OUTPATIENT
Start: 2025-08-04

## 2025-08-07 ENCOUNTER — TELEPHONE (OUTPATIENT)
Dept: CARDIOLOGY | Facility: CLINIC | Age: 89
End: 2025-08-07
Payer: MEDICARE

## 2025-08-08 ENCOUNTER — PRE-ADMISSION TESTING (OUTPATIENT)
Dept: PREADMISSION TESTING | Facility: HOSPITAL | Age: 89
End: 2025-08-08
Payer: MEDICARE

## 2025-08-08 DIAGNOSIS — I49.5 SSS (SICK SINUS SYNDROME): ICD-10-CM

## 2025-08-08 LAB
ANION GAP SERPL CALCULATED.3IONS-SCNC: 8 MMOL/L (ref 5–15)
BUN SERPL-MCNC: 16.2 MG/DL (ref 8–23)
BUN/CREAT SERPL: 23.1 (ref 7–25)
CALCIUM SPEC-SCNC: 9.7 MG/DL (ref 8.6–10.5)
CHLORIDE SERPL-SCNC: 106 MMOL/L (ref 98–107)
CO2 SERPL-SCNC: 27 MMOL/L (ref 22–29)
CREAT SERPL-MCNC: 0.7 MG/DL (ref 0.57–1)
DEPRECATED RDW RBC AUTO: 43 FL (ref 37–54)
EGFRCR SERPLBLD CKD-EPI 2021: 83.3 ML/MIN/1.73
ERYTHROCYTE [DISTWIDTH] IN BLOOD BY AUTOMATED COUNT: 12.3 % (ref 12.3–15.4)
GLUCOSE SERPL-MCNC: 131 MG/DL (ref 65–99)
HCT VFR BLD AUTO: 41.7 % (ref 34–46.6)
HGB BLD-MCNC: 14.2 G/DL (ref 12–15.9)
INR PPP: 1.24 (ref 0.89–1.12)
MAGNESIUM SERPL-MCNC: 2.1 MG/DL (ref 1.6–2.4)
MCH RBC QN AUTO: 32.3 PG (ref 26.6–33)
MCHC RBC AUTO-ENTMCNC: 34.1 G/DL (ref 31.5–35.7)
MCV RBC AUTO: 94.8 FL (ref 79–97)
PLATELET # BLD AUTO: 176 10*3/MM3 (ref 140–450)
PMV BLD AUTO: 9.4 FL (ref 6–12)
POTASSIUM SERPL-SCNC: 4.1 MMOL/L (ref 3.5–5.2)
PROTHROMBIN TIME: 16.4 SECONDS (ref 12.2–15.3)
RBC # BLD AUTO: 4.4 10*6/MM3 (ref 3.77–5.28)
SODIUM SERPL-SCNC: 141 MMOL/L (ref 136–145)
WBC NRBC COR # BLD AUTO: 4.83 10*3/MM3 (ref 3.4–10.8)

## 2025-08-08 PROCEDURE — 85610 PROTHROMBIN TIME: CPT

## 2025-08-08 PROCEDURE — 85027 COMPLETE CBC AUTOMATED: CPT

## 2025-08-08 PROCEDURE — 83735 ASSAY OF MAGNESIUM: CPT

## 2025-08-08 PROCEDURE — 36415 COLL VENOUS BLD VENIPUNCTURE: CPT

## 2025-08-08 PROCEDURE — 80048 BASIC METABOLIC PNL TOTAL CA: CPT

## 2025-08-13 ENCOUNTER — RESULTS FOLLOW-UP (OUTPATIENT)
Dept: LAB | Facility: HOSPITAL | Age: 89
End: 2025-08-13
Payer: MEDICARE

## 2025-08-13 ENCOUNTER — HOSPITAL ENCOUNTER (OUTPATIENT)
Facility: HOSPITAL | Age: 89
Discharge: HOME | End: 2025-08-13
Attending: INTERNAL MEDICINE | Admitting: INTERNAL MEDICINE
Payer: MEDICARE

## 2025-08-13 VITALS
HEIGHT: 62 IN | WEIGHT: 135 LBS | DIASTOLIC BLOOD PRESSURE: 59 MMHG | OXYGEN SATURATION: 95 % | BODY MASS INDEX: 24.84 KG/M2 | TEMPERATURE: 97.3 F | SYSTOLIC BLOOD PRESSURE: 112 MMHG | HEART RATE: 60 BPM | RESPIRATION RATE: 16 BRPM

## 2025-08-13 DIAGNOSIS — Z45.018 ELECTIVE REPLACEMENT INDICATED FOR PACEMAKER: ICD-10-CM

## 2025-08-13 DIAGNOSIS — I49.5 SSS (SICK SINUS SYNDROME): ICD-10-CM

## 2025-08-13 PROCEDURE — 33228 REMV&REPLC PM GEN DUAL LEAD: CPT | Performed by: INTERNAL MEDICINE

## 2025-08-13 PROCEDURE — 25010000002 BUPIVACAINE 0.5 % SOLUTION: Performed by: INTERNAL MEDICINE

## 2025-08-13 PROCEDURE — 25010000002 FENTANYL CITRATE (PF) 50 MCG/ML SOLUTION: Performed by: INTERNAL MEDICINE

## 2025-08-13 PROCEDURE — 25010000002 MIDAZOLAM PER 1 MG: Performed by: INTERNAL MEDICINE

## 2025-08-13 PROCEDURE — 25810000003 SODIUM CHLORIDE 0.9 % SOLUTION: Performed by: INTERNAL MEDICINE

## 2025-08-13 PROCEDURE — 99152 MOD SED SAME PHYS/QHP 5/>YRS: CPT | Performed by: INTERNAL MEDICINE

## 2025-08-13 PROCEDURE — C1785 PMKR, DUAL, RATE-RESP: HCPCS | Performed by: INTERNAL MEDICINE

## 2025-08-13 PROCEDURE — 25010000002 LIDOCAINE PF 1% 1 % SOLUTION: Performed by: INTERNAL MEDICINE

## 2025-08-13 PROCEDURE — 25010000002 CEFAZOLIN PER 500 MG: Performed by: NURSE PRACTITIONER

## 2025-08-13 PROCEDURE — 25010000002 ONDANSETRON PER 1 MG: Performed by: INTERNAL MEDICINE

## 2025-08-13 DEVICE — GEN PM ASSURITY MRI DR RF PM2272: Type: IMPLANTABLE DEVICE | Status: FUNCTIONAL

## 2025-08-13 RX ORDER — SODIUM CHLORIDE 9 MG/ML
40 INJECTION, SOLUTION INTRAVENOUS AS NEEDED
Status: DISCONTINUED | OUTPATIENT
Start: 2025-08-13 | End: 2025-08-13 | Stop reason: HOSPADM

## 2025-08-13 RX ORDER — SODIUM CHLORIDE 0.9 % (FLUSH) 0.9 %
3 SYRINGE (ML) INJECTION EVERY 12 HOURS SCHEDULED
Status: DISCONTINUED | OUTPATIENT
Start: 2025-08-13 | End: 2025-08-13 | Stop reason: HOSPADM

## 2025-08-13 RX ORDER — SODIUM CHLORIDE 9 MG/ML
INJECTION, SOLUTION INTRAVENOUS
Status: COMPLETED | OUTPATIENT
Start: 2025-08-13 | End: 2025-08-13

## 2025-08-13 RX ORDER — ONDANSETRON 2 MG/ML
INJECTION INTRAMUSCULAR; INTRAVENOUS
Status: DISCONTINUED | OUTPATIENT
Start: 2025-08-13 | End: 2025-08-13 | Stop reason: HOSPADM

## 2025-08-13 RX ORDER — LIDOCAINE HYDROCHLORIDE 10 MG/ML
INJECTION, SOLUTION EPIDURAL; INFILTRATION; INTRACAUDAL; PERINEURAL
Status: DISCONTINUED | OUTPATIENT
Start: 2025-08-13 | End: 2025-08-13 | Stop reason: HOSPADM

## 2025-08-13 RX ORDER — SODIUM CHLORIDE 0.9 % (FLUSH) 0.9 %
10 SYRINGE (ML) INJECTION AS NEEDED
Status: DISCONTINUED | OUTPATIENT
Start: 2025-08-13 | End: 2025-08-13 | Stop reason: HOSPADM

## 2025-08-13 RX ORDER — MIDAZOLAM HYDROCHLORIDE 1 MG/ML
INJECTION, SOLUTION INTRAMUSCULAR; INTRAVENOUS
Status: DISCONTINUED | OUTPATIENT
Start: 2025-08-13 | End: 2025-08-13 | Stop reason: HOSPADM

## 2025-08-13 RX ORDER — BUPIVACAINE HYDROCHLORIDE 5 MG/ML
INJECTION, SOLUTION PERINEURAL
Status: DISCONTINUED | OUTPATIENT
Start: 2025-08-13 | End: 2025-08-13 | Stop reason: HOSPADM

## 2025-08-13 RX ORDER — NITROGLYCERIN 0.4 MG/1
0.4 TABLET SUBLINGUAL
Status: DISCONTINUED | OUTPATIENT
Start: 2025-08-13 | End: 2025-08-13 | Stop reason: HOSPADM

## 2025-08-13 RX ORDER — ONDANSETRON 2 MG/ML
4 INJECTION INTRAMUSCULAR; INTRAVENOUS EVERY 6 HOURS PRN
Status: DISCONTINUED | OUTPATIENT
Start: 2025-08-13 | End: 2025-08-13 | Stop reason: HOSPADM

## 2025-08-13 RX ORDER — FENTANYL CITRATE 50 UG/ML
INJECTION, SOLUTION INTRAMUSCULAR; INTRAVENOUS
Status: DISCONTINUED | OUTPATIENT
Start: 2025-08-13 | End: 2025-08-13 | Stop reason: HOSPADM

## 2025-08-13 RX ORDER — ACETAMINOPHEN 325 MG/1
650 TABLET ORAL EVERY 4 HOURS PRN
Status: DISCONTINUED | OUTPATIENT
Start: 2025-08-13 | End: 2025-08-13 | Stop reason: HOSPADM

## 2025-08-13 RX ADMIN — SODIUM CHLORIDE 1 G: 9 INJECTION, SOLUTION INTRAVENOUS at 14:22

## 2025-08-20 ENCOUNTER — OFFICE VISIT (OUTPATIENT)
Dept: CARDIOLOGY | Facility: CLINIC | Age: 89
End: 2025-08-20
Payer: MEDICARE

## 2025-08-20 DIAGNOSIS — Z45.018 ELECTIVE REPLACEMENT INDICATED FOR PACEMAKER: Primary | ICD-10-CM

## 2025-08-21 DIAGNOSIS — R42 DIZZINESS: Primary | ICD-10-CM

## 2025-08-22 ENCOUNTER — TELEPHONE (OUTPATIENT)
Dept: INTERNAL MEDICINE | Facility: CLINIC | Age: 89
End: 2025-08-22
Payer: MEDICARE

## 2025-08-22 ENCOUNTER — OFFICE VISIT (OUTPATIENT)
Dept: INTERNAL MEDICINE | Facility: CLINIC | Age: 89
End: 2025-08-22
Payer: MEDICARE

## 2025-08-22 VITALS
HEART RATE: 60 BPM | OXYGEN SATURATION: 99 % | TEMPERATURE: 97.7 F | WEIGHT: 135 LBS | BODY MASS INDEX: 24.69 KG/M2 | DIASTOLIC BLOOD PRESSURE: 80 MMHG | SYSTOLIC BLOOD PRESSURE: 110 MMHG

## 2025-08-22 DIAGNOSIS — R41.3 MEMORY IMPAIRMENT: ICD-10-CM

## 2025-08-22 DIAGNOSIS — R53.83 OTHER FATIGUE: ICD-10-CM

## 2025-08-22 DIAGNOSIS — R42 DIZZINESS: Primary | ICD-10-CM

## 2025-08-22 DIAGNOSIS — Z95.0 CARDIAC PACEMAKER IN SITU: ICD-10-CM

## 2025-08-25 ENCOUNTER — TELEPHONE (OUTPATIENT)
Dept: INTERNAL MEDICINE | Facility: CLINIC | Age: 89
End: 2025-08-25
Payer: MEDICARE

## 2025-08-26 ENCOUNTER — OFFICE VISIT (OUTPATIENT)
Dept: CARDIOLOGY | Facility: CLINIC | Age: 89
End: 2025-08-26
Payer: MEDICARE

## 2025-08-26 ENCOUNTER — PATIENT ROUNDING (BHMG ONLY) (OUTPATIENT)
Dept: CARDIOLOGY | Facility: CLINIC | Age: 89
End: 2025-08-26
Payer: MEDICARE

## 2025-08-26 VITALS
HEIGHT: 62 IN | DIASTOLIC BLOOD PRESSURE: 78 MMHG | SYSTOLIC BLOOD PRESSURE: 150 MMHG | HEART RATE: 60 BPM | WEIGHT: 134 LBS | OXYGEN SATURATION: 98 % | BODY MASS INDEX: 24.66 KG/M2

## 2025-08-26 DIAGNOSIS — I44.2 COMPLETE HEART BLOCK: ICD-10-CM

## 2025-08-26 DIAGNOSIS — I10 ESSENTIAL HYPERTENSION: ICD-10-CM

## 2025-08-26 DIAGNOSIS — I48.21 PERMANENT ATRIAL FIBRILLATION: ICD-10-CM

## 2025-08-26 DIAGNOSIS — Z95.0 CARDIAC PACEMAKER IN SITU: ICD-10-CM

## 2025-08-26 DIAGNOSIS — R06.02 SOB (SHORTNESS OF BREATH): Primary | ICD-10-CM

## 2025-08-27 ENCOUNTER — TREATMENT (OUTPATIENT)
Dept: PHYSICAL THERAPY | Facility: CLINIC | Age: 89
End: 2025-08-27
Payer: MEDICARE

## 2025-08-27 DIAGNOSIS — R26.2 DIFFICULTY WALKING: ICD-10-CM

## 2025-08-27 DIAGNOSIS — H83.2X3 VESTIBULAR HYPOFUNCTION OF BOTH EARS: ICD-10-CM

## 2025-08-27 DIAGNOSIS — H81.13 BENIGN PAROXYSMAL POSITIONAL VERTIGO DUE TO BILATERAL VESTIBULAR DISORDER: Primary | ICD-10-CM

## 2025-08-27 DIAGNOSIS — R42 DIZZINESS: ICD-10-CM

## 2025-08-29 DIAGNOSIS — H92.02 LEFT EAR PAIN: Primary | ICD-10-CM

## 2025-08-29 RX ORDER — NEOMYCIN SULFATE, POLYMYXIN B SULFATE, HYDROCORTISONE 3.5; 10000; 1 MG/ML; [USP'U]/ML; MG/ML
3 SOLUTION/ DROPS AURICULAR (OTIC) 4 TIMES DAILY
Qty: 10 ML | Refills: 0 | Status: SHIPPED | OUTPATIENT
Start: 2025-08-29

## (undated) DEVICE — YANKAUER,BULB TIP,W/O VENT,RIGID,STERILE: Brand: MEDLINE

## (undated) DEVICE — CAUTERY TIP POLISHER: Brand: DEVON

## (undated) DEVICE — TUBING, SUCTION, 1/4" X 10', STRAIGHT: Brand: MEDLINE

## (undated) DEVICE — ST INF PRI SMRTSTE 20DRP 2VLV 24ML 117

## (undated) DEVICE — LEX ELECTRO PHYSIOLOGY: Brand: MEDLINE INDUSTRIES, INC.

## (undated) DEVICE — ADULT, W/LG. BACK PAD, RADIOTRANSPARENT ELEMENT AND LEAD WIRE COMPATIBLE W/: Brand: DEFIBRILLATION ELECTRODES

## (undated) DEVICE — DRSNG SURESITE123 4X4.8IN

## (undated) DEVICE — LIMB HOLDER, WRIST/ANKLE: Brand: DEROYAL

## (undated) DEVICE — DECANT BG O JET

## (undated) DEVICE — SOL NACL 0.9PCT 1000ML

## (undated) DEVICE — ST EXT IV SMARTSITE 2VLV SP M LL 5ML IV1

## (undated) DEVICE — ADULT, W/LG. BACK PAD, RADIOTRANSPARENT ELEMENT AND LEAD WIRE: Brand: DEFIBRILLATION ELECTRODES

## (undated) DEVICE — PLASMABLADE PS210-030S 3.0S LOCK: Brand: PLASMABLADETM

## (undated) DEVICE — ELECTRD RETRN/GRND MEGADYNE SGL/PLT W/CORD 9FT DISP

## (undated) DEVICE — PAD,NON-ADHERENT,3X8,STERILE,LF,1/PK: Brand: CURAD

## (undated) DEVICE — SET PRIMARY GRVTY 10DP MALE LL 104IN

## (undated) DEVICE — ABLATION CATHETER: Brand: INTELLANAV MIFI™ OPEN-IRRIGATED

## (undated) DEVICE — ST EXT IV SMRTSTE 2VLV FIX M LL 6ML 41

## (undated) DEVICE — CANN NASL CO2 DIVIDED A/

## (undated) DEVICE — INTRO SHEATH TRNSEP .032 SL0 8.5F 63CM

## (undated) DEVICE — ABLATION CATHETER: Brand: INTELLATIP MIFI™ OPEN-IRRIGATED

## (undated) DEVICE — CANN NASL CAPNOFLEX SMPL CO2/O2 W/O2/DEL A/